# Patient Record
Sex: FEMALE | Race: WHITE | NOT HISPANIC OR LATINO | Employment: FULL TIME | URBAN - METROPOLITAN AREA
[De-identification: names, ages, dates, MRNs, and addresses within clinical notes are randomized per-mention and may not be internally consistent; named-entity substitution may affect disease eponyms.]

---

## 2017-11-17 ENCOUNTER — TRANSFERRED RECORDS (OUTPATIENT)
Dept: HEALTH INFORMATION MANAGEMENT | Facility: CLINIC | Age: 37
End: 2017-11-17

## 2020-04-18 ENCOUNTER — TRANSFERRED RECORDS (OUTPATIENT)
Dept: HEALTH INFORMATION MANAGEMENT | Facility: CLINIC | Age: 40
End: 2020-04-18

## 2020-10-07 ENCOUNTER — TRANSFERRED RECORDS (OUTPATIENT)
Dept: HEALTH INFORMATION MANAGEMENT | Facility: CLINIC | Age: 40
End: 2020-10-07

## 2023-02-10 ENCOUNTER — TRANSFERRED RECORDS (OUTPATIENT)
Dept: HEALTH INFORMATION MANAGEMENT | Facility: CLINIC | Age: 43
End: 2023-02-10

## 2023-02-12 ENCOUNTER — HEALTH MAINTENANCE LETTER (OUTPATIENT)
Age: 43
End: 2023-02-12

## 2023-03-15 ENCOUNTER — OFFICE VISIT (OUTPATIENT)
Dept: URGENT CARE | Facility: URGENT CARE | Age: 43
End: 2023-03-15
Payer: COMMERCIAL

## 2023-03-15 ENCOUNTER — ANCILLARY PROCEDURE (OUTPATIENT)
Dept: GENERAL RADIOLOGY | Facility: CLINIC | Age: 43
End: 2023-03-15
Attending: NURSE PRACTITIONER
Payer: COMMERCIAL

## 2023-03-15 VITALS
TEMPERATURE: 98.8 F | DIASTOLIC BLOOD PRESSURE: 82 MMHG | SYSTOLIC BLOOD PRESSURE: 128 MMHG | HEART RATE: 89 BPM | OXYGEN SATURATION: 98 %

## 2023-03-15 DIAGNOSIS — R05.1 ACUTE COUGH: ICD-10-CM

## 2023-03-15 DIAGNOSIS — R06.2 WHEEZE: ICD-10-CM

## 2023-03-15 DIAGNOSIS — J40 SINOBRONCHITIS: ICD-10-CM

## 2023-03-15 DIAGNOSIS — J32.9 SINOBRONCHITIS: ICD-10-CM

## 2023-03-15 DIAGNOSIS — R05.1 ACUTE COUGH: Primary | ICD-10-CM

## 2023-03-15 PROBLEM — S02.401A: Status: ACTIVE | Noted: 2020-04-22

## 2023-03-15 PROBLEM — S02.2XXA CLOSED FRACTURE OF NASAL BONES: Status: ACTIVE | Noted: 2020-04-22

## 2023-03-15 PROBLEM — M20.001 FINGER DEFORMITY, ACQUIRED, RIGHT: Status: ACTIVE | Noted: 2020-04-22

## 2023-03-15 PROBLEM — F14.10 DRUG ABUSE, COCAINE TYPE (H): Status: ACTIVE | Noted: 2020-04-22

## 2023-03-15 PROBLEM — L65.9 HAIR LOSS: Status: ACTIVE | Noted: 2017-08-14

## 2023-03-15 PROBLEM — R00.9 ABNORMALITY OF HEART BEAT: Status: ACTIVE | Noted: 2019-03-20

## 2023-03-15 PROBLEM — K20.90 ESOPHAGITIS DETERMINED BY ENDOSCOPY: Status: ACTIVE | Noted: 2020-08-28

## 2023-03-15 PROBLEM — K44.9 HIATAL HERNIA: Status: ACTIVE | Noted: 2020-04-22

## 2023-03-15 PROCEDURE — 71046 X-RAY EXAM CHEST 2 VIEWS: CPT | Mod: TC | Performed by: RADIOLOGY

## 2023-03-15 PROCEDURE — 99204 OFFICE O/P NEW MOD 45 MIN: CPT | Performed by: NURSE PRACTITIONER

## 2023-03-15 RX ORDER — ALBUTEROL SULFATE 90 UG/1
2 AEROSOL, METERED RESPIRATORY (INHALATION) EVERY 6 HOURS PRN
Qty: 18 G | Refills: 0 | Status: SHIPPED | OUTPATIENT
Start: 2023-03-15 | End: 2023-03-15

## 2023-03-15 RX ORDER — DOXYCYCLINE 100 MG/1
100 CAPSULE ORAL 2 TIMES DAILY
Qty: 14 CAPSULE | Refills: 0 | Status: SHIPPED | OUTPATIENT
Start: 2023-03-15 | End: 2023-03-22

## 2023-03-15 RX ORDER — DOXYCYCLINE 100 MG/1
100 CAPSULE ORAL 2 TIMES DAILY
Qty: 14 CAPSULE | Refills: 0 | Status: SHIPPED | OUTPATIENT
Start: 2023-03-15 | End: 2023-03-15

## 2023-03-15 RX ORDER — ALBUTEROL SULFATE 90 UG/1
2 AEROSOL, METERED RESPIRATORY (INHALATION) EVERY 6 HOURS PRN
Qty: 18 G | Refills: 0 | Status: SHIPPED | OUTPATIENT
Start: 2023-03-15 | End: 2023-03-27

## 2023-03-15 RX ORDER — OMEPRAZOLE 40 MG/1
40 CAPSULE, DELAYED RELEASE ORAL
COMMUNITY
Start: 2021-06-09 | End: 2024-07-11

## 2023-03-15 RX ORDER — LORAZEPAM 0.5 MG/1
0.5 TABLET ORAL
COMMUNITY
Start: 2021-06-09 | End: 2023-06-14

## 2023-03-15 RX ORDER — SERTRALINE HYDROCHLORIDE 100 MG/1
1 TABLET, FILM COATED ORAL DAILY
COMMUNITY
Start: 2021-06-09 | End: 2023-03-27

## 2023-03-15 NOTE — PATIENT INSTRUCTIONS
Doxy for sinobronchitis  Albuterol for wheeze  Chest x-ray is unremarkable  Rest! Your body needs more rest to heal.  Drink plenty of fluids (warm fluids like tea or soup are soothing and reduce cough)  Sit in the bathroom with a hot shower running and breathe in the steam.  Honey may soothe your sore throat and help manage your cough- may take straight or in warm water with lemon juice.  Avoid smoke (cigarettes, bonfires, fireplace, wood burning stoves).  Take Tylenol or an NSAID such as ibuprofen or naproxen as needed for pain.  Delsym (dextromethorphan polistirex) is an over the counter cough medication that lasts 12 hours.   Mucinex or Robitussin (guiafenesin) thin mucus and may help it to loosen more quickly  Good handwashing is the best way to prevent spread of germs  Present to emergency room if you develop trouble breathing, swallowing or cough-up blood, chest pain.  Follow up with your primary care provider if symptoms worsen or fail to improve as expected.

## 2023-03-15 NOTE — PROGRESS NOTES
Chief Complaint   Patient presents with     Urgent Care     Cough     Having trouble breathing, coughing as well.      SUBJECTIVE:  Huy Coles is a 42 year old female presenting with cough for over a month dry wheezy shortness of breath with sinus pain pressure headaches congestion.  Declines chest pain hemoptysis DVT history calf pain redness swelling recent flights or surgeries.  Took 2 negative COVID test.  No asthma or smoking.  Tried OTCs without response.    No past medical history on file.  LORazepam (ATIVAN) 0.5 MG tablet, Take 0.5 mg by mouth  omeprazole (PRILOSEC) 40 MG DR capsule, Take 40 mg by mouth  sertraline (ZOLOFT) 100 MG tablet, Take 1 tablet by mouth daily    No current facility-administered medications on file prior to visit.    Social History     Tobacco Use     Smoking status: Not on file     Smokeless tobacco: Not on file   Substance Use Topics     Alcohol use: Not on file     Allergies   Allergen Reactions     Azithromycin Hives     Codeine Other (See Comments)     Drowsiness, severe     Tramadol Other (See Comments)     Confusion     Cefaclor Rash     Sulfa Drugs Hives and Rash       Review of Systems   All systems negative except for those listed above in HPI.    OBJECTIVE:   /82   Pulse 89   Temp 98.8  F (37.1  C) (Temporal)   SpO2 98%      Physical Exam  Vitals reviewed.   Constitutional:       Appearance: Normal appearance. She is ill-appearing.   HENT:      Head: Normocephalic and atraumatic.      Right Ear: Tympanic membrane and ear canal normal.      Left Ear: Tympanic membrane and ear canal normal.      Nose: Congestion and rhinorrhea present.   Cardiovascular:      Rate and Rhythm: Normal rate.      Pulses: Normal pulses.   Pulmonary:      Effort: Respiratory distress present.      Breath sounds: No stridor. Wheezing and rhonchi present. No rales.   Chest:      Chest wall: No tenderness.   Musculoskeletal:         General: Normal range of motion.   Skin:     General:  Skin is warm and dry.      Findings: No rash.   Neurological:      General: No focal deficit present.      Mental Status: She is alert and oriented to person, place, and time.   Psychiatric:         Mood and Affect: Mood normal.         Behavior: Behavior normal.       X-ray done in clinic read by me as negative for cardiopulmonary abnormality.    ASSESSMENT:    ICD-10-CM    1. Acute cough  R05.1 XR Chest 2 Views      2. Wheeze  R06.2 albuterol (PROAIR HFA/PROVENTIL HFA/VENTOLIN HFA) 108 (90 Base) MCG/ACT inhaler     DISCONTINUED: albuterol (PROAIR HFA/PROVENTIL HFA/VENTOLIN HFA) 108 (90 Base) MCG/ACT inhaler      3. Sinobronchitis  J32.9 doxycycline hyclate (VIBRAMYCIN) 100 MG capsule    J40 DISCONTINUED: doxycycline hyclate (VIBRAMYCIN) 100 MG capsule        PLAN:    Doxy for sinobronchitis  Albuterol for wheeze  Chest x-ray is unremarkable  Rest! Your body needs more rest to heal.  Drink plenty of fluids (warm fluids like tea or soup are soothing and reduce cough)  Sit in the bathroom with a hot shower running and breathe in the steam.  Honey may soothe your sore throat and help manage your cough- may take straight or in warm water with lemon juice.  Avoid smoke (cigarettes, bonfires, fireplace, wood burning stoves).  Take Tylenol or an NSAID such as ibuprofen or naproxen as needed for pain.  Delsym (dextromethorphan polistirex) is an over the counter cough medication that lasts 12 hours.   Mucinex or Robitussin (guiafenesin) thin mucus and may help it to loosen more quickly  Good handwashing is the best way to prevent spread of germs  Present to emergency room if you develop trouble breathing, swallowing or cough-up blood, chest pain.  Follow up with your primary care provider if symptoms worsen or fail to improve as expected.    Follow up with primary care provider with any problems, questions or concerns or if symptoms worsen or fail to improve. Patient agreed to plan and verbalized understanding.    Lay  CHRYSTAL Price-BC  [facility]

## 2023-03-27 ENCOUNTER — OFFICE VISIT (OUTPATIENT)
Dept: FAMILY MEDICINE | Facility: CLINIC | Age: 43
End: 2023-03-27
Payer: COMMERCIAL

## 2023-03-27 VITALS
WEIGHT: 217.3 LBS | TEMPERATURE: 97.7 F | SYSTOLIC BLOOD PRESSURE: 120 MMHG | HEART RATE: 82 BPM | BODY MASS INDEX: 34.92 KG/M2 | DIASTOLIC BLOOD PRESSURE: 80 MMHG | HEIGHT: 66 IN | RESPIRATION RATE: 20 BRPM | OXYGEN SATURATION: 100 %

## 2023-03-27 DIAGNOSIS — E55.9 VITAMIN D DEFICIENCY: ICD-10-CM

## 2023-03-27 DIAGNOSIS — M20.001 FINGER DEFORMITY, ACQUIRED, RIGHT: ICD-10-CM

## 2023-03-27 DIAGNOSIS — Z13.220 SCREENING FOR HYPERLIPIDEMIA: ICD-10-CM

## 2023-03-27 DIAGNOSIS — K44.9 HIATAL HERNIA: ICD-10-CM

## 2023-03-27 DIAGNOSIS — Z13.1 ENCOUNTER FOR SCREENING FOR DIABETES MELLITUS: ICD-10-CM

## 2023-03-27 DIAGNOSIS — R53.83 FATIGUE, UNSPECIFIED TYPE: ICD-10-CM

## 2023-03-27 DIAGNOSIS — R05.2 SUBACUTE COUGH: ICD-10-CM

## 2023-03-27 DIAGNOSIS — R06.2 WHEEZE: ICD-10-CM

## 2023-03-27 DIAGNOSIS — F41.1 GENERALIZED ANXIETY DISORDER: ICD-10-CM

## 2023-03-27 DIAGNOSIS — F33.0 MILD EPISODE OF RECURRENT MAJOR DEPRESSIVE DISORDER (H): ICD-10-CM

## 2023-03-27 DIAGNOSIS — D50.9 IRON DEFICIENCY ANEMIA, UNSPECIFIED IRON DEFICIENCY ANEMIA TYPE: ICD-10-CM

## 2023-03-27 DIAGNOSIS — Z12.31 ENCOUNTER FOR SCREENING MAMMOGRAM FOR BREAST CANCER: ICD-10-CM

## 2023-03-27 DIAGNOSIS — Z76.89 ENCOUNTER TO ESTABLISH CARE WITH NEW DOCTOR: Primary | ICD-10-CM

## 2023-03-27 DIAGNOSIS — K20.90 ESOPHAGITIS DETERMINED BY ENDOSCOPY: ICD-10-CM

## 2023-03-27 LAB
ALBUMIN SERPL BCG-MCNC: 4.3 G/DL (ref 3.5–5.2)
ALP SERPL-CCNC: 115 U/L (ref 35–104)
ALT SERPL W P-5'-P-CCNC: 10 U/L (ref 10–35)
ANION GAP SERPL CALCULATED.3IONS-SCNC: 13 MMOL/L (ref 7–15)
AST SERPL W P-5'-P-CCNC: 22 U/L (ref 10–35)
BASOPHILS # BLD AUTO: 0 10E3/UL (ref 0–0.2)
BASOPHILS NFR BLD AUTO: 0 %
BILIRUB SERPL-MCNC: 0.4 MG/DL
BUN SERPL-MCNC: 8.8 MG/DL (ref 6–20)
CALCIUM SERPL-MCNC: 9.2 MG/DL (ref 8.6–10)
CHLORIDE SERPL-SCNC: 104 MMOL/L (ref 98–107)
CHOLEST SERPL-MCNC: 204 MG/DL
CREAT SERPL-MCNC: 0.83 MG/DL (ref 0.51–0.95)
DEPRECATED HCO3 PLAS-SCNC: 23 MMOL/L (ref 22–29)
EOSINOPHIL # BLD AUTO: 0.1 10E3/UL (ref 0–0.7)
EOSINOPHIL NFR BLD AUTO: 1 %
ERYTHROCYTE [DISTWIDTH] IN BLOOD BY AUTOMATED COUNT: 17 % (ref 10–15)
FERRITIN SERPL-MCNC: 6 NG/ML (ref 6–175)
GFR SERPL CREATININE-BSD FRML MDRD: 90 ML/MIN/1.73M2
GLUCOSE SERPL-MCNC: 86 MG/DL (ref 70–99)
HBA1C MFR BLD: 5.8 % (ref 0–5.6)
HCT VFR BLD AUTO: 29 % (ref 35–47)
HDLC SERPL-MCNC: 44 MG/DL
HGB BLD-MCNC: 8.1 G/DL (ref 11.7–15.7)
IMM GRANULOCYTES # BLD: 0 10E3/UL
IMM GRANULOCYTES NFR BLD: 0 %
IRON BINDING CAPACITY (ROCHE): 432 UG/DL (ref 240–430)
IRON SATN MFR SERPL: 3 % (ref 15–46)
IRON SERPL-MCNC: 15 UG/DL (ref 37–145)
LDLC SERPL CALC-MCNC: 118 MG/DL
LYMPHOCYTES # BLD AUTO: 3.2 10E3/UL (ref 0.8–5.3)
LYMPHOCYTES NFR BLD AUTO: 31 %
MCH RBC QN AUTO: 19.3 PG (ref 26.5–33)
MCHC RBC AUTO-ENTMCNC: 27.9 G/DL (ref 31.5–36.5)
MCV RBC AUTO: 69 FL (ref 78–100)
MONOCYTES # BLD AUTO: 0.6 10E3/UL (ref 0–1.3)
MONOCYTES NFR BLD AUTO: 5 %
NEUTROPHILS # BLD AUTO: 6.5 10E3/UL (ref 1.6–8.3)
NEUTROPHILS NFR BLD AUTO: 63 %
NONHDLC SERPL-MCNC: 160 MG/DL
PLATELET # BLD AUTO: 554 10E3/UL (ref 150–450)
POTASSIUM SERPL-SCNC: 3.8 MMOL/L (ref 3.4–5.3)
PROT SERPL-MCNC: 7.9 G/DL (ref 6.4–8.3)
RBC # BLD AUTO: 4.2 10E6/UL (ref 3.8–5.2)
SODIUM SERPL-SCNC: 140 MMOL/L (ref 136–145)
TRIGL SERPL-MCNC: 209 MG/DL
TSH SERPL DL<=0.005 MIU/L-ACNC: 1.53 UIU/ML (ref 0.3–4.2)
WBC # BLD AUTO: 10.4 10E3/UL (ref 4–11)

## 2023-03-27 PROCEDURE — 83540 ASSAY OF IRON: CPT | Performed by: NURSE PRACTITIONER

## 2023-03-27 PROCEDURE — 36415 COLL VENOUS BLD VENIPUNCTURE: CPT | Performed by: NURSE PRACTITIONER

## 2023-03-27 PROCEDURE — 80050 GENERAL HEALTH PANEL: CPT | Performed by: NURSE PRACTITIONER

## 2023-03-27 PROCEDURE — 82728 ASSAY OF FERRITIN: CPT | Performed by: NURSE PRACTITIONER

## 2023-03-27 PROCEDURE — 83036 HEMOGLOBIN GLYCOSYLATED A1C: CPT | Performed by: NURSE PRACTITIONER

## 2023-03-27 PROCEDURE — 80061 LIPID PANEL: CPT | Performed by: NURSE PRACTITIONER

## 2023-03-27 PROCEDURE — 82306 VITAMIN D 25 HYDROXY: CPT | Performed by: NURSE PRACTITIONER

## 2023-03-27 PROCEDURE — 83550 IRON BINDING TEST: CPT | Performed by: NURSE PRACTITIONER

## 2023-03-27 PROCEDURE — 96127 BRIEF EMOTIONAL/BEHAV ASSMT: CPT | Performed by: NURSE PRACTITIONER

## 2023-03-27 PROCEDURE — 99215 OFFICE O/P EST HI 40 MIN: CPT | Performed by: NURSE PRACTITIONER

## 2023-03-27 RX ORDER — SERTRALINE HYDROCHLORIDE 100 MG/1
100 TABLET, FILM COATED ORAL DAILY
Qty: 90 TABLET | Refills: 1 | Status: SHIPPED | OUTPATIENT
Start: 2023-03-27 | End: 2024-07-11

## 2023-03-27 RX ORDER — BUSPIRONE HYDROCHLORIDE 5 MG/1
5-10 TABLET ORAL 3 TIMES DAILY
Qty: 180 TABLET | Refills: 1 | Status: SHIPPED | OUTPATIENT
Start: 2023-03-27 | End: 2024-07-11

## 2023-03-27 RX ORDER — FLUTICASONE PROPIONATE 50 MCG
1 SPRAY, SUSPENSION (ML) NASAL DAILY
Qty: 18 ML | Refills: 0 | Status: SHIPPED | OUTPATIENT
Start: 2023-03-27 | End: 2024-07-11

## 2023-03-27 RX ORDER — BUSPIRONE HYDROCHLORIDE 5 MG/1
5-10 TABLET ORAL 3 TIMES DAILY
Qty: 180 TABLET | Refills: 1 | Status: SHIPPED | OUTPATIENT
Start: 2023-03-27 | End: 2023-03-27

## 2023-03-27 RX ORDER — ALBUTEROL SULFATE 90 UG/1
2 AEROSOL, METERED RESPIRATORY (INHALATION) EVERY 6 HOURS PRN
Qty: 18 G | Refills: 0 | Status: ON HOLD | OUTPATIENT
Start: 2023-03-27 | End: 2023-06-20

## 2023-03-27 ASSESSMENT — ANXIETY QUESTIONNAIRES
IF YOU CHECKED OFF ANY PROBLEMS ON THIS QUESTIONNAIRE, HOW DIFFICULT HAVE THESE PROBLEMS MADE IT FOR YOU TO DO YOUR WORK, TAKE CARE OF THINGS AT HOME, OR GET ALONG WITH OTHER PEOPLE: SOMEWHAT DIFFICULT
3. WORRYING TOO MUCH ABOUT DIFFERENT THINGS: SEVERAL DAYS
1. FEELING NERVOUS, ANXIOUS, OR ON EDGE: SEVERAL DAYS
6. BECOMING EASILY ANNOYED OR IRRITABLE: MORE THAN HALF THE DAYS
5. BEING SO RESTLESS THAT IT IS HARD TO SIT STILL: NOT AT ALL
2. NOT BEING ABLE TO STOP OR CONTROL WORRYING: SEVERAL DAYS
7. FEELING AFRAID AS IF SOMETHING AWFUL MIGHT HAPPEN: NOT AT ALL
GAD7 TOTAL SCORE: 6
GAD7 TOTAL SCORE: 6

## 2023-03-27 ASSESSMENT — PAIN SCALES - GENERAL: PAINLEVEL: NO PAIN (0)

## 2023-03-27 ASSESSMENT — PATIENT HEALTH QUESTIONNAIRE - PHQ9
5. POOR APPETITE OR OVEREATING: SEVERAL DAYS
SUM OF ALL RESPONSES TO PHQ QUESTIONS 1-9: 4

## 2023-03-27 NOTE — PROGRESS NOTES
Assessment & Plan     Encounter to establish care with new doctor  Reviewed chronic health conditions; medications, labs and pertinent health concerns today    Wheeze  Subacute cough  - fluticasone (FLONASE) 50 MCG/ACT nasal spray  Dispense: 18 mL; Refill: 0  Renewed inhaler  - albuterol (PROAIR HFA/PROVENTIL HFA/VENTOLIN HFA) 108 (90 Base) MCG/ACT inhaler  Dispense: 18 g; Refill: 0    Encounter for screening for diabetes mellitus  - Hemoglobin A1c  - Hemoglobin A1c    Screening for hyperlipidemia  - Lipid Profile  - Lipid Profile    Generalized anxiety disorder  Mild episode of recurrent major depressive disorder (H)  Stable; continue current regimen; renewed medication  - sertraline (ZOLOFT) 100 MG tablet  Dispense: 90 tablet; Refill: 1  - busPIRone (BUSPAR) 5 MG tablet  Dispense: 180 tablet; Refill: 1      Fatigue, unspecified type  Check iron studies d/t uncontrolled gerd, tsh,, vitamin d  - CBC with Platelets & Differential  - Comprehensive metabolic panel  - Ferritin  - Iron & Iron Binding Capacity  - TSH with free T4 reflex  - CBC with Platelets & Differential  - Comprehensive metabolic panel  - Ferritin  - Iron & Iron Binding Capacity  - TSH with free T4 reflex    Encounter for screening mammogram for breast cancer  - MA Screening Digital Bilateral      Vitamin D deficiency  15; over the counter Vitamin D3  2000 IU - 3 tabs (6000 IU) daily for 6 weeks and then 2000 IU daily to maintain levels.    - Vitamin D Deficiency  - Vitamin D Deficiency  - **Vitamin D Deficiency FUTURE 2mo    Esophagitis determined by endoscopy  Hiatal hernia  Managed by Gi; failed multiple PPI therapy; last EGD 10/2020; will refer to GI for continued management   - Adult GI  Referral - Consult Only      Finger deformity, acquired, right  S/p Fracture of middle phalanx of right ring finger    PA FIX FINGER,VOLAR PLATE,I-P JT Right 1/9/2020   Procedure: RIGHT RING FINGER PROXIMAL INTERPHALANGEAL VOLAR PLATE ARTHROPLASTY WITH  PINNING;  - Occupational Therapy Referral    Iron deficiency anemia, unspecified iron deficiency anemia type  - CBC with platelets FUTURE 2mo  - Ferritin FUTURE 2mo  - Iron and iron binding capacity FUTURE 2mo  - ferrous sulfate (FEROSUL) 325 (65 Fe) MG tablet  Dispense: 36 tablet; Refill: 0      Fracture of middle phalanx of right ring finger   [place] FIX FINGER,VOLAR PLATE,I-P JT Right 1/9/2020   Procedure: RIGHT RING FINGER PROXIMAL INTERPHALANGEAL VOLAR PLATE ARTHROPLASTY WITH PINNING;    44 minutes of the appointment were spent evaluating and developing a treatment plan for her additional concern(s).      MATILDA Davis CNP  [facility]    Lola Son is a 42 year old, presenting for the following health issues:  Establish Care and Medication Request    Additional Questions 3/27/2023   Roomed by Marleen Moore   Accompanied by alone     Patient Reported Additional Medications 3/27/2023   Patient reports taking the following new medications none     42 year old year old female  with PMH   Patient Active Problem List   Diagnosis Code     Abnormality of heart beat R00.9     Anxiety disorder F41.9     Closed fracture of nasal bones S02.2XXA     Drug abuse, cocaine type (H) F14.10     Fatigue R53.83     Finger deformity, acquired, right M20.001     Esophagitis determined by endoscopy K20.90     Hair loss L65.9     Hiatal hernia K44.9     Major depressive disorder F32.9     Maxillary fracture, unspecified side, initial encounter for closed fracture (H) S02.401A     Vitamin D deficiency E55.9       - GERD/H. [place]; EGD 2020; currently on omeprazole 40 mg BID; recommended famotidine 20 mg trial;  and ereferral placed by [facility] to GI not completed;     - Depression/Anxiety: zoloft 100mg daily; taking ativan prn       3/27/2023     1:50 PM   RENATA-7 SCORE   Total Score 6         3/27/2023     1:48 PM   PHQ   PHQ-9 Total Score 4   Q9: Thoughts of better  "off dead/self-harm past 2 weeks Not at all      - cough: using albuterol prn    - Pap: completed 2019; cytology and hpv wnl  - mammo: due  - right hand pain: s/p   NV FIX FINGER,VOLAR PLATE,I-P JT    NV RELEASE I-P JT CONTRACTURE    RIGHT RING FINGER PROXIMAL INTERPHALANGEAL VOLAR PLATE ARTHROPLASTY VERSUS ARTHRODESIS          - vitamin d def: follow     History of Present Illness       Reason for visit:  Establish care / get meds    She eats 0-1 servings of fruits and vegetables daily.She consumes 5 sweetened beverage(s) daily.She exercises with enough effort to increase her heart rate 9 or less minutes per day.  She exercises with enough effort to increase her heart rate 3 or less days per week. She is missing 7 dose(s) of medications per week.       Medication Followup of zoloft    Taking Medication as prescribed: yes    Side Effects:  None    Medication Helping Symptoms:  yes          Review of Systems         Objective    /80 (BP Location: Right arm, Patient Position: Sitting, Cuff Size: Adult Regular)   Pulse 82   Temp 97.7  F (36.5  C) (Temporal)   Resp 20   Ht 1.676 m (5' 6\")   Wt 98.6 kg (217 lb 4.8 oz)   SpO2 100%   BMI 35.07 kg/m    Body mass index is 35.07 kg/m .  Physical Exam   GENERAL: healthy, alert and no distress  NECK: no adenopathy, no asymmetry, masses, or scars and thyroid normal to palpation  RESP: lungs clear to auscultation - no rales, rhonchi or wheezes  CV: regular rate and rhythm, normal S1 S2, no S3 or S4, no murmur, click or rub, no peripheral edema and peripheral pulses strong  ABDOMEN: soft, nontender, no hepatosplenomegaly, no masses and bowel sounds normal  MS: no gross musculoskeletal defects noted, no edema                    "

## 2023-03-28 ENCOUNTER — MYC MEDICAL ADVICE (OUTPATIENT)
Dept: FAMILY MEDICINE | Facility: CLINIC | Age: 43
End: 2023-03-28
Payer: COMMERCIAL

## 2023-03-28 LAB — DEPRECATED CALCIDIOL+CALCIFEROL SERPL-MC: 15 UG/L (ref 20–75)

## 2023-03-29 ENCOUNTER — TELEPHONE (OUTPATIENT)
Dept: GASTROENTEROLOGY | Facility: CLINIC | Age: 43
End: 2023-03-29
Payer: COMMERCIAL

## 2023-03-29 NOTE — TELEPHONE ENCOUNTER
Health Call Center    Phone Message    May a detailed message be left on voicemail: yes     Reason for Call: Appointment Intake    Referring Provider Name: Eduardo Keith  Diagnosis and/or Symptoms: Esophagitis determined by endoscopy     Per triage notes, patient is to be seen as a Urgent, but is currently scheduled on 5/4/23 with Jordan Hodges. Current appointment is outside requested time frame. Please review for further follow up per scheduling guidelines. Thanks!     Declined PH     Action Taken: Message routed to:  Other: GI triage     Travel Screening: Not Applicable

## 2023-03-30 ENCOUNTER — HOSPITAL ENCOUNTER (OUTPATIENT)
Dept: MAMMOGRAPHY | Facility: CLINIC | Age: 43
Discharge: HOME | End: 2023-03-30
Attending: NURSE PRACTITIONER | Admitting: NURSE PRACTITIONER
Payer: COMMERCIAL

## 2023-03-30 DIAGNOSIS — Z12.31 ENCOUNTER FOR SCREENING MAMMOGRAM FOR BREAST CANCER: ICD-10-CM

## 2023-03-30 PROCEDURE — 77067 SCR MAMMO BI INCL CAD: CPT

## 2023-03-30 NOTE — TELEPHONE ENCOUNTER
REFERRAL INFORMATION:    Referring Provider:  MATILDA Davis CNP    Referring Clinic:  [facility]    Reason for Visit/Diagnosis: Esophagitis     FUTURE VISIT INFORMATION:    Appointment Date: 04-24-23    Appointment Time: @ 10am      NOTES STATUS DETAILS   OFFICE NOTE from Referring Provider Internal  03-27-23 MATILDA Davis CNP   OFFICE NOTE from Other Specialist Care everywhere [facility] [place]:  04-22-20, 05-06-20 Dr. Boni Guillen  01-07-20 PRISCILLA Jules  08-28-20: Sarah Anaya DO([facility] POS)   HOSPITAL DISCHARGE SUMMARY/  ED VISITS N/A    OPERATIVE REPORT N/A    MEDICATION LIST Internal         ENDOSCOPY  Care everywhere/In process 11-17-17([facility])    COLONOSCOPY N/A    ERCP N/A    EUS N/A    STOOL TESTING N/A    PERTINENT LABS Internal/Care everywhere C-diff: 03-27-23, 04-18-20   PATHOLOGY REPORTS (RELATED) N/A    IMAGING (CT, MRI, EGD, MRCP, Small Bowel Follow Through/SBT, MR/CT Enterography) Internal/Care everywhere - in process XR chest: 03-15-23(PACS)  XR chest: 04-18-20, 09-20-16 ([facility])      03-30-23 sent req for recs/images to [facility] and [facility] @ 2:12pm  03-30-23 [facility] tracking # [id number]

## 2023-03-30 NOTE — TELEPHONE ENCOUNTER
Pt is now scheduled to see Katalina Shahid on 4/3/2023 at 7:30am for a video visit.     Per Pt, Pt has recs with [facility] in [place]. Writer will send message to clinic  to have recs requested.

## 2023-03-30 NOTE — TELEPHONE ENCOUNTER
Called and left voice message for Pt. Called Pt to help rescheduled their 4/3/2023 appointment with Katalina Shahid to see Dr. Almodovar on  4/24/2023. Writer left call back number.    Writer offered Pt an appointment time on 4/24/2023 at 10am for a video visit with Dr. Almodovar. Writer also informed Pt that the appointment on 4/3/2023 with Katalina will be canceled.

## 2023-03-30 NOTE — TELEPHONE ENCOUNTER
"REFERRAL INFORMATION:    Referring Provider:  Eduardo Keith APRN CNP    Referring Clinic:  [facility]     Reason for Visit/Diagnosis: Esophagitis determined by endoscopy                  long history uncontrolled GERD, H. hernia; on multiple therapies of PPI ; last EGD 2020     FUTURE VISIT INFORMATION:    Appointment Date: 04-     NOTES STATUS DETAILS   OFFICE NOTE from Referring Provider Internal 3/27/2023 OV with CHANEL Keith   OFFICE NOTE from Other Specialist Care Everywhere [facility]:\"   8/28/2020 OV with MANN Anaya   4/22/2020 OV eith [facility] HANS Guillen   HOSPITAL DISCHARGE SUMMARY/  ED VISITS N/A    OPERATIVE REPORT N/A    MEDICATION LIST Internal         ENDOSCOPY  N/A    COLONOSCOPY N/A    ERCP N/A    EUS N/A    STOOL TESTING N/A    PERTINENT LABS N/A    PATHOLOGY REPORTS (RELATED) N/A    IMAGING (CT, MRI, EGD, MRCP, Small Bowel Follow Through/SBT, MR/CT Enterography) Internal XR chest: 03-15-23   Request sent to [facility] for records fac [phone]      "

## 2023-03-31 NOTE — TELEPHONE ENCOUNTER
Pt called back to confirm that 4/24/2023 at 10am for a video visit with Dr. Almodovar will work as well. Writer will have Pt rescheduled accordingly.

## 2023-04-03 ENCOUNTER — PRE VISIT (OUTPATIENT)
Dept: GASTROENTEROLOGY | Facility: CLINIC | Age: 43
End: 2023-04-03

## 2023-04-03 RX ORDER — FERROUS SULFATE 325(65) MG
325 TABLET ORAL
Qty: 36 TABLET | Refills: 0 | Status: SHIPPED | OUTPATIENT
Start: 2023-04-03 | End: 2023-07-02

## 2023-04-20 ENCOUNTER — THERAPY VISIT (OUTPATIENT)
Dept: OCCUPATIONAL THERAPY | Facility: CLINIC | Age: 43
End: 2023-04-20
Attending: NURSE PRACTITIONER
Payer: COMMERCIAL

## 2023-04-20 DIAGNOSIS — M79.644 PAIN OF FINGER OF RIGHT HAND: ICD-10-CM

## 2023-04-20 DIAGNOSIS — M20.001 FINGER DEFORMITY, ACQUIRED, RIGHT: ICD-10-CM

## 2023-04-20 DIAGNOSIS — Z47.89 ORTHOPEDIC AFTERCARE: ICD-10-CM

## 2023-04-20 PROCEDURE — 97760 ORTHOTIC MGMT&TRAING 1ST ENC: CPT | Mod: GO | Performed by: OCCUPATIONAL THERAPIST

## 2023-04-20 PROCEDURE — 97165 OT EVAL LOW COMPLEX 30 MIN: CPT | Mod: GO | Performed by: OCCUPATIONAL THERAPIST

## 2023-04-20 NOTE — PROGRESS NOTES
[facility] Initial Evaluation     Current Date: 4/20/2023    Diagnosis: R RF P2 Fx s/p ORIF  DOS/Procedures:  - Initial ORIF on 12/19/19 with Dr. Roverto Mcneal  - Proximal phalangeal volar plate arthroplasty with pinning on  1/9/2020  Post: Approximately 3 years post-operative    Subjective:  Patient Health History  Huy Coles being seen for Evaluation for likely PT for finger.     Problem began: 1/10/2020.   Problem occurred: Surgery on finger that was broke since 11/19   Pain is reported as 0/10 on pain scale.  General health as reported by patient is good.  Pertinent medical history includes: anemia, depression, history of fractures and overweight.     Medical allergies: none.   Surgeries include:  Orthopedic surgery and other. Other surgery history details: Gallbladder removed.    Current medications:  Anti-depressants and other. Other medications details: Gerd medication and vitamin supplements.       Primary job tasks include:  Computer work, driving and prolonged sitting.                  Occupational Profile Information:  Right hand dominant  Prior functional level:  no limitations  Patient reports symptoms of pain, stiffness/loss of motion and weakness/loss of strength  Special tests:  x-ray.    Previous treatment: HEP, coban wrapping, ice  Barriers include:none  Mobility: No difficulty  Transportation: drives  Currently working in normal job without restrictions  Leisure activities/hobbies: Online activity, computer  Other: Has Dragon software at work, works as a . Continues to utilize Coban at night.     Functional Outcome Measure:   Upper Extremity Functional Index Score:  SCORE:   Column Totals: /80: (P) 72   (A lower score indicates greater disability.)    Objective:  Pain Level (Scale 0-10)   4/20/2023   At Rest 0/10   With Use 4/10     Pain Description  Date 4/20/2023   Location Radial, ulnar R RF PIP   Pain Quality Aching and Tiring   Frequency intermittent     Pain is worst   daytime   Exacerbated by  Typing   Relieved by rest   Progression Relatively unchanged     Sensation   WNL throughout all nerve distributions; per patient report    ROM  Ring Finger 4/20/2023 4/20/2023   AROM (PROM) L R   MCP 0/82 -7/87 (0/87)   PIP 0/95 -7/67 (0/90)   DIP 0/68 -5/46 (0/48)   ALONSO       Strength   (Measured in pounds)  Pain Report: - none  + mild    ++ moderate    +++ severe    4/20/2023 4/20/2023   Trials L R   1  2  3 35 30   Average 35 30     Assessment:  Patient presents with symptoms consistent with diagnosis of right ring finger pain, with surgical  intervention.     Patient's limitations or Problem List includes:  Pain, Decreased ROM/motion, Increased edema, Weakness, Decreased stability, Hypermobility, Hypomobility, Decreased , Decreased coordination, Decreased dexterity and Tightness in musculature of the right hand and ring finger which interferes with the patient's ability to perform Self Care Tasks (dressing), Work Tasks, Sleep Patterns, Recreational Activities, Household Chores and Driving  as compared to previous level of function.    Rehab Potential:  Good - Return to full activity, some limitations    Patient will benefit from skilled Occupational Therapy to increase ROM, flexibility, motion,  strength, pinch strength, coordination and dexterity and decrease pain and edema to return to previous activity level and resume normal daily tasks and to reach their rehab potential.    Barriers to Learning:  No barrier    Communication Issues:  Patient appears to be able to clearly communicate and understand verbal and written communication and follow directions correctly.    Chart Review: Chart Review, Brief history including review of medical and/or therapy records relating to the presenting problem and Detailed history review with patient    Identified Performance Deficits: dressing, communication management, driving and community mobility, health management and maintenance,  home establishment and management, meal preparation and cleanup, shopping, sleep, work and leisure activities    Assessment of Occupational Performance:  1-3 Performance Deficits    Clinical Decision Making (Complexity): Low complexity    Treatment Explanation:  The following has been discussed with the patient:  RX ordered/plan of care  Anticipated outcomes  Possible risks and side effects    Plan:  Frequency:  1 X week, once daily  Duration:  for 8 weeks    Treatment Plan:    Therapeutic Exercise:    AROM, AAROM, PROM, Tendon Gliding, Blocking, Reverse Blocking, Isotonics, Isometrics and Stabilization  Therapeutic Activities:   Functional activities  Joint protection, activity modification, conservative methods of pain control   Neuromuscular re-ed:   Nerve Gliding, Coordination/Dexterity, Proprioceptive Training, Posture, Kinesiotaping, Isometrics and Stabilization  Manual Techniques:   Coordination/Dexterity, Joint mobilization, Scar mobilization, Friction massage, Myofascial release and Manual edema mobilization  Orthotic Fabrication:    Static, Finger based and Hand based  Self Care:    Self Care Tasks, Ergonomic Considerations and Work Tasks    Discharge Plan:  Achieve all LTG.  Independent in home treatment program.  Reach maximal therapeutic benefit.    Home Exercise Program  RMO placing R RF in relative extension to promote FDS recruitment  Updated HEP at next appointment pending tolerance    Next Visit  RMO adjustments PRN  Update HEP as indicated  Anticipate fabrication of custom oval-9 for R RF PIP lateral support  Retrograde massage, joint mobilization as indicated

## 2023-04-24 ENCOUNTER — VIRTUAL VISIT (OUTPATIENT)
Dept: GASTROENTEROLOGY | Facility: CLINIC | Age: 43
End: 2023-04-24
Attending: NURSE PRACTITIONER
Payer: COMMERCIAL

## 2023-04-24 ENCOUNTER — TELEPHONE (OUTPATIENT)
Dept: GASTROENTEROLOGY | Facility: CLINIC | Age: 43
End: 2023-04-24

## 2023-04-24 VITALS — BODY MASS INDEX: 35.51 KG/M2 | WEIGHT: 220 LBS

## 2023-04-24 DIAGNOSIS — D50.0 IRON DEFICIENCY ANEMIA DUE TO CHRONIC BLOOD LOSS: Primary | ICD-10-CM

## 2023-04-24 DIAGNOSIS — K44.9 HIATAL HERNIA: ICD-10-CM

## 2023-04-24 DIAGNOSIS — K20.90 ESOPHAGITIS DETERMINED BY ENDOSCOPY: ICD-10-CM

## 2023-04-24 DIAGNOSIS — K21.00 GASTROESOPHAGEAL REFLUX DISEASE WITH ESOPHAGITIS WITHOUT HEMORRHAGE: ICD-10-CM

## 2023-04-24 PROCEDURE — 99204 OFFICE O/P NEW MOD 45 MIN: CPT | Mod: VID | Performed by: INTERNAL MEDICINE

## 2023-04-24 ASSESSMENT — PAIN SCALES - GENERAL: PAINLEVEL: NO PAIN (0)

## 2023-04-24 NOTE — PROGRESS NOTES
"Huy Coles is a 42 year old female who is being evaluated via a billable video visit.      Virtual Visit Details    Type of service:  Video Visit     Originating Location (pt. Location): Home  Distant Location (provider location):  Off-site  Platform used for Video Visit: StoryPress   Start time 10:00 AM  End time 10:13 AM    Gastroenterology Visit for: Huy Coles 1980   MRN: [id number]  April 24, 2023      Huy Coles is a 42 year old female who is being evaluated via a billable video (or phone) visit.      The patient has been notified of following:     \"This video (or phone) visit will be conducted via a call between you and your physician/provider. We have found that certain health care needs can be provided without the need for an in-person physical exam.  This service lets us provide the care you need with a video conversation.  If a prescription is necessary we can send it directly to your pharmacy.  If lab work is needed we can place an order for that and you can then stop by our lab to have the test done at a later time.    If during the course of the call the physician/provider feels a video/phone visit is not appropriate, you will not be charged for this service.\"     Patient confirmed that they are in [place] for today's visit      Reason for Visit:  chief complaint GERD    Referred by: Sagar  / Karen RHODES [facility] [address] / [place] [address]  Patient Care Team:  Eduardo Keith APRN CNP as PCP - General (Family Medicine)  Eduardo Keith APRN CNP as Nurse Practitioner (Family Medicine)  Jordan Hodges PA-C as Physician Assistant (Gastroenterology)    History of Present Illness:   Huy Coles is a 42 year old female.    This is a very pleasant 42-year-old female with past medical history of uncontrolled acid reflux symptoms who is referred to this clinic for further evaluation and management.  Patient reports having had upper endoscopy several years ago where she was told she has hiatal " hernia and she was noted to have mild esophagitis.  Currently she is taking omeprazole twice a day with frequent Tums and as needed Pepcid with symptoms occurring 2 to 3 days of a week.  She reports that during 5 days of the week when symptoms are controlled, they are at the best 80% controlled.  Overall she is doing poorly with symptoms including heartburn, regurgitation, vomiting, reflux, cough and chest comfort.  In the past she was told that she could get surgery however she did not get recommendation from her gastroenterologist.  She has tried multiple different proton pump inhibitor therapies including Prevacid and dexlansoprazole but she had insurance issues and cost constraint.  Therefore she had to switch back to omeprazole that she is taking currently.  She denies any family history of esophageal cancer.  She denies smoking.    She denies any melena, hematochezia, fresh blood in stool or abdominal pain.  She denies any significant dysphagia or odynophagia or weight loss.  She denies taking any NSAIDs.      Wt Readings from Last 5 Encounters:   04/24/23 99.8 kg (220 lb)   03/27/23 98.6 kg (217 lb 4.8 oz)        Esophageal Questionnaire(s)    BEDQ Questionnaire      4/17/2023    10:49 PM   BEDQ Questionnaire: How Often Have You Had the Following?   Trouble eating solid food (meat, bread, vegetables) 0   Trouble eating soft foods (yogurt, jello, pudding) 0   Trouble swallowing liquids 0   Pain while swallowing 0   Coughing or choking while swallowing foods or liquids 0   Total Score: 0         4/17/2023    10:49 PM   BEDQ Questionnaire: Discomfort/Pain Ratings   Eating solid food (meat, bread, vegetables) 0   Eating soft foods (yogurt, jello, pudding) 0   Drinking liquid 0   Total Score: 0       Eckardt Questionnaire      4/17/2023    10:51 PM   Eckardt Questionnaire   Dysphagia 0   Regurgitation 0   Retrosternal Pain 0   Weight Loss (kg) 0   Total Score:  0       Promis 10 Questionnaire      4/17/2023     10:53 PM   PROMIS 10 FLOWSHEET DATA   In general, would you say your health is: 4   In general, would you say your quality of life is: 4   In general, how would you rate your physical health? 3   In general, how would you rate your mental health, including your mood and your ability to think? 5   In general, how would you rate your satisfaction with your social activities and relationships? 5   In general, please rate how well you carry out your usual social activities and roles. (This includes activities at home, at work and in your community, and responsibilities as a parent, child, spouse, employee, friend, etc.) 5   To what extent are you able to carry out your everyday physical activities such as walking, climbing stairs, carrying groceries, or moving a chair? 4   In the past 7 days, how often have you been bothered by emotional problems such as feeling anxious, depressed, or irritable? 3   In the past 7 days, how would you rate your fatigue on average? 4   In the past 7 days, how would you rate your pain on average, where 0 means no pain, and 10 means worst imaginable pain? 0   Mental health question re-calculation - no clinical value 3   Physical health question re-calculation - no clinical value 2   Pain question re-calculation - no clinical value 5   Global Mental Health Score 17   Global Physical Health Score 14   PROMIS TOTAL - SUBSCORES 31           STUDIES & PROCEDURES:    No results found for: EXAMENDO, COLONOSCOPY         Prior medical records were reviewed including, but not limited to, notes from referring providers, lab work, radiographic tests, and other diagnostic tests. Pertinent results were summarized above.     History   No past medical history on file.    No past surgical history on file.    Social History     Socioeconomic History     Marital status:      Spouse name: Not on file     Number of children: Not on file     Years of education: Not on file     Highest education level: Not  on file   Occupational History     Not on file   Tobacco Use     Smoking status: Never     Smokeless tobacco: Never   Vaping Use     Vaping status: Not on file   Substance and Sexual Activity     Alcohol use: Not on file     Drug use: Not on file     Sexual activity: Not on file   Other Topics Concern     Not on file   Social History Narrative     Not on file     Social Determinants of Health     Financial Resource Strain: Not on file   Food Insecurity: Not on file   Transportation Needs: Not on file   Physical Activity: Not on file   Stress: Not on file   Social Connections: Not on file   Intimate Partner Violence: Not on file   Housing Stability: Not on file       No family history on file.  Family history reviewed and edited as appropriate    Medications and Allergies:     Outpatient Encounter Medications as of 4/24/2023   Medication Sig Dispense Refill     albuterol (PROAIR HFA/PROVENTIL HFA/VENTOLIN HFA) 108 (90 Base) MCG/ACT inhaler Inhale 2 puffs into the lungs every 6 hours as needed for shortness of breath, wheezing or cough 18 g 0     busPIRone (BUSPAR) 5 MG tablet Take 1-2 tablets (5-10 mg) by mouth 3 times daily 180 tablet 1     ferrous sulfate (FEROSUL) 325 (65 Fe) MG tablet Take 1 tablet (325 mg) by mouth Every Mon, Wed, Fri Morning for 90 days 36 tablet 0     fluticasone (FLONASE) 50 MCG/ACT nasal spray Spray 1 spray into both nostrils daily 18 mL 0     LORazepam (ATIVAN) 0.5 MG tablet Take 0.5 mg by mouth       omeprazole (PRILOSEC) 40 MG DR capsule Take 40 mg by mouth       sertraline (ZOLOFT) 100 MG tablet Take 1 tablet (100 mg) by mouth daily 90 tablet 1     No facility-administered encounter medications on file as of 4/24/2023.        Allergies   Allergen Reactions     Azithromycin Hives     Codeine Other (See Comments)     Drowsiness, severe     Tramadol Other (See Comments)     Confusion     Cefaclor Rash     Sulfa Drugs Hives and Rash        Review of systems:  A full 10 point review of  systems was obtained and was negative except for the pertinent positives and negatives stated within the HPI.    Objective Findings:   Physical Exam:  Video visit  Constitutional: Wt 99.8 kg (220 lb)   BMI 35.51 kg/m    General: Alert, cooperative, no distress, well-appearing     Labs, Radiology, Pathology     Lab Results   Component Value Date    WBC 10.4 03/27/2023    HGB 8.1 (L) 03/27/2023     (H) 03/27/2023    CHOL 204 (H) 03/27/2023    TRIG 209 (H) 03/27/2023    HDL 44 (L) 03/27/2023    ALT 10 03/27/2023    AST 22 03/27/2023     03/27/2023    BUN 8.8 03/27/2023    CO2 23 03/27/2023    TSH 1.53 03/27/2023        Liver Function Studies -   Recent Labs   Lab Test 03/27/23  1521   PROTTOTAL 7.9   ALBUMIN 4.3   BILITOTAL 0.4   ALKPHOS 115*   AST 22   ALT 10          Patient Active Problem List    Diagnosis Date Noted     Pain of finger of right hand 04/20/2023     Priority: Medium     Orthopedic aftercare 04/20/2023     Priority: Medium     Esophagitis determined by endoscopy 08/28/2020     Priority: Medium     Closed fracture of nasal bones 04/22/2020     Priority: Medium     Last Assessment & Plan:   Formatting of this note might be different from the original.  Overall the patient appears to be healing quite nicely.  She no longer has her splint on.  I told her she needs to be careful as up to 6 weeks need to past before her nasal bones are likely to stay in place completely with any significant trauma.  I did explain that any fracture is more likely to refracture compared with native bone.    From my perspective she can return to relatively normal activity.  She should continue sinus precautions for up to 6 weeks.  This includes not blowing her nose and not bending pushing straining or sneezing if at all possible.    With respect to her nasal fractures, I do not need to see her unless she has other issues or concerns.  He is pleased with her overall appearance and her eyesight.  She should call  return with other issues or concerns.       Drug abuse, cocaine type (H) 04/22/2020     Priority: Medium     Last Assessment & Plan:   Formatting of this note might be different from the original.  The patient has a history of documented cocaine abuse.  She had a positive tox screen when she came to the ED on Saturday.  It is unclear how she obtained the injuries that she had but it is apparent that she had been under the influence at the time of which she sustained the injuries.    I did  her briefly on difficulties with healing related to cocaine use and recommended against future use.       Finger deformity, acquired, right 04/22/2020     Priority: Medium     Last Assessment & Plan:   Formatting of this note might be different from the original.  The patient has a residual deformity of the digit as previously described.  She had this treated by another hand surgeon and is wondering if something could be done.    I explained that due to the finding intercurrent therapy needs, I would like to wait several months before doing anything for this.  She may be a candidate for a derotational osteotomy or a closing wedge osteotomy in order to better occlude a fist as she is dropping change through the gap that is created by her canted ring finger.  I explained that ideally the original surgeon should treat this, however she stated that she is not interested in returning to the original surgeon.    For this reason I stated that we could address this at a later date.  Due to her history of substance abuse I would want a negative screen prior to any surgical intervention.  We can readdress this in 4 to 6 months when she has completed her therapy to assess whether or not she would benefit from additional intervention.  In the meantime I would like her to get the IP joints as supple as possible to see if this can help occlude this area.       Hiatal hernia 04/22/2020     Priority: Medium     Last Assessment & Plan:    Formatting of this note might be different from the original.  The patient has significant and continued problems with reflux.  She has a history of a hiatal hernia which has previously been diagnosed.  She did ask if she could have a referral to someone to help treat this.  She previously saw 1 surgeon but has been since fired from that practice according to her.    I explained that 1 of my partners does treat hiatal hernia is usually with minimally invasive methods.  I did explain that some of her illicit substance use may create issues with that but I am happy to refer her on for a consultation at the very least.  I also explained that this may be viewed as a nonessential surgery and as a result she may have to wait to have this done.  She voiced understanding but would like the referral today.  I will provide this to Dr. Kyler Rockwell with general surgery in my office       Maxillary fracture, unspecified side, initial encounter for closed fracture (H) 04/22/2020     Priority: Medium     Last Assessment & Plan:   Formatting of this note might be different from the original.  The patient has bilateral nondisplaced maxillary fractures which communicate with the sinus.  She should continue her prophylactic antibiotics for her maxillary sinus fractures.  She should also perform sinus precautions including not blowing her nose, no bending pushing straining or pulling, and if she has to sneeze to keep her mouth open.    I explained that these are likely not going to displace and her periorbital ecchymosis will go away in about 2 weeks.  She has no ocular complaints that she has no change in projection or the appearance of her midface.    I do not anticipate she will need any surgical intervention for any of these issues.  She should call or return with other issues or concerns but otherwise I will plan to see her in 2 weeks and she should maintain her current restrictions.       Abnormality of heart beat 03/20/2019      Priority: Medium     Hair loss 08/14/2017     Priority: Medium     Last Assessment & Plan:   Formatting of this note might be different from the original.  TSH ordered       Fatigue 01/28/2016     Priority: Medium     Last Assessment & Plan:   Formatting of this note might be different from the original.  TSH ordered       Vitamin D deficiency 01/28/2016     Priority: Medium     Anxiety disorder 08/17/2012     Priority: Medium     Last Assessment & Plan:   Formatting of this note might be different from the original.  Stable, continue current medications.       Major depressive disorder 08/17/2012     Priority: Medium     Last Assessment & Plan:   Formatting of this note might be different from the original.  Stable, continue current medications.        Assessment and Plan   Assessment:    Huy Coles is 42 year old female.    1. Iron deficiency anemia due to chronic blood loss    2. Esophagitis determined by endoscopy    3. Hiatal hernia    4. Gastroesophageal reflux disease with esophagitis without hemorrhage       Patient with longstanding history of uncontrolled acid reflux disease requiring multiple medications.  She is currently taking omeprazole twice a day, several Tums and Pepcid as needed with less than 80% control of symptoms.  She has history of large hiatal hernia.  We discussed further evaluation with upper endoscopy due to uncontrolled symptoms and iron deficiency anemia as well as colonoscopy to rule out any underlying neoplasm, inflammation, ulcers versus other causes.  We also discussed obtaining surgical evaluation due to large hiatal hernia and persistent symptoms that are partially responsive to PPIs.  She is also afraid of long-term side effects from medications and due to insurance issues she is not able to take other agents including  Dexlansoprazole or rabeprazole.    Plan:  Plan for upper endoscopy and colonoscopy for evaluation of uncontrolled GERD and iron deficiency anemia with  monitored anesthesia care.        Follow up plan:   Return to clinic in 6 months or PRN    The risks and benefits of my recommendations, as well as other treatment options were discussed with the patient and any available family today. All questions were answered.     o Follow up: As planned above. Today, I personally spent 14 minutes in direct virtual care (video visit) face to face time with the patient, of which greater than 50% of the time was spent in patient education and counseling as described above. Approximately 40 minutes were spent on indirect care associated with the patient's consultation including but not limited to review of: patient medical records to date, clinic visits, hospital records, lab results, imaging studies, procedural documentation, and coordinating care with other providers. The findings from this review are summarized in the above note. All of the above accounted for a cumulative time of 54 minutes and was performed on the date of service.     The patient verbalized understanding of the plan and was appreciative for the time spent and information provided during the virtual visit.       Danny Almodovar MD, MPH   of Medicine  Division of Gastroenterology, Hepatology, and Nutrition  [facility]     ----------------------------------------------------------------------------------------------------------------  Please note that the documentation was assisted by voice recognition software and documentation system. Manual proofreading was performed before finalization, however, voice recognition system related typos could have occurred and manually corrected when detected.

## 2023-04-24 NOTE — TELEPHONE ENCOUNTER
Screening Questions  BLUE  KIND OF PREP RED  LOCATION [review exclusion criteria] GREEN  SEDATION TYPE        YES Are you active on mychart?       Danny Almodovar V,  Ordering/Referring Provider?        MEDICA What type of coverage do you have?      N Have you had a positive covid test in the last 14 days?     35.51 1. BMI  [BMI 40+ - review exclusion criteria& smart-phrase document]    Y  2. Are you able to give consent for your medical care? [IF NO,RN REVIEW]          N  3. Are you taking any prescription pain medications on a routine schedule   (ex narcotics: oxycodone, roxicodone, oxycontin,  and percocet)? [RN Review]        NA  3a. EXTENDED PREP What kind of prescription?     N 4. Do you have any chemical dependencies such as alcohol, street drugs, or methadone?        **If yes 3- 5 , please schedule with MAC sedation.**          IF YES TO ANY 6 - 10 - HOSPITAL SETTING ONLY.     N 6.   Do you need assistance transferring?     N 7.   Have you had a heart or lung transplant?    N 8.   Are you currently on dialysis?   N 9.   Do you use daily home oxygen?   N 10. Do you take nitroglycerin?   10a. NA If yes, how often?     11. [FEMALES]  N Are you currently pregnant?    11a. NA If yes, how many weeks? [ Greater than 12 weeks, OR NEEDED]    N 12. Do you have Pulmonary Hypertension? *NEED PAC APPT AT [facility] w/ MAC*     N 13. [review exclusion criteria]  Do you have any implantable devices in your body (pacemaker, defib, LVAD)?    N 14. In the past 6 months, have you had any heart related issues including cardiomyopathy or heart attack?     14a. NA If yes, did it require cardiac stenting if so when?     N 15. Have you had a stroke or Transient ischemic attack (TIA - aka  mini stroke ) within 6 months?      N 16. Do you have mod to severe Obstructive Sleep Apnea?  [Hospital only]    N 17. Do you have SEVERE AND UNCONTROLLED asthma? *NEED PAC APPT AT [facility] w/MAC*     18. Are you currently taking any blood thinners?     " 18a. No. Continue to 19.   18b. Yes/no Blood Thinner: No [CONTINUE TO #19]    N 19. Do you take the medication Phentermine?    19a. If yes, \"Hold for 7 days before procedure.  Please consult your prescribing provider if you have questions about holding this medication.\"     N  20. Do you have chronic kidney disease?      N  21. Do you have a diagnosis of diabetes?     Y  22. On a regular basis do you go 3-5 days between bowel movements?      23. Preferred LOCAL Pharmacy for Pre Prescription    [ LIST ONLY ONE PHARMACY]       [facility] - [address]        - CLOSING REMINDERS -    Informed patient they will need an adult    Cannot take any type of public or medical transportation alone    Conscious Sedation- Needs  for 6 hours after the procedure       MAC/General-Needs  for 24 hours after procedure    Pre-Procedure Covid test to be completed [[facility] PCR Testing Required]    Confirmed Nurse will call to complete assessment       - SCHEDULING DETAILS -   Hospital Setting Required? If yes, what is the exclusion?: NO   REYES  Surgeon    6/20/23  Date of Procedure  Upper and Lower Endoscopy [EGD and Colonoscopy]  [facility]   GOLY[facility]ELY EXTENDED - If you answer yes to questions #1, #3, #22 (Alexey Vanegasen and CF pts)Which Colonoscopy Prep was Sent?     MAC Sedation Type     N PAC / Pre-op Required                 "

## 2023-04-24 NOTE — PATIENT INSTRUCTIONS
We will schedule upper endoscopy and colonoscopy for further evaluation of iron deficiency anemia and uncontrolled acid reflux disease.  Please continue taking your medications for now and we will discuss neck steps after completing endoscopies.

## 2023-04-24 NOTE — LETTER
"    4/24/2023         RE: Huy Coles  [address]  [place] [address]        Dear Colleague,    Thank you for referring your patient, Huy Coles, to the [facility] [place]. Please see a copy of my visit note below.    Huy Coles is a 42 year old female who is being evaluated via a billable video visit.      Gastroenterology Visit for: Huy Coles 1980   MRN: [id number]  April 24, 2023      Huy Coles is a 42 year old female who is being evaluated via a billable video (or phone) visit.      The patient has been notified of following:     \"This video (or phone) visit will be conducted via a call between you and your physician/provider. We have found that certain health care needs can be provided without the need for an in-person physical exam.  This service lets us provide the care you need with a video conversation.  If a prescription is necessary we can send it directly to your pharmacy.  If lab work is needed we can place an order for that and you can then stop by our lab to have the test done at a later time.    If during the course of the call the physician/provider feels a video/phone visit is not appropriate, you will not be charged for this service.\"     Patient confirmed that they are in [place] for today's visit      Reason for Visit:  chief complaint GERD    Referred by: Sagar  / Karen [facility] [address] [place] [address]  Patient Care Team:  Eduardo Keith APRN CNP as PCP - General (Family Medicine)  Eduardo Keith APRN CNP as Nurse Practitioner (Family Medicine)  Jordan Hodges PA-C as Physician Assistant (Gastroenterology)    History of Present Illness:   Huy Coles is a 42 year old female.    This is a very pleasant 42-year-old female with past medical history of uncontrolled acid reflux symptoms who is referred to this clinic for further evaluation and management.  Patient reports having had upper endoscopy several " years ago where she was told she has hiatal hernia and she was noted to have mild esophagitis.  Currently she is taking omeprazole twice a day with frequent Tums and as needed Pepcid with symptoms occurring 2 to 3 days of a week.  She reports that during 5 days of the week when symptoms are controlled, they are at the best 80% controlled.  Overall she is doing poorly with symptoms including heartburn, regurgitation, vomiting, reflux, cough and chest comfort.  In the past she was told that she could get surgery however she did not get recommendation from her gastroenterologist.  She has tried multiple different proton pump inhibitor therapies including Prevacid and dexlansoprazole but she had insurance issues and cost constraint.  Therefore she had to switch back to omeprazole that she is taking currently.  She denies any family history of esophageal cancer.  She denies smoking.    She denies any melena, hematochezia, fresh blood in stool or abdominal pain.  She denies any significant dysphagia or odynophagia or weight loss.  She denies taking any NSAIDs.      Wt Readings from Last 5 Encounters:   04/24/23 99.8 kg (220 lb)   03/27/23 98.6 kg (217 lb 4.8 oz)        Esophageal Questionnaire(s)    BEDQ Questionnaire      4/17/2023    10:49 PM   BEDQ Questionnaire: How Often Have You Had the Following?   Trouble eating solid food (meat, bread, vegetables) 0   Trouble eating soft foods (yogurt, jello, pudding) 0   Trouble swallowing liquids 0   Pain while swallowing 0   Coughing or choking while swallowing foods or liquids 0   Total Score: 0         4/17/2023    10:49 PM   BEDQ Questionnaire: Discomfort/Pain Ratings   Eating solid food (meat, bread, vegetables) 0   Eating soft foods (yogurt, jello, pudding) 0   Drinking liquid 0   Total Score: 0       Eckardt Questionnaire      4/17/2023    10:51 PM   Eckardt Questionnaire   Dysphagia 0   Regurgitation 0   Retrosternal Pain 0   Weight Loss (kg) 0   Total Score:  0        Promis 10 Questionnaire      4/17/2023    10:53 PM   PROMIS 10 FLOWSHEET DATA   In general, would you say your health is: 4   In general, would you say your quality of life is: 4   In general, how would you rate your physical health? 3   In general, how would you rate your mental health, including your mood and your ability to think? 5   In general, how would you rate your satisfaction with your social activities and relationships? 5   In general, please rate how well you carry out your usual social activities and roles. (This includes activities at home, at work and in your community, and responsibilities as a parent, child, spouse, employee, friend, etc.) 5   To what extent are you able to carry out your everyday physical activities such as walking, climbing stairs, carrying groceries, or moving a chair? 4   In the past 7 days, how often have you been bothered by emotional problems such as feeling anxious, depressed, or irritable? 3   In the past 7 days, how would you rate your fatigue on average? 4   In the past 7 days, how would you rate your pain on average, where 0 means no pain, and 10 means worst imaginable pain? 0   Mental health question re-calculation - no clinical value 3   Physical health question re-calculation - no clinical value 2   Pain question re-calculation - no clinical value 5   Global Mental Health Score 17   Global Physical Health Score 14   PROMIS TOTAL - SUBSCORES 31           STUDIES & PROCEDURES:    No results found for: EXAMENDO, COLONOSCOPY         Prior medical records were reviewed including, but not limited to, notes from referring providers, lab work, radiographic tests, and other diagnostic tests. Pertinent results were summarized above.     History   No past medical history on file.    No past surgical history on file.    Social History     Socioeconomic History    Marital status:      Spouse name: Not on file    Number of children: Not on file    Years of education:  Not on file    Highest education level: Not on file   Occupational History    Not on file   Tobacco Use    Smoking status: Never    Smokeless tobacco: Never   Vaping Use    Vaping status: Not on file   Substance and Sexual Activity    Alcohol use: Not on file    Drug use: Not on file    Sexual activity: Not on file   Other Topics Concern    Not on file   Social History Narrative    Not on file     Social Determinants of Health     Financial Resource Strain: Not on file   Food Insecurity: Not on file   Transportation Needs: Not on file   Physical Activity: Not on file   Stress: Not on file   Social Connections: Not on file   Intimate Partner Violence: Not on file   Housing Stability: Not on file       No family history on file.  Family history reviewed and edited as appropriate    Medications and Allergies:     Outpatient Encounter Medications as of 4/24/2023   Medication Sig Dispense Refill    albuterol (PROAIR HFA/PROVENTIL HFA/VENTOLIN HFA) 108 (90 Base) MCG/ACT inhaler Inhale 2 puffs into the lungs every 6 hours as needed for shortness of breath, wheezing or cough 18 g 0    busPIRone (BUSPAR) 5 MG tablet Take 1-2 tablets (5-10 mg) by mouth 3 times daily 180 tablet 1    ferrous sulfate (FEROSUL) 325 (65 Fe) MG tablet Take 1 tablet (325 mg) by mouth Every Mon, Wed, Fri Morning for 90 days 36 tablet 0    fluticasone (FLONASE) 50 MCG/ACT nasal spray Spray 1 spray into both nostrils daily 18 mL 0    LORazepam (ATIVAN) 0.5 MG tablet Take 0.5 mg by mouth      omeprazole (PRILOSEC) 40 MG DR capsule Take 40 mg by mouth      sertraline (ZOLOFT) 100 MG tablet Take 1 tablet (100 mg) by mouth daily 90 tablet 1     No facility-administered encounter medications on file as of 4/24/2023.        Allergies   Allergen Reactions    Azithromycin Hives    Codeine Other (See Comments)     Drowsiness, severe    Tramadol Other (See Comments)     Confusion    Cefaclor Rash    Sulfa Drugs Hives and Rash        Review of systems:  A full 10  point review of systems was obtained and was negative except for the pertinent positives and negatives stated within the HPI.    Objective Findings:   Physical Exam:  Video visit  Constitutional: Wt 99.8 kg (220 lb)   BMI 35.51 kg/m    General: Alert, cooperative, no distress, well-appearing     Labs, Radiology, Pathology     Lab Results   Component Value Date    WBC 10.4 03/27/2023    HGB 8.1 (L) 03/27/2023     (H) 03/27/2023    CHOL 204 (H) 03/27/2023    TRIG 209 (H) 03/27/2023    HDL 44 (L) 03/27/2023    ALT 10 03/27/2023    AST 22 03/27/2023     03/27/2023    BUN 8.8 03/27/2023    CO2 23 03/27/2023    TSH 1.53 03/27/2023        Liver Function Studies -   Recent Labs   Lab Test 03/27/23  1521   PROTTOTAL 7.9   ALBUMIN 4.3   BILITOTAL 0.4   ALKPHOS 115*   AST 22   ALT 10          Patient Active Problem List    Diagnosis Date Noted    Pain of finger of right hand 04/20/2023     Priority: Medium    Orthopedic aftercare 04/20/2023     Priority: Medium    Esophagitis determined by endoscopy 08/28/2020     Priority: Medium    Closed fracture of nasal bones 04/22/2020     Priority: Medium     Last Assessment & Plan:   Formatting of this note might be different from the original.  Overall the patient appears to be healing quite nicely.  She no longer has her splint on.  I told her she needs to be careful as up to 6 weeks need to past before her nasal bones are likely to stay in place completely with any significant trauma.  I did explain that any fracture is more likely to refracture compared with native bone.    From my perspective she can return to relatively normal activity.  She should continue sinus precautions for up to 6 weeks.  This includes not blowing her nose and not bending pushing straining or sneezing if at all possible.    With respect to her nasal fractures, I do not need to see her unless she has other issues or concerns.  He is pleased with her overall appearance and her eyesight.  She  should call return with other issues or concerns.      Drug abuse, cocaine type (H) 04/22/2020     Priority: Medium     Last Assessment & Plan:   Formatting of this note might be different from the original.  The patient has a history of documented cocaine abuse.  She had a positive tox screen when she came to the ED on Saturday.  It is unclear how she obtained the injuries that she had but it is apparent that she had been under the influence at the time of which she sustained the injuries.    I did  her briefly on difficulties with healing related to cocaine use and recommended against future use.      Finger deformity, acquired, right 04/22/2020     Priority: Medium     Last Assessment & Plan:   Formatting of this note might be different from the original.  The patient has a residual deformity of the digit as previously described.  She had this treated by another hand surgeon and is wondering if something could be done.    I explained that due to the finding intercurrent therapy needs, I would like to wait several months before doing anything for this.  She may be a candidate for a derotational osteotomy or a closing wedge osteotomy in order to better occlude a fist as she is dropping change through the gap that is created by her canted ring finger.  I explained that ideally the original surgeon should treat this, however she stated that she is not interested in returning to the original surgeon.    For this reason I stated that we could address this at a later date.  Due to her history of substance abuse I would want a negative screen prior to any surgical intervention.  We can readdress this in 4 to 6 months when she has completed her therapy to assess whether or not she would benefit from additional intervention.  In the meantime I would like her to get the IP joints as supple as possible to see if this can help occlude this area.      Hiatal hernia 04/22/2020     Priority: Medium     Last Assessment &  Plan:   Formatting of this note might be different from the original.  The patient has significant and continued problems with reflux.  She has a history of a hiatal hernia which has previously been diagnosed.  She did ask if she could have a referral to someone to help treat this.  She previously saw 1 surgeon but has been since fired from that practice according to her.    I explained that 1 of my partners does treat hiatal hernia is usually with minimally invasive methods.  I did explain that some of her illicit substance use may create issues with that but I am happy to refer her on for a consultation at the very least.  I also explained that this may be viewed as a nonessential surgery and as a result she may have to wait to have this done.  She voiced understanding but would like the referral today.  I will provide this to Dr. Kyler Rockwell with general surgery in my office      Maxillary fracture, unspecified side, initial encounter for closed fracture (H) 04/22/2020     Priority: Medium     Last Assessment & Plan:   Formatting of this note might be different from the original.  The patient has bilateral nondisplaced maxillary fractures which communicate with the sinus.  She should continue her prophylactic antibiotics for her maxillary sinus fractures.  She should also perform sinus precautions including not blowing her nose, no bending pushing straining or pulling, and if she has to sneeze to keep her mouth open.    I explained that these are likely not going to displace and her periorbital ecchymosis will go away in about 2 weeks.  She has no ocular complaints that she has no change in projection or the appearance of her midface.    I do not anticipate she will need any surgical intervention for any of these issues.  She should call or return with other issues or concerns but otherwise I will plan to see her in 2 weeks and she should maintain her current restrictions.      Abnormality of heart beat  03/20/2019     Priority: Medium    Hair loss 08/14/2017     Priority: Medium     Last Assessment & Plan:   Formatting of this note might be different from the original.  TSH ordered      Fatigue 01/28/2016     Priority: Medium     Last Assessment & Plan:   Formatting of this note might be different from the original.  TSH ordered      Vitamin D deficiency 01/28/2016     Priority: Medium    Anxiety disorder 08/17/2012     Priority: Medium     Last Assessment & Plan:   Formatting of this note might be different from the original.  Stable, continue current medications.      Major depressive disorder 08/17/2012     Priority: Medium     Last Assessment & Plan:   Formatting of this note might be different from the original.  Stable, continue current medications.        Assessment and Plan   Assessment:    Huy Coles is 42 year old female.    1. Iron deficiency anemia due to chronic blood loss    2. Esophagitis determined by endoscopy    3. Hiatal hernia    4. Gastroesophageal reflux disease with esophagitis without hemorrhage       Patient with longstanding history of uncontrolled acid reflux disease requiring multiple medications.  She is currently taking omeprazole twice a day, several Tums and Pepcid as needed with less than 80% control of symptoms.  She has history of large hiatal hernia.  We discussed further evaluation with upper endoscopy due to uncontrolled symptoms and iron deficiency anemia as well as colonoscopy to rule out any underlying neoplasm, inflammation, ulcers versus other causes.  We also discussed obtaining surgical evaluation due to large hiatal hernia and persistent symptoms that are partially responsive to PPIs.  She is also afraid of long-term side effects from medications and due to insurance issues she is not able to take other agents including  Dexlansoprazole or rabeprazole.    Plan:  Plan for upper endoscopy and colonoscopy for evaluation of uncontrolled GERD and iron deficiency anemia  with monitored anesthesia care.        Follow up plan:   Return to clinic in 6 months or PRN    The risks and benefits of my recommendations, as well as other treatment options were discussed with the patient and any available family today. All questions were answered.     Follow up: As planned above. Today, I personally spent 14 minutes in direct virtual care (video visit) face to face time with the patient, of which greater than 50% of the time was spent in patient education and counseling as described above. Approximately 40 minutes were spent on indirect care associated with the patient's consultation including but not limited to review of: patient medical records to date, clinic visits, hospital records, lab results, imaging studies, procedural documentation, and coordinating care with other providers. The findings from this review are summarized in the above note. All of the above accounted for a cumulative time of 54 minutes and was performed on the date of service.     The patient verbalized understanding of the plan and was appreciative for the time spent and information provided during the virtual visit.      ----------------------------------------------------------------------------------------------------------------  Please note that the documentation was assisted by voice recognition software and documentation system. Manual proofreading was performed before finalization, however, voice recognition system related typos could have occurred and manually corrected when detected.         Again, thank you for allowing me to participate in the care of your patient.      Sincerely,    Danny Almodovar MD

## 2023-04-24 NOTE — NURSING NOTE
Is the patient currently in the state of [place]? YES    Visit mode:VIDEO    If the visit is dropped, the patient can be reconnected by: VIDEO VISIT: Text to cell phone: [phone]    Will anyone else be joining the visit? NO      How would you like to obtain your AVS? MyChart    Are changes needed to the allergy or medication list? NO    Reason for visit: Video Visit (New Visit)

## 2023-04-27 ENCOUNTER — THERAPY VISIT (OUTPATIENT)
Dept: OCCUPATIONAL THERAPY | Facility: CLINIC | Age: 43
End: 2023-04-27
Attending: NURSE PRACTITIONER
Payer: COMMERCIAL

## 2023-04-27 DIAGNOSIS — M79.644 PAIN OF FINGER OF RIGHT HAND: Primary | ICD-10-CM

## 2023-04-27 DIAGNOSIS — Z47.89 ORTHOPEDIC AFTERCARE: ICD-10-CM

## 2023-04-27 PROCEDURE — 97763 ORTHC/PROSTC MGMT SBSQ ENC: CPT | Mod: GO | Performed by: OCCUPATIONAL THERAPIST

## 2023-05-04 ENCOUNTER — PRE VISIT (OUTPATIENT)
Dept: GASTROENTEROLOGY | Facility: CLINIC | Age: 43
End: 2023-05-04

## 2023-06-05 ENCOUNTER — TELEPHONE (OUTPATIENT)
Dept: GASTROENTEROLOGY | Facility: CLINIC | Age: 43
End: 2023-06-05
Payer: COMMERCIAL

## 2023-06-05 DIAGNOSIS — D50.0 IRON DEFICIENCY ANEMIA DUE TO CHRONIC BLOOD LOSS: Primary | ICD-10-CM

## 2023-06-05 RX ORDER — BISACODYL 5 MG/1
TABLET, DELAYED RELEASE ORAL
Qty: 4 TABLET | Refills: 0 | Status: SHIPPED | OUTPATIENT
Start: 2023-06-05 | End: 2023-07-24

## 2023-06-07 ENCOUNTER — OFFICE VISIT (OUTPATIENT)
Dept: FAMILY MEDICINE | Facility: CLINIC | Age: 43
End: 2023-06-07
Payer: COMMERCIAL

## 2023-06-07 VITALS
DIASTOLIC BLOOD PRESSURE: 76 MMHG | HEIGHT: 66 IN | RESPIRATION RATE: 15 BRPM | SYSTOLIC BLOOD PRESSURE: 110 MMHG | OXYGEN SATURATION: 99 % | HEART RATE: 81 BPM | TEMPERATURE: 98 F | WEIGHT: 220 LBS | BODY MASS INDEX: 35.36 KG/M2

## 2023-06-07 DIAGNOSIS — D50.9 IRON DEFICIENCY ANEMIA, UNSPECIFIED IRON DEFICIENCY ANEMIA TYPE: ICD-10-CM

## 2023-06-07 DIAGNOSIS — K20.90 ESOPHAGITIS DETERMINED BY ENDOSCOPY: ICD-10-CM

## 2023-06-07 DIAGNOSIS — Z01.818 PREOP GENERAL PHYSICAL EXAM: Primary | ICD-10-CM

## 2023-06-07 DIAGNOSIS — K44.9 HIATAL HERNIA: ICD-10-CM

## 2023-06-07 LAB
ERYTHROCYTE [DISTWIDTH] IN BLOOD BY AUTOMATED COUNT: 18.6 % (ref 10–15)
HCT VFR BLD AUTO: 28.5 % (ref 35–47)
HGB BLD-MCNC: 8.2 G/DL (ref 11.7–15.7)
MCH RBC QN AUTO: 19.3 PG (ref 26.5–33)
MCHC RBC AUTO-ENTMCNC: 28.8 G/DL (ref 31.5–36.5)
MCV RBC AUTO: 67 FL (ref 78–100)
PLATELET # BLD AUTO: 381 10E3/UL (ref 150–450)
RBC # BLD AUTO: 4.25 10E6/UL (ref 3.8–5.2)
WBC # BLD AUTO: 11.8 10E3/UL (ref 4–11)

## 2023-06-07 PROCEDURE — 36415 COLL VENOUS BLD VENIPUNCTURE: CPT | Performed by: NURSE PRACTITIONER

## 2023-06-07 PROCEDURE — 99214 OFFICE O/P EST MOD 30 MIN: CPT | Performed by: NURSE PRACTITIONER

## 2023-06-07 PROCEDURE — 85027 COMPLETE CBC AUTOMATED: CPT | Performed by: NURSE PRACTITIONER

## 2023-06-07 ASSESSMENT — PAIN SCALES - GENERAL: PAINLEVEL: NO PAIN (0)

## 2023-06-07 NOTE — PROGRESS NOTES
[address]  [address]  [place] [address]  Phone: [phone]  Fax: [phone]  Primary Provider: Eduardo Bautista  Pre-op Performing Provider: EDUARDO BAUTISTA      PREOPERATIVE EVALUATION:  Today's date: 6/7/2023    Huy Coles is a 42 year old female who presents for a preoperative evaluation.      6/7/2023     4:13 PM   Additional Questions   Roomed by Judi Batista     Forms 6/7/2023   Any forms needing to be completed Yes     Surgical Information:  Surgery/Procedure: Colonoscopy/ENDOSCOPY  Surgery Location:  GI   Surgeon: Danny Almodovar MD  Surgery Date: 6/20/23  Time of Surgery: 2:15pm  Where patient plans to recover: At home with family  Fax number for surgical facility: Note does not need to be faxed, will be available electronically in Epic.    Assessment & Plan     The proposed surgical procedure is considered LOW risk.    Preop general physical exam  preoperative examination wnl; no concerning health issues identified prior to surgery; reviewed cardiac risk, chronic health history, medications and discussed presurgical medication management. Patient deemed medically stable for planned surgery.  Plan: will check the following: - CBC with platelets 2/2 mild anemia; Scr stable;       Hiatal hernia  Esophagitis determined by endoscopy  Transferring care to  from out of state and local health system; hx of chronic uncontrolled GERD; failed multiple PPI therapies; prior endoscopy suggest moderate H. Hernia per patient surgery was not an option w/prior out of state GI team following GI since 2006; EGD 2020; currently on omeprazole 40 mg BID      Iron deficiency anemia, unspecified iron deficiency anemia type  2/2 above; currently on iron supplement; check follow up in 2 months; will repeat H/H prior to procedure;   -CBC with platelets  - PRIMARY CARE FOLLOW-UP SCHEDULING  - CBC with platelets         - No identified additional risk factors other than  previously addressed    Antiplatelet or Anticoagulation Medication Instructions:   - Patient is on no antiplatelet or anticoagulation medications.    Additional Medication Instructions:   - fiber, laxatives: HOLD day of surgery   - SSRIs, SNRIs, TCAs, Antipsychotics: Continue without modification.    - rescue Inhaler: Continue PRN. Bring to hospital on the day of surgery.    RECOMMENDATION:  APPROVAL GIVEN to proceed with proposed procedure, without further diagnostic evaluation.            Subjective   HPI related to upcoming procedure: Upper and lower endoscopy r/t     -Hiatal hernia  - Esophagitis determined by endoscopy  Transferring care to  from out of [facility] and local health system; hx of chronic uncontrolled GERD; failed multiple PPI therapies; prior endoscopy suggest moderate H. Hernia per patient surgery was not an option w/prior out of state GI team following GI since 2006; EGD 2020; currently on omeprazole 40 mg BID  = Iron deficiency anemia, unspecified iron deficiency anemia type  2/2 above; currently on iron supplement; check follow up in 2 months; will repeat H/H prior to procedure;   -CBC with platelets  - PRIMARY CARE FOLLOW-UP SCHEDULING          6/1/2023    11:09 AM   Preop Questions   1. Have you ever had a heart attack or stroke? No   2. Have you ever had surgery on your heart or blood vessels, such as a stent placement, a coronary artery bypass, or surgery on an artery in your head, neck, heart, or legs? No   3. Do you have chest pain with activity? No   4. Do you have a history of  heart failure? No   5. Do you currently have a cold, bronchitis or symptoms of other infection? No   6. Do you have a cough, shortness of breath, or wheezing? No   7. Do you or anyone in your family have previous history of blood clots? No   8. Do you or does anyone in your family have a serious bleeding problem such as prolonged bleeding following surgeries or cuts? No   9. Have you ever had problems with anemia  or been told to take iron pills? YES -    10. Have you had any abnormal blood loss such as black, tarry or bloody stools, or abnormal vaginal bleeding? No   11. Have you ever had a blood transfusion? No   12. Are you willing to have a blood transfusion if it is medically needed before, during, or after your surgery? Yes   13. Have you or any of your relatives ever had problems with anesthesia? No   14. Do you have sleep apnea, excessive snoring or daytime drowsiness? No   15. Do you have any artifical heart valves or other implanted medical devices like a pacemaker, defibrillator, or continuous glucose monitor? No   16. Do you have artificial joints? No   17. Are you allergic to latex? No   18. Is there any chance that you may be pregnant? No       Health Care Directive:  Patient does not have a Health Care Directive or Living Will: Discussed advance care planning with patient; however, patient declined at this time.    Preoperative Review of :   reviewed - no record of controlled substances prescribed.      Status of Chronic Conditions:  ANEMIA - Patient has a recent history of moderate-severe anemia, which has beenslightly symptomatic. Work up to date has revealed JENIFFER . Treatment has been oral ferrous sulfate three times a week; ?r/t GERD/PUD; pending scope; follow up labs in 3 months.     ASTHMA - Patient has a longstanding history of moderate-severe Asthma . Patient has been doing well overall noting NO SYMPTOMS and continues on medication regimen consisting of albuterol prn without adverse reactions or side effects.     DEPRESSION - Patient has a long history of Depression of moderate severity requiring medication for control with recent symptoms being stable..Current symptoms of depression include none.       Review of Systems  CONSTITUTIONAL: NEGATIVE for fever, chills, change in weight  INTEGUMENTARY/SKIN: NEGATIVE for worrisome rashes, moles or lesions  EYES: NEGATIVE for vision changes or  irritation  ENT/MOUTH: NEGATIVE for ear, mouth and throat problems  RESP: NEGATIVE for significant cough or SOB  CV: NEGATIVE for chest pain, palpitations or peripheral edema  GI: NEGATIVE for nausea, abdominal pain, heartburn, or change in bowel habits  : NEGATIVE for frequency, dysuria, or hematuria  MUSCULOSKELETAL: NEGATIVE for significant arthralgias or myalgia  NEURO: NEGATIVE for weakness, dizziness or paresthesias  ENDOCRINE: NEGATIVE for temperature intolerance, skin/hair changes  HEME: NEGATIVE for bleeding problems  PSYCHIATRIC: NEGATIVE for changes in mood or affect    Patient Active Problem List    Diagnosis Date Noted     Pain of finger of right hand 04/20/2023     Priority: Medium     Orthopedic aftercare 04/20/2023     Priority: Medium     Esophagitis determined by endoscopy 08/28/2020     Priority: Medium     Closed fracture of nasal bones 04/22/2020     Priority: Medium     Last Assessment & Plan:   Formatting of this note might be different from the original.  Overall the patient appears to be healing quite nicely.  She no longer has her splint on.  I told her she needs to be careful as up to 6 weeks need to past before her nasal bones are likely to stay in place completely with any significant trauma.  I did explain that any fracture is more likely to refracture compared with native bone.    From my perspective she can return to relatively normal activity.  She should continue sinus precautions for up to 6 weeks.  This includes not blowing her nose and not bending pushing straining or sneezing if at all possible.    With respect to her nasal fractures, I do not need to see her unless she has other issues or concerns.  He is pleased with her overall appearance and her eyesight.  She should call return with other issues or concerns.       Drug abuse, cocaine type (H) 04/22/2020     Priority: Medium     Last Assessment & Plan:   Formatting of this note might be different from the original.  The  patient has a history of documented cocaine abuse.  She had a positive tox screen when she came to the ED on Saturday.  It is unclear how she obtained the injuries that she had but it is apparent that she had been under the influence at the time of which she sustained the injuries.    I did  her briefly on difficulties with healing related to cocaine use and recommended against future use.       Finger deformity, acquired, right 04/22/2020     Priority: Medium     Last Assessment & Plan:   Formatting of this note might be different from the original.  The patient has a residual deformity of the digit as previously described.  She had this treated by another hand surgeon and is wondering if something could be done.    I explained that due to the finding intercurrent therapy needs, I would like to wait several months before doing anything for this.  She may be a candidate for a derotational osteotomy or a closing wedge osteotomy in order to better occlude a fist as she is dropping change through the gap that is created by her canted ring finger.  I explained that ideally the original surgeon should treat this, however she stated that she is not interested in returning to the original surgeon.    For this reason I stated that we could address this at a later date.  Due to her history of substance abuse I would want a negative screen prior to any surgical intervention.  We can readdress this in 4 to 6 months when she has completed her therapy to assess whether or not she would benefit from additional intervention.  In the meantime I would like her to get the IP joints as supple as possible to see if this can help occlude this area.       Hiatal hernia 04/22/2020     Priority: Medium     Last Assessment & Plan:   Formatting of this note might be different from the original.  The patient has significant and continued problems with reflux.  She has a history of a hiatal hernia which has previously been diagnosed.   She did ask if she could have a referral to someone to help treat this.  She previously saw 1 surgeon but has been since fired from that practice according to her.    I explained that 1 of my partners does treat hiatal hernia is usually with minimally invasive methods.  I did explain that some of her illicit substance use may create issues with that but I am happy to refer her on for a consultation at the very least.  I also explained that this may be viewed as a nonessential surgery and as a result she may have to wait to have this done.  She voiced understanding but would like the referral today.  I will provide this to Dr. Kyler Rockwell with general surgery in my office       Maxillary fracture, unspecified side, initial encounter for closed fracture (H) 04/22/2020     Priority: Medium     Last Assessment & Plan:   Formatting of this note might be different from the original.  The patient has bilateral nondisplaced maxillary fractures which communicate with the sinus.  She should continue her prophylactic antibiotics for her maxillary sinus fractures.  She should also perform sinus precautions including not blowing her nose, no bending pushing straining or pulling, and if she has to sneeze to keep her mouth open.    I explained that these are likely not going to displace and her periorbital ecchymosis will go away in about 2 weeks.  She has no ocular complaints that she has no change in projection or the appearance of her midface.    I do not anticipate she will need any surgical intervention for any of these issues.  She should call or return with other issues or concerns but otherwise I will plan to see her in 2 weeks and she should maintain her current restrictions.       Abnormality of heart beat 03/20/2019     Priority: Medium     Hair loss 08/14/2017     Priority: Medium     Last Assessment & Plan:   Formatting of this note might be different from the original.  TSH ordered       Fatigue 01/28/2016      Priority: Medium     Last Assessment & Plan:   Formatting of this note might be different from the original.  TSH ordered       Vitamin D deficiency 01/28/2016     Priority: Medium     Anxiety disorder 08/17/2012     Priority: Medium     Last Assessment & Plan:   Formatting of this note might be different from the original.  Stable, continue current medications.       Major depressive disorder 08/17/2012     Priority: Medium     Last Assessment & Plan:   Formatting of this note might be different from the original.  Stable, continue current medications.        Past Medical History:   Diagnosis Date     Depressive disorder 1999     Past Surgical History:   Procedure Laterality Date     CHOLECYSTECTOMY  2006     ENT SURGERY  1998    Tonsills and adenoids removed, sinus surgery     ORTHOPEDIC SURGERY  2020     Current Outpatient Medications   Medication Sig Dispense Refill     albuterol (PROAIR HFA/PROVENTIL HFA/VENTOLIN HFA) 108 (90 Base) MCG/ACT inhaler Inhale 2 puffs into the lungs every 6 hours as needed for shortness of breath, wheezing or cough 18 g 0     bisacodyl (DULCOLAX) 5 MG EC tablet 2 days prior to procedure, take 2 tablets at 4 pm. 1 day prior to procedure, take 2 tablets at 4 pm. For additional instructions refer to your colonoscopy prep instructions. 4 tablet 0     busPIRone (BUSPAR) 5 MG tablet Take 1-2 tablets (5-10 mg) by mouth 3 times daily 180 tablet 1     ferrous sulfate (FEROSUL) 325 (65 Fe) MG tablet Take 1 tablet (325 mg) by mouth Every Mon, Wed, Fri Morning for 90 days (Patient taking differently: Take 325 mg by mouth Every Mon, Wed, Fri Morning Not taking a week prior to colonoscopy) 36 tablet 0     fluticasone (FLONASE) 50 MCG/ACT nasal spray Spray 1 spray into both nostrils daily 18 mL 0     omeprazole (PRILOSEC) 40 MG DR capsule Take 40 mg by mouth       polyethylene glycol (GOLYTELY) 236 g suspension 2 days prior at 5pm, mix and drink half of a jug of Golytely. Drink an 8 oz. glass of  "Golytely every 15 minutes until half of the jug is gone. Place remainder of Golytely in the refrigerator. 1 day prior at 5 pm, drink the 2nd half of a jug of Golytely bowel prep. 6 hours before your check-in time, drink an 8 oz. glass of Golytely every 15 minutes until half of the 2nd jug of Golytely is gone. Discard remainder of second jug. 8000 mL 0     sertraline (ZOLOFT) 100 MG tablet Take 1 tablet (100 mg) by mouth daily 90 tablet 1       Allergies   Allergen Reactions     Azithromycin Hives     Codeine Other (See Comments)     Drowsiness, severe     Tramadol Other (See Comments)     Confusion     Cefaclor Rash     Sulfa Antibiotics Hives and Rash        Social History     Tobacco Use     Smoking status: Never     Smokeless tobacco: Never   Vaping Use     Vaping status: Never Used   Substance Use Topics     Alcohol use: Never     Family History   Problem Relation Age of Onset     Depression Mother      Mental Illness Mother      Substance Abuse Mother      Osteoporosis Mother      Osteoporosis Maternal Grandmother      Colon Cancer Paternal Grandmother      Depression Son      Anxiety Disorder Son      History   Drug Use Unknown         Objective     /76 (BP Location: Right arm, Patient Position: Sitting, Cuff Size: Adult Large)   Pulse 81   Temp 98  F (36.7  C) (Temporal)   Resp 15   Ht 1.664 m (5' 5.5\")   Wt 99.8 kg (220 lb)   SpO2 99%   BMI 36.05 kg/m      Physical Exam    GENERAL APPEARANCE: healthy, alert and no distress     EYES: EOMI, PERRL     HENT: ear canals and TM's normal and nose and mouth without ulcers or lesions     NECK: no adenopathy, no asymmetry, masses, or scars and thyroid normal to palpation     RESP: lungs clear to auscultation - no rales, rhonchi or wheezes     CV: regular rates and rhythm, normal S1 S2, no S3 or S4 and no murmur, click or rub     ABDOMEN:  soft, nontender, no HSM or masses and bowel sounds normal     MS: extremities normal- no gross deformities noted, no " evidence of inflammation in joints, FROM in all extremities.     SKIN: no suspicious lesions or rashes     NEURO: Normal strength and tone, sensory exam grossly normal, mentation intact and speech normal     PSYCH: mentation appears normal. and affect normal/bright     LYMPHATICS: No cervical adenopathy    Recent Labs   Lab Test 03/27/23  1521   HGB 8.1*   *      POTASSIUM 3.8   CR 0.83   A1C 5.8*        Diagnostics:  Labs pending at this time.  Results will be reviewed when available.   No EKG required, no history of coronary heart disease, significant arrhythmia, peripheral arterial disease or other structural heart disease.    Revised Cardiac Risk Index (RCRI):  The patient has the following serious cardiovascular risks for perioperative complications:   - No serious cardiac risks = 0 points     RCRI Interpretation: 0 points: Class I (very low risk - 0.4% complication rate)           Signed Electronically by: MATILDA Davis CNP  Copy of this evaluation report is provided to requesting physician.

## 2023-06-20 ENCOUNTER — ANESTHESIA EVENT (OUTPATIENT)
Dept: GASTROENTEROLOGY | Facility: CLINIC | Age: 43
End: 2023-06-20
Payer: COMMERCIAL

## 2023-06-20 ENCOUNTER — HOSPITAL ENCOUNTER (OUTPATIENT)
Facility: CLINIC | Age: 43
Discharge: HOME | End: 2023-06-20
Attending: INTERNAL MEDICINE | Admitting: INTERNAL MEDICINE
Payer: COMMERCIAL

## 2023-06-20 ENCOUNTER — ANESTHESIA (OUTPATIENT)
Dept: GASTROENTEROLOGY | Facility: CLINIC | Age: 43
End: 2023-06-20
Payer: COMMERCIAL

## 2023-06-20 VITALS
BODY MASS INDEX: 36.65 KG/M2 | RESPIRATION RATE: 14 BRPM | OXYGEN SATURATION: 100 % | DIASTOLIC BLOOD PRESSURE: 68 MMHG | HEIGHT: 65 IN | HEART RATE: 55 BPM | SYSTOLIC BLOOD PRESSURE: 111 MMHG | WEIGHT: 220 LBS

## 2023-06-20 DIAGNOSIS — D50.9 IRON DEFICIENCY ANEMIA, UNSPECIFIED IRON DEFICIENCY ANEMIA TYPE: Primary | ICD-10-CM

## 2023-06-20 LAB
COLONOSCOPY: NORMAL
UPPER GI ENDOSCOPY: NORMAL

## 2023-06-20 PROCEDURE — 88305 TISSUE EXAM BY PATHOLOGIST: CPT | Mod: 26 | Performed by: PATHOLOGY

## 2023-06-20 PROCEDURE — 999N000010 HC STATISTIC ANES STAT CODE-CRNA PER MINUTE: Performed by: INTERNAL MEDICINE

## 2023-06-20 PROCEDURE — 250N000009 HC RX 250

## 2023-06-20 PROCEDURE — 370N000017 HC ANESTHESIA TECHNICAL FEE, PER MIN: Performed by: INTERNAL MEDICINE

## 2023-06-20 PROCEDURE — 43239 EGD BIOPSY SINGLE/MULTIPLE: CPT | Performed by: INTERNAL MEDICINE

## 2023-06-20 PROCEDURE — 88305 TISSUE EXAM BY PATHOLOGIST: CPT | Mod: TC | Performed by: INTERNAL MEDICINE

## 2023-06-20 PROCEDURE — 45378 DIAGNOSTIC COLONOSCOPY: CPT | Performed by: INTERNAL MEDICINE

## 2023-06-20 PROCEDURE — 258N000003 HC RX IP 258 OP 636

## 2023-06-20 PROCEDURE — 250N000011 HC RX IP 250 OP 636

## 2023-06-20 RX ORDER — PROCHLORPERAZINE MALEATE 10 MG
10 TABLET ORAL EVERY 6 HOURS PRN
Status: DISCONTINUED | OUTPATIENT
Start: 2023-06-20 | End: 2023-06-20 | Stop reason: HOSPADM

## 2023-06-20 RX ORDER — PROPOFOL 10 MG/ML
INJECTION, EMULSION INTRAVENOUS PRN
Status: DISCONTINUED | OUTPATIENT
Start: 2023-06-20 | End: 2023-06-20

## 2023-06-20 RX ORDER — FLUMAZENIL 0.1 MG/ML
0.2 INJECTION, SOLUTION INTRAVENOUS
Status: DISCONTINUED | OUTPATIENT
Start: 2023-06-20 | End: 2023-06-20 | Stop reason: HOSPADM

## 2023-06-20 RX ORDER — NALOXONE HYDROCHLORIDE 0.4 MG/ML
0.4 INJECTION, SOLUTION INTRAMUSCULAR; INTRAVENOUS; SUBCUTANEOUS
Status: DISCONTINUED | OUTPATIENT
Start: 2023-06-20 | End: 2023-06-20 | Stop reason: HOSPADM

## 2023-06-20 RX ORDER — SODIUM CHLORIDE, SODIUM LACTATE, POTASSIUM CHLORIDE, CALCIUM CHLORIDE 600; 310; 30; 20 MG/100ML; MG/100ML; MG/100ML; MG/100ML
INJECTION, SOLUTION INTRAVENOUS CONTINUOUS PRN
Status: DISCONTINUED | OUTPATIENT
Start: 2023-06-20 | End: 2023-06-20

## 2023-06-20 RX ORDER — ONDANSETRON 2 MG/ML
INJECTION INTRAMUSCULAR; INTRAVENOUS PRN
Status: DISCONTINUED | OUTPATIENT
Start: 2023-06-20 | End: 2023-06-20

## 2023-06-20 RX ORDER — PROPOFOL 10 MG/ML
INJECTION, EMULSION INTRAVENOUS CONTINUOUS PRN
Status: DISCONTINUED | OUTPATIENT
Start: 2023-06-20 | End: 2023-06-20

## 2023-06-20 RX ORDER — ONDANSETRON 2 MG/ML
4 INJECTION INTRAMUSCULAR; INTRAVENOUS EVERY 6 HOURS PRN
Status: DISCONTINUED | OUTPATIENT
Start: 2023-06-20 | End: 2023-06-20 | Stop reason: HOSPADM

## 2023-06-20 RX ORDER — ONDANSETRON 4 MG/1
4 TABLET, ORALLY DISINTEGRATING ORAL EVERY 6 HOURS PRN
Status: DISCONTINUED | OUTPATIENT
Start: 2023-06-20 | End: 2023-06-20 | Stop reason: HOSPADM

## 2023-06-20 RX ORDER — ONDANSETRON 2 MG/ML
4 INJECTION INTRAMUSCULAR; INTRAVENOUS
Status: DISCONTINUED | OUTPATIENT
Start: 2023-06-20 | End: 2023-06-20 | Stop reason: HOSPADM

## 2023-06-20 RX ORDER — NALOXONE HYDROCHLORIDE 0.4 MG/ML
0.2 INJECTION, SOLUTION INTRAMUSCULAR; INTRAVENOUS; SUBCUTANEOUS
Status: DISCONTINUED | OUTPATIENT
Start: 2023-06-20 | End: 2023-06-20 | Stop reason: HOSPADM

## 2023-06-20 RX ORDER — DEXMEDETOMIDINE HYDROCHLORIDE 4 UG/ML
INJECTION, SOLUTION INTRAVENOUS PRN
Status: DISCONTINUED | OUTPATIENT
Start: 2023-06-20 | End: 2023-06-20

## 2023-06-20 RX ORDER — LIDOCAINE 40 MG/G
CREAM TOPICAL
Status: DISCONTINUED | OUTPATIENT
Start: 2023-06-20 | End: 2023-06-20 | Stop reason: HOSPADM

## 2023-06-20 RX ORDER — LIDOCAINE HYDROCHLORIDE 20 MG/ML
INJECTION, SOLUTION INFILTRATION; PERINEURAL PRN
Status: DISCONTINUED | OUTPATIENT
Start: 2023-06-20 | End: 2023-06-20

## 2023-06-20 RX ADMIN — PROPOFOL 200 MCG/KG/MIN: 10 INJECTION, EMULSION INTRAVENOUS at 14:09

## 2023-06-20 RX ADMIN — PROPOFOL 30 MG: 10 INJECTION, EMULSION INTRAVENOUS at 14:24

## 2023-06-20 RX ADMIN — DEXMEDETOMIDINE HYDROCHLORIDE 4 MCG: 200 INJECTION INTRAVENOUS at 14:09

## 2023-06-20 RX ADMIN — SODIUM CHLORIDE, POTASSIUM CHLORIDE, SODIUM LACTATE AND CALCIUM CHLORIDE: 600; 310; 30; 20 INJECTION, SOLUTION INTRAVENOUS at 14:06

## 2023-06-20 RX ADMIN — LIDOCAINE HYDROCHLORIDE 80 MG: 20 INJECTION, SOLUTION INFILTRATION; PERINEURAL at 14:11

## 2023-06-20 RX ADMIN — ONDANSETRON 4 MG: 2 INJECTION INTRAMUSCULAR; INTRAVENOUS at 14:09

## 2023-06-20 RX ADMIN — LIDOCAINE HYDROCHLORIDE 20 MG: 20 INJECTION, SOLUTION INFILTRATION; PERINEURAL at 14:07

## 2023-06-20 RX ADMIN — PHENYLEPHRINE HYDROCHLORIDE 100 MCG: 10 INJECTION INTRAVENOUS at 14:20

## 2023-06-20 ASSESSMENT — ENCOUNTER SYMPTOMS: SEIZURES: 0

## 2023-06-20 ASSESSMENT — LIFESTYLE VARIABLES: TOBACCO_USE: 0

## 2023-06-20 ASSESSMENT — ACTIVITIES OF DAILY LIVING (ADL): ADLS_ACUITY_SCORE: 35

## 2023-06-20 NOTE — ANESTHESIA CARE TRANSFER NOTE
Patient: Huy Coles    Procedure: Procedure(s):  Colonoscopy  Esophagoscopy, gastroscopy, duodenoscopy (EGD), combined       Diagnosis: Esophagitis determined by endoscopy [K20.90]  Hiatal hernia [K44.9]  Gastroesophageal reflux disease with esophagitis without hemorrhage [K21.00]  Iron deficiency anemia due to chronic blood loss [D50.0]  Diagnosis Additional Information: No value filed.    Anesthesia Type:   MAC     Note:    Oropharynx: oropharynx clear of all foreign objects and spontaneously breathing  Level of Consciousness: awake  Oxygen Supplementation: face mask  Level of Supplemental Oxygen (L/min / FiO2): 6  Independent Airway: airway patency satisfactory and stable  Dentition: dentition unchanged  Vital Signs Stable: post-procedure vital signs reviewed and stable  Report to RN Given: handoff report given  Patient transferred to: PACU    Handoff Report: Identifed the Patient, Identified the Reponsible Provider, Reviewed the pertinent medical history, Discussed the surgical course, Reviewed Intra-OP anesthesia mangement and issues during anesthesia, Set expectations for post-procedure period and Allowed opportunity for questions and acknowledgement of understanding      Vitals:  Vitals Value Taken Time   BP 96/57 06/20/23 1439   Temp     Pulse 60 06/20/23 1439   Resp 17 06/20/23 1439   SpO2 96 % 06/20/23 1439   Vitals shown include unvalidated device data.    Electronically Signed By: MATILDA Ashby CRNA  June 20, 2023  2:40 PM

## 2023-06-20 NOTE — H&P
Pre-Endoscopy History and Physical     Huy Coles MRN# [id number]   YOB: 1980 Age: 42 year old     Date of Procedure: 6/20/2023  Primary care provider: Eduardo Keith  Type of Endoscopy: Colonoscopy with possible biopsy, possible polypectomy and Esophagogastroduodenoscopy with possible biopsy, possible dilation, possible foreign body removal  Reason for Procedure: GERD, esophagitis, Iron deficiency anemia  Type of Anesthesia Anticipated: Per anesthesia     HPI:    Huy is a 42 year old female who will be undergoing the above procedure.      A history and physical has been performed. The patient's medications and allergies have been reviewed. The risks and benefits of the procedure and the sedation options and risks were discussed with the patient.  All questions were answered and informed consent was obtained.      She denies a personal or family history of anesthesia complications or bleeding disorders.     Patient Active Problem List   Diagnosis     Abnormality of heart beat     Anxiety disorder     Closed fracture of nasal bones     Drug abuse, cocaine type (H)     Fatigue     Finger deformity, acquired, right     Esophagitis determined by endoscopy     Hair loss     Hiatal hernia     Major depressive disorder     Maxillary fracture, unspecified side, initial encounter for closed fracture (H)     Vitamin D deficiency     Pain of finger of right hand     Orthopedic aftercare        Past Medical History:   Diagnosis Date     Depressive disorder 1999        Past Surgical History:   Procedure Laterality Date     CHOLECYSTECTOMY  2006     ENT SURGERY  1998    Tonsills and adenoids removed, sinus surgery     ORTHOPEDIC SURGERY  2020       Social History     Tobacco Use     Smoking status: Never     Smokeless tobacco: Never   Vaping Use     Vaping status: Never Used   Substance Use Topics     Alcohol use: Never       Family History   Problem Relation Age of Onset     Depression Mother      Mental  Illness Mother      Substance Abuse Mother      Osteoporosis Mother      Osteoporosis Maternal Grandmother      Colon Cancer Paternal Grandmother      Depression Son      Anxiety Disorder Son        Prior to Admission medications    Medication Sig Start Date End Date Taking? Authorizing Provider   albuterol (PROAIR HFA/PROVENTIL HFA/VENTOLIN HFA) 108 (90 Base) MCG/ACT inhaler Inhale 2 puffs into the lungs every 6 hours as needed for shortness of breath, wheezing or cough 3/27/23   Eduardo Keith APRN CNP   bisacodyl (DULCOLAX) 5 MG EC tablet 2 days prior to procedure, take 2 tablets at 4 pm. 1 day prior to procedure, take 2 tablets at 4 pm. For additional instructions refer to your colonoscopy prep instructions. 6/5/23   Danny Almodovar MD   busPIRone (BUSPAR) 5 MG tablet Take 1-2 tablets (5-10 mg) by mouth 3 times daily 3/27/23   Eduardo Keith APRN CNP   ferrous sulfate (FEROSUL) 325 (65 Fe) MG tablet Take 1 tablet (325 mg) by mouth Every Mon, Wed, Fri Morning for 90 days  Patient taking differently: Take 325 mg by mouth Every Mon, Wed, Fri Morning Not taking a week prior to colonoscopy 4/3/23 7/2/23  Eduardo Keith APRN CNP   fluticasone (FLONASE) 50 MCG/ACT nasal spray Spray 1 spray into both nostrils daily 3/27/23   Eduardo Keith APRN CNP   omeprazole (PRILOSEC) 40 MG DR capsule Take 40 mg by mouth 6/9/21   Reported, Patient   polyethylene glycol (GOLYTELY) 236 g suspension 2 days prior at 5pm, mix and drink half of a jug of Golytely. Drink an 8 oz. glass of Golytely every 15 minutes until half of the jug is gone. Place remainder of Golytely in the refrigerator. 1 day prior at 5 pm, drink the 2nd half of a jug of Golytely bowel prep. 6 hours before your check-in time, drink an 8 oz. glass of Golytely every 15 minutes until half of the 2nd jug of Golytely is gone. Discard remainder of second jug. 6/5/23   Danny Almodovar MD   sertraline (ZOLOFT) 100 MG tablet Take 1 tablet (100 mg) by mouth daily  "3/27/23   Eduardo Keith, MATILDA CNP       Allergies   Allergen Reactions     Azithromycin Hives     Codeine Other (See Comments)     Drowsiness, severe     Tramadol Other (See Comments)     Confusion     Cefaclor Rash     Sulfa Antibiotics Hives and Rash        REVIEW OF SYSTEMS:   5 point ROS negative except as noted above in HPI, including Gen., Resp., CV, GI &  system review.    PHYSICAL EXAM:   There were no vitals taken for this visit. Estimated body mass index is 36.05 kg/m  as calculated from the following:    Height as of 6/7/23: 1.664 m (5' 5.5\").    Weight as of 6/7/23: 99.8 kg (220 lb).   GENERAL APPEARANCE: alert, and oriented  MENTAL STATUS: alert  AIRWAY EXAM: Mallampatti Class III (visualization of the soft palate and base of uvula)  RESP: lungs clear to auscultation - no rales, rhonchi or wheezes  CV: regular rates and rhythm  DIAGNOSTICS:    Not indicated    IMPRESSION   ASA Class 3 - Severe systemic disease, but not incapacitating    PLAN:   Plan for Colonoscopy with possible biopsy, possible polypectomy and Esophagogastroduodenoscopy with possible biopsy, possible dilation, possible foreign body removal. We discussed the risks, benefits and alternatives and the patient wished to proceed.    The above has been forwarded to the consulting provider.      Signed Electronically by: Danny Almodovar MD  June 20, 2023                "

## 2023-06-20 NOTE — ANESTHESIA POSTPROCEDURE EVALUATION
Patient: Huy Coles    Procedure: Procedure(s):  Colonoscopy  Esophagoscopy, gastroscopy, duodenoscopy (EGD), combined       Anesthesia Type:  MAC    Note:     Postop Pain Control: Uneventful            Sign Out: Well controlled pain   PONV: No   Neuro/Psych: Uneventful            Sign Out: Acceptable/Baseline neuro status   Airway/Respiratory: Uneventful            Sign Out: Acceptable/Baseline resp. status   CV/Hemodynamics: Uneventful            Sign Out: Acceptable CV status; No obvious hypovolemia; No obvious fluid overload   Other NRE: NONE   DID A NON-ROUTINE EVENT OCCUR? No           Last vitals:  Vitals Value Taken Time   /68 06/20/23 1500   Temp     Pulse 54 06/20/23 1503   Resp 20 06/20/23 1505   SpO2 99 % 06/20/23 1504   Vitals shown include unvalidated device data.    Electronically Signed By: Nahid Dhillon MD  June 20, 2023  3:52 PM

## 2023-06-20 NOTE — ANESTHESIA PREPROCEDURE EVALUATION
Anesthesia Pre-Procedure Evaluation    Patient: Huy Coles   MRN: [id number] : 1980        Procedure : Procedure(s):  Colonoscopy  Esophagoscopy, gastroscopy, duodenoscopy (EGD), combined          Past Medical History:   Diagnosis Date     Depressive disorder      Gastroesophageal reflux disease with esophagitis       Past Surgical History:   Procedure Laterality Date     CHOLECYSTECTOMY  2006     ENT SURGERY  1998    Tonsills and adenoids removed, sinus surgery     ORTHOPEDIC SURGERY        Allergies   Allergen Reactions     Azithromycin Hives     Codeine Other (See Comments)     Drowsiness, severe     Tramadol Other (See Comments)     Confusion     Cefaclor Rash     Sulfa Antibiotics Hives and Rash      Social History     Tobacco Use     Smoking status: Never     Smokeless tobacco: Never   Vaping Use     Vaping status: Never Used   Substance Use Topics     Alcohol use: Yes     Comment: occassional      Wt Readings from Last 1 Encounters:   23 99.8 kg (220 lb)        Anesthesia Evaluation   Pt has had prior anesthetic.     No history of anesthetic complications       ROS/MED HX  ENT/Pulmonary:    (-) tobacco use and sleep apnea   Neurologic:    (-) no seizures and no CVA   Cardiovascular:    (-) hypertension   METS/Exercise Tolerance:     Hematologic:       Musculoskeletal:       GI/Hepatic:     (+) hiatal hernia,  (-) GERD and liver disease   Renal/Genitourinary:    (-) renal disease   Endo:     (+) Obesity,  (-) Type II DM and thyroid disease   Psychiatric/Substance Use:     (+) psychiatric history anxiety and depression Recreational drug usage: Cocaine.    Infectious Disease:       Malignancy:       Other:            Physical Exam    Airway        Mallampati: II   TM distance: > 3 FB   Neck ROM: full   Mouth opening: > 3 cm    Respiratory Devices and Support         Dental       (+) Modest Abnormalities - crowns, retainers, 1 or 2 missing teeth      Cardiovascular   cardiovascular exam  normal          Pulmonary   pulmonary exam normal                OUTSIDE LABS:  CBC:   Lab Results   Component Value Date    WBC 11.8 (H) 06/07/2023    WBC 10.4 03/27/2023    HGB 8.2 (L) 06/07/2023    HGB 8.1 (L) 03/27/2023    HCT 28.5 (L) 06/07/2023    HCT 29.0 (L) 03/27/2023     06/07/2023     (H) 03/27/2023     BMP:   Lab Results   Component Value Date     03/27/2023    POTASSIUM 3.8 03/27/2023    CHLORIDE 104 03/27/2023    CO2 23 03/27/2023    BUN 8.8 03/27/2023    CR 0.83 03/27/2023    GLC 86 03/27/2023     COAGS: No results found for: PTT, INR, FIBR  POC: No results found for: BGM, HCG, HCGS  HEPATIC:   Lab Results   Component Value Date    ALBUMIN 4.3 03/27/2023    PROTTOTAL 7.9 03/27/2023    ALT 10 03/27/2023    AST 22 03/27/2023    ALKPHOS 115 (H) 03/27/2023    BILITOTAL 0.4 03/27/2023     OTHER:   Lab Results   Component Value Date    A1C 5.8 (H) 03/27/2023    GARRICK 9.2 03/27/2023    TSH 1.53 03/27/2023       Anesthesia Plan    ASA Status:  2   NPO Status:  NPO Appropriate    Anesthesia Type: MAC.     - Reason for MAC: straight local not clinically adequate              Consents    Anesthesia Plan(s) and associated risks, benefits, and realistic alternatives discussed. Questions answered and patient/representative(s) expressed understanding.    - Discussed:     - Discussed with:  Patient         Postoperative Care    Pain management: Multi-modal analgesia.   PONV prophylaxis: Ondansetron (or other 5HT-3)     Comments:                Nahid Dhillon MD

## 2023-06-20 NOTE — INTERVAL H&P NOTE
"I have reviewed the surgical (or preoperative) H&P that is linked to this encounter, and examined the patient. There are no significant changes    Clinical Conditions Present on Arrival:  Clinically Significant Risk Factors Present on Admission                  # Obesity: Estimated body mass index is 36.61 kg/m  as calculated from the following:    Height as of this encounter: 1.651 m (5' 5\").    Weight as of this encounter: 99.8 kg (220 lb).       "

## 2023-06-21 DIAGNOSIS — K44.9 HIATAL HERNIA: Primary | ICD-10-CM

## 2023-06-21 DIAGNOSIS — K21.00 GASTROESOPHAGEAL REFLUX DISEASE WITH ESOPHAGITIS WITHOUT HEMORRHAGE: ICD-10-CM

## 2023-06-21 LAB
PATH REPORT.COMMENTS IMP SPEC: NORMAL
PATH REPORT.COMMENTS IMP SPEC: NORMAL
PATH REPORT.FINAL DX SPEC: NORMAL
PATH REPORT.GROSS SPEC: NORMAL
PATH REPORT.MICROSCOPIC SPEC OTHER STN: NORMAL
PATH REPORT.RELEVANT HX SPEC: NORMAL
PHOTO IMAGE: NORMAL

## 2023-06-25 ENCOUNTER — PATIENT OUTREACH (OUTPATIENT)
Dept: ONCOLOGY | Facility: CLINIC | Age: 43
End: 2023-06-25
Payer: COMMERCIAL

## 2023-06-25 DIAGNOSIS — K44.9 HIATAL HERNIA: Primary | ICD-10-CM

## 2023-06-25 NOTE — PROGRESS NOTES
Review of Thoracic Surgery referral fr N GI team for pt with hiatal hernia.      Hx hiatal hernia, GERD, recent EGD & cscope at [facility] 6/20/23 below. Will need pre consult CT chest w/o & next available Thoracic Surgery consult. Will schedule per pt preference.       Smith Camargo RN  Oncology Nurse Navigator  [facility]  [phone]

## 2023-07-05 NOTE — TELEPHONE ENCOUNTER
RECORDS STATUS - ALL OTHER DIAGNOSIS      RECORDS RECEIVED FROM: [facility]   DATE RECEIVED: 7/5   NOTES STATUS DETAILS   OFFICE NOTE from referring provider Danny Levine MD in [facility]   DISCHARGE SUMMARY from hospital [facility] 6/20/23   OPERATIVE REPORT Epic/CE - EGD Epic  6/10/23: Colonoscopy    [facility]  11/17/17: EGD   MEDICATION LIST [facility]    LABS     ANYTHING RELATED TO DIAGNOSIS Epic 6/7/23   IMAGING (NEED IMAGES & REPORT)     XR PACS 3/15/23: Epic

## 2023-07-07 ENCOUNTER — ANCILLARY PROCEDURE (OUTPATIENT)
Dept: CT IMAGING | Facility: CLINIC | Age: 43
End: 2023-07-07
Attending: CLINICAL NURSE SPECIALIST
Payer: COMMERCIAL

## 2023-07-07 DIAGNOSIS — K44.9 HIATAL HERNIA: ICD-10-CM

## 2023-07-07 PROCEDURE — 71250 CT THORAX DX C-: CPT

## 2023-07-10 ENCOUNTER — PRE VISIT (OUTPATIENT)
Dept: ONCOLOGY | Facility: CLINIC | Age: 43
End: 2023-07-10
Payer: COMMERCIAL

## 2023-07-17 ENCOUNTER — PREP FOR PROCEDURE (OUTPATIENT)
Dept: SURGERY | Facility: CLINIC | Age: 43
End: 2023-07-17

## 2023-07-17 ENCOUNTER — ONCOLOGY VISIT (OUTPATIENT)
Dept: ONCOLOGY | Facility: CLINIC | Age: 43
End: 2023-07-17
Attending: INTERNAL MEDICINE
Payer: COMMERCIAL

## 2023-07-17 VITALS
HEIGHT: 66 IN | OXYGEN SATURATION: 99 % | DIASTOLIC BLOOD PRESSURE: 79 MMHG | TEMPERATURE: 98.2 F | WEIGHT: 230.2 LBS | HEART RATE: 70 BPM | SYSTOLIC BLOOD PRESSURE: 120 MMHG | BODY MASS INDEX: 37 KG/M2 | RESPIRATION RATE: 18 BRPM

## 2023-07-17 DIAGNOSIS — K21.00 HIATAL HERNIA WITH GASTROESOPHAGEAL REFLUX DISEASE AND ESOPHAGITIS: Primary | ICD-10-CM

## 2023-07-17 DIAGNOSIS — K44.9 HIATAL HERNIA WITH GASTROESOPHAGEAL REFLUX DISEASE AND ESOPHAGITIS: Primary | ICD-10-CM

## 2023-07-17 PROCEDURE — 99205 OFFICE O/P NEW HI 60 MIN: CPT | Performed by: THORACIC SURGERY (CARDIOTHORACIC VASCULAR SURGERY)

## 2023-07-17 PROCEDURE — G0463 HOSPITAL OUTPT CLINIC VISIT: HCPCS | Performed by: THORACIC SURGERY (CARDIOTHORACIC VASCULAR SURGERY)

## 2023-07-17 ASSESSMENT — PAIN SCALES - GENERAL: PAINLEVEL: MILD PAIN (3)

## 2023-07-17 NOTE — PROGRESS NOTES
THORACIC SURGERY - NEW PATIENT OFFICE VISIT      Dear VERONIKA Keith,    I saw Huy Coles at Dr. Almodovar s request in consultation for the evaluation and treatment of a hiatal hernia.     HPI  Huy Coles is a 42 year old female with long-standing acid reflux in the setting of a hiatal hernia. She first had an upper endoscopy 2006 or so and was told about the hernia and had mild esophagitis. She had been taking Dexilant, which controlled her symptoms, but had to stop taking it about 10 years ago. She takes omeprazole twice daily with TUMS (minimal) and pepcid (3 or 4 times weekly). She has heartburn, regurgitation, vomiting, reflux, cough and chest discomfort. She wakes up choking/vomiting at night. She avoids coffee and chocolate. She drinks lots of soda. She has not lost weight over the 20 years.  She walk two blocks without stopping or climb a flight of stairs. She has bowel movements 3-4 times weekly.     Previsit Tests   CT chest 7/7/2023:    Large hiatal hernia    EGD 6/20/2023:  5 cm hiatal hernia; Hill Grade IV gastroesophageal flap valve    EGD 10/7/2020: 4 cm hiatal hernia ,mild esophagitis    PMH  Reviewed, as below    Past Medical History:   Diagnosis Date     Depressive disorder 1999     Gastroesophageal reflux disease with esophagitis         PSH  Reviewed, as below    Past Surgical History:   Procedure Laterality Date     CHOLECYSTECTOMY, laparoscopic  2006     COLONOSCOPY N/A 6/20/2023    Procedure: Colonoscopy;  Surgeon: Danny Almodovar MD;  Location:  GI     ENT SURGERY  1998    Tonsills and adenoids removed, sinus surgery     ESOPHAGOSCOPY, GASTROSCOPY, DUODENOSCOPY (EGD), COMBINED N/A 6/20/2023    Procedure: Esophagoscopy, gastroscopy, duodenoscopy (EGD), combined;  Surgeon: Danny Almodovar MD;  Location:  GI     ORTHOPEDIC SURGERY  2020        ETOH:  Rare  TOB:  Never smoker  Other drugs: None currently    Physical examination  Pulse 70   Temp 98.2  F (36.8  C) (Oral)   Resp 18   Ht 1.67 m  "(5' 5.75\")   Wt 104.4 kg (230 lb 3.2 oz)   SpO2 99%   BMI 37.44 kg/m       From a personal perspective, she lives with her . She has a son who lives with his father. She is a .     IMPRESSION  (K44.9,  K21.00) Hiatal hernia with gastroesophageal reflux disease and esophagitis  (primary encounter diagnosis)       This person is a 42 year old female with a symptomatic giant hiatal hernia. She is not currently a good surgical candidate due to her BMI, but can become one with weight loss.    PLAN  I spent 60 min on the date of the encounter in chart review, patient visit, review of tests, documentation and/or discussion with other providers about the issues documented above. I reviewed the plan as follows:    I discussed that I recommend weight loss with eventual hiatal hernia repair or for her to be seen by Dr. Brown for weight loss management and consideration of hiatal hernia repair with or without bariatric operation. She would prefer to see weight loss management and have the hiatal hernia repair with fundoplication.    Procedure:  Robot-assisted hiatal hernia repair, fundoplication, possible gastroplasty, possible gastrostomy    The risks include, but are not limited to the following.  There is a risk of injury to the stomach, esophagus or abdominal contents.  There is a risk of injury to the vagus nerves, which could result in functional emptying issues for the stomach.  There is a risk of problems with the wrap, which can include difficulty swallowing, persistent reflux and pain.  Coughing or retching/vomiting in the weeks after surgery can result in breakdown of the repair.  If the esophagus does not reach the abdominal cavity, a \"new\" esophagus will be made out of the top of the stomach.  This gastroplasty can dilate over time, causing new swallowing difficulties, and continues to produce acid.  The wrap may loosen over time and need to be revised with a new operation.  The lung cavities " may be entered and tubes placed to evacuate air and fluid.   Finally, there is a risk of death.       I have asked her to lose at least 20 pounds prior to considering her for surgery.      Necessary Preop Tests & Appointments: Preoperative assessment clinic; weight loss management clinic    Regional Anesthesia Plan: None    Anticoagulation Plan: Prophylactic Lovenox      I appreciate the opportunity to participate in the care of your patient and will keep you updated.      Sincerely,        Lew Anderson MD

## 2023-07-17 NOTE — PROGRESS NOTES
"Oncology Rooming Note    July 17, 2023 2:26 PM   Huy Coles is a 42 year old female who presents for:    Chief Complaint   Patient presents with     Oncology Clinic Visit     Initial Vitals: /79 (BP Location: Left arm, Patient Position: Sitting, Cuff Size: Adult Large)   Pulse 70   Temp 98.2  F (36.8  C) (Oral)   Resp 18   Ht 1.67 m (5' 5.75\")   Wt 104.4 kg (230 lb 3.2 oz)   SpO2 99%   BMI 37.44 kg/m   Estimated body mass index is 37.44 kg/m  as calculated from the following:    Height as of this encounter: 1.67 m (5' 5.75\").    Weight as of this encounter: 104.4 kg (230 lb 3.2 oz). Body surface area is 2.2 meters squared.  Mild Pain (3) Comment: Data Unavailable   No LMP recorded. Patient is perimenopausal.  Allergies reviewed: Yes  Medications reviewed: Yes    Medications: Medication refills not needed today.  Pharmacy name entered into [facility]:    [facility] - [place], [place] - [address] [facility] - [place], [place] - [address] [place] [address].    Clinical concerns: no    Aleksandra Selby CMA              "

## 2023-07-17 NOTE — LETTER
7/17/2023         RE: Huy Coles  [address]  [place] [address]        Dear Colleague,    Thank you for referring your patient, Huy Coles, to the [facility]. Please see a copy of my visit note below.    THORACIC SURGERY - NEW PATIENT OFFICE VISIT      Dear VERONIKA Keith,    I saw Huy Coles at Dr. Almodovar s request in consultation for the evaluation and treatment of a hiatal hernia.     HPI  Huy Coles is a 42 year old female with long-standing acid reflux in the setting of a hiatal hernia. She first had an upper endoscopy 2006 or so and was told about the hernia and had mild esophagitis. She had been taking Dexilant, which controlled her symptoms, but had to stop taking it about 10 years ago. She takes omeprazole twice daily with TUMS (minimal) and pepcid (3 or 4 times weekly). She has heartburn, regurgitation, vomiting, reflux, cough and chest discomfort. She wakes up choking/vomiting at night. She avoids coffee and chocolate. She drinks lots of soda. She has not lost weight over the 20 years.  She walk two blocks without stopping or climb a flight of stairs. She has bowel movements 3-4 times weekly.     Previsit Tests   CT chest 7/7/2023:    Large hiatal hernia    EGD 6/20/2023:  5 cm hiatal hernia; Hill Grade IV gastroesophageal flap valve    EGD 10/7/2020: 4 cm hiatal hernia ,mild esophagitis    PMH  Reviewed, as below    Past Medical History:   Diagnosis Date     Depressive disorder 1999     Gastroesophageal reflux disease with esophagitis         PSH  Reviewed, as below    Past Surgical History:   Procedure Laterality Date     CHOLECYSTECTOMY, laparoscopic  2006     COLONOSCOPY N/A 6/20/2023    Procedure: Colonoscopy;  Surgeon: Danny Almodovar MD;  Location:  GI     ENT SURGERY  1998    Tonsills and adenoids removed, sinus surgery     ESOPHAGOSCOPY, GASTROSCOPY, DUODENOSCOPY (EGD), COMBINED N/A 6/20/2023    Procedure: Esophagoscopy, gastroscopy,  "duodenoscopy (EGD), combined;  Surgeon: Danny Almodovar MD;  Location:  GI     ORTHOPEDIC SURGERY  2020        ETOH:  Rare  TOB:  Never smoker  Other drugs: None currently    Physical examination  Pulse 70   Temp 98.2  F (36.8  C) (Oral)   Resp 18   Ht 1.67 m (5' 5.75\")   Wt 104.4 kg (230 lb 3.2 oz)   SpO2 99%   BMI 37.44 kg/m       From a personal perspective, she lives with her . She has a son who lives with his father. She is a .     IMPRESSION  (K44.9,  K21.00) Hiatal hernia with gastroesophageal reflux disease and esophagitis  (primary encounter diagnosis)       This person is a 42 year old female with a symptomatic giant hiatal hernia. She is not currently a good surgical candidate due to her BMI, but can become one with weight loss.    PLAN  I spent 60 min on the date of the encounter in chart review, patient visit, review of tests, documentation and/or discussion with other providers about the issues documented above. I reviewed the plan as follows:    I discussed that I recommend weight loss with eventual hiatal hernia repair or for her to be seen by Dr. Brown for weight loss management and consideration of hiatal hernia repair with or without bariatric operation. She would prefer to see weight loss management and have the hiatal hernia repair with fundoplication.    Procedure:  Robot-assisted hiatal hernia repair, fundoplication, possible gastroplasty, possible gastrostomy    The risks include, but are not limited to the following.  There is a risk of injury to the stomach, esophagus or abdominal contents.  There is a risk of injury to the vagus nerves, which could result in functional emptying issues for the stomach.  There is a risk of problems with the wrap, which can include difficulty swallowing, persistent reflux and pain.  Coughing or retching/vomiting in the weeks after surgery can result in breakdown of the repair.  If the esophagus does not reach the abdominal cavity, " "a \"new\" esophagus will be made out of the top of the stomach.  This gastroplasty can dilate over time, causing new swallowing difficulties, and continues to produce acid.  The wrap may loosen over time and need to be revised with a new operation.  The lung cavities may be entered and tubes placed to evacuate air and fluid.   Finally, there is a risk of death.       I have asked her to lose at least 20 pounds prior to considering her for surgery.      Necessary Preop Tests & Appointments: Preoperative assessment clinic; weight loss management clinic    Regional Anesthesia Plan: None    Anticoagulation Plan: Prophylactic Lovenox      I appreciate the opportunity to participate in the care of your patient and will keep you updated.      Sincerely,        Lew Anderson MD      Oncology Rooming Note    July 17, 2023 2:26 PM   Huy Coles is a 42 year old female who presents for:    Chief Complaint   Patient presents with     Oncology Clinic Visit     Initial Vitals: /79 (BP Location: Left arm, Patient Position: Sitting, Cuff Size: Adult Large)   Pulse 70   Temp 98.2  F (36.8  C) (Oral)   Resp 18   Ht 1.67 m (5' 5.75\")   Wt 104.4 kg (230 lb 3.2 oz)   SpO2 99%   BMI 37.44 kg/m   Estimated body mass index is 37.44 kg/m  as calculated from the following:    Height as of this encounter: 1.67 m (5' 5.75\").    Weight as of this encounter: 104.4 kg (230 lb 3.2 oz). Body surface area is 2.2 meters squared.  Mild Pain (3) Comment: Data Unavailable   No LMP recorded. Patient is perimenopausal.  Allergies reviewed: Yes  Medications reviewed: Yes    Medications: Medication refills not needed today.  Pharmacy name entered into [employer]:    [facility] - [place], [place] - [address] [facility] - [place], [place] - [address] [place] [address].    Clinical concerns: no    Aleksandra Selby [facility]                  Again, thank you for allowing me to participate in the care of your patient.  "       Sincerely,        Lew Anderson MD

## 2023-07-18 ENCOUNTER — LAB (OUTPATIENT)
Dept: LAB | Facility: CLINIC | Age: 43
End: 2023-07-18
Payer: COMMERCIAL

## 2023-07-18 DIAGNOSIS — E55.9 VITAMIN D DEFICIENCY: ICD-10-CM

## 2023-07-18 DIAGNOSIS — D50.9 IRON DEFICIENCY ANEMIA, UNSPECIFIED IRON DEFICIENCY ANEMIA TYPE: ICD-10-CM

## 2023-07-18 LAB
ANION GAP SERPL CALCULATED.3IONS-SCNC: 11 MMOL/L (ref 7–15)
BASOPHILS # BLD AUTO: 0.1 10E3/UL (ref 0–0.2)
BASOPHILS NFR BLD AUTO: 1 %
BUN SERPL-MCNC: 13.2 MG/DL (ref 6–20)
CALCIUM SERPL-MCNC: 9.1 MG/DL (ref 8.6–10)
CHLORIDE SERPL-SCNC: 104 MMOL/L (ref 98–107)
CREAT SERPL-MCNC: 0.92 MG/DL (ref 0.51–0.95)
DEPRECATED HCO3 PLAS-SCNC: 23 MMOL/L (ref 22–29)
EOSINOPHIL # BLD AUTO: 0.2 10E3/UL (ref 0–0.7)
EOSINOPHIL NFR BLD AUTO: 2 %
ERYTHROCYTE [DISTWIDTH] IN BLOOD BY AUTOMATED COUNT: 18.1 % (ref 10–15)
FERRITIN SERPL-MCNC: 11 NG/ML (ref 6–175)
FOLATE SERPL-MCNC: 11.6 NG/ML (ref 4.6–34.8)
GFR SERPL CREATININE-BSD FRML MDRD: 80 ML/MIN/1.73M2
GLUCOSE SERPL-MCNC: 93 MG/DL (ref 70–99)
HCT VFR BLD AUTO: 30.7 % (ref 35–47)
HGB BLD-MCNC: 8.6 G/DL (ref 11.7–15.7)
IMM GRANULOCYTES # BLD: 0 10E3/UL
IMM GRANULOCYTES NFR BLD: 0 %
IRON BINDING CAPACITY (ROCHE): 385 UG/DL (ref 240–430)
IRON SATN MFR SERPL: 4 % (ref 15–46)
IRON SERPL-MCNC: 15 UG/DL (ref 37–145)
LDH SERPL L TO P-CCNC: 217 U/L (ref 0–250)
LYMPHOCYTES # BLD AUTO: 3.4 10E3/UL (ref 0.8–5.3)
LYMPHOCYTES NFR BLD AUTO: 34 %
MCH RBC QN AUTO: 19.7 PG (ref 26.5–33)
MCHC RBC AUTO-ENTMCNC: 28 G/DL (ref 31.5–36.5)
MCV RBC AUTO: 70 FL (ref 78–100)
MONOCYTES # BLD AUTO: 0.6 10E3/UL (ref 0–1.3)
MONOCYTES NFR BLD AUTO: 5 %
NEUTROPHILS # BLD AUTO: 5.9 10E3/UL (ref 1.6–8.3)
NEUTROPHILS NFR BLD AUTO: 58 %
NRBC # BLD AUTO: 0 10E3/UL
NRBC BLD AUTO-RTO: 0 /100
PLATELET # BLD AUTO: 543 10E3/UL (ref 150–450)
POTASSIUM SERPL-SCNC: 4 MMOL/L (ref 3.4–5.3)
RBC # BLD AUTO: 4.37 10E6/UL (ref 3.8–5.2)
RETICS # AUTO: 0.04 10E6/UL (ref 0.03–0.1)
RETICS/RBC NFR AUTO: 0.9 % (ref 0.5–2)
SODIUM SERPL-SCNC: 138 MMOL/L (ref 136–145)
VIT B12 SERPL-MCNC: 410 PG/ML (ref 232–1245)
WBC # BLD AUTO: 10.2 10E3/UL (ref 4–11)

## 2023-07-18 PROCEDURE — 84155 ASSAY OF PROTEIN SERUM: CPT

## 2023-07-18 PROCEDURE — 99207 BLOOD MORPHOLOGY PATHOLOGIST REVIEW: CPT | Performed by: PATHOLOGY

## 2023-07-18 PROCEDURE — 82306 VITAMIN D 25 HYDROXY: CPT

## 2023-07-18 PROCEDURE — 82728 ASSAY OF FERRITIN: CPT

## 2023-07-18 PROCEDURE — 80048 BASIC METABOLIC PNL TOTAL CA: CPT

## 2023-07-18 PROCEDURE — 84165 PROTEIN E-PHORESIS SERUM: CPT

## 2023-07-18 PROCEDURE — 85045 AUTOMATED RETICULOCYTE COUNT: CPT

## 2023-07-18 PROCEDURE — 85025 COMPLETE CBC W/AUTO DIFF WBC: CPT

## 2023-07-18 PROCEDURE — 82607 VITAMIN B-12: CPT

## 2023-07-18 PROCEDURE — 83540 ASSAY OF IRON: CPT

## 2023-07-18 PROCEDURE — 83090 ASSAY OF HOMOCYSTEINE: CPT

## 2023-07-18 PROCEDURE — 83615 LACTATE (LD) (LDH) ENZYME: CPT

## 2023-07-18 PROCEDURE — 83550 IRON BINDING TEST: CPT

## 2023-07-18 PROCEDURE — 36415 COLL VENOUS BLD VENIPUNCTURE: CPT

## 2023-07-18 PROCEDURE — 82746 ASSAY OF FOLIC ACID SERUM: CPT

## 2023-07-18 PROCEDURE — 83921 ORGANIC ACID SINGLE QUANT: CPT

## 2023-07-19 LAB
ALBUMIN SERPL ELPH-MCNC: 3.9 G/DL (ref 3.7–5.1)
ALPHA1 GLOB SERPL ELPH-MCNC: 0.3 G/DL (ref 0.2–0.4)
ALPHA2 GLOB SERPL ELPH-MCNC: 0.8 G/DL (ref 0.5–0.9)
B-GLOBULIN SERPL ELPH-MCNC: 0.9 G/DL (ref 0.6–1)
DEPRECATED CALCIDIOL+CALCIFEROL SERPL-MC: 30 UG/L (ref 20–75)
GAMMA GLOB SERPL ELPH-MCNC: 1.1 G/DL (ref 0.7–1.6)
HCYS SERPL-SCNC: 7.7 UMOL/L (ref 4–12)
M PROTEIN SERPL ELPH-MCNC: 0 G/DL
PATH REPORT.COMMENTS IMP SPEC: NORMAL
PATH REPORT.FINAL DX SPEC: NORMAL
PATH REPORT.MICROSCOPIC SPEC OTHER STN: NORMAL
PATH REPORT.MICROSCOPIC SPEC OTHER STN: NORMAL
PROT PATTERN SERPL ELPH-IMP: NORMAL
TOTAL PROTEIN SERUM FOR ELP: 7 G/DL (ref 6.4–8.3)

## 2023-07-24 ENCOUNTER — OFFICE VISIT (OUTPATIENT)
Dept: FAMILY MEDICINE | Facility: CLINIC | Age: 43
End: 2023-07-24
Attending: NURSE PRACTITIONER
Payer: COMMERCIAL

## 2023-07-24 VITALS
DIASTOLIC BLOOD PRESSURE: 84 MMHG | WEIGHT: 228.6 LBS | HEART RATE: 94 BPM | RESPIRATION RATE: 16 BRPM | TEMPERATURE: 97.1 F | HEIGHT: 66 IN | OXYGEN SATURATION: 98 % | BODY MASS INDEX: 36.74 KG/M2 | SYSTOLIC BLOOD PRESSURE: 128 MMHG

## 2023-07-24 DIAGNOSIS — E04.1 THYROID NODULE: ICD-10-CM

## 2023-07-24 DIAGNOSIS — E66.01 CLASS 2 SEVERE OBESITY DUE TO EXCESS CALORIES WITH SERIOUS COMORBIDITY AND BODY MASS INDEX (BMI) OF 36.0 TO 36.9 IN ADULT (H): Primary | ICD-10-CM

## 2023-07-24 DIAGNOSIS — E66.812 CLASS 2 SEVERE OBESITY DUE TO EXCESS CALORIES WITH SERIOUS COMORBIDITY AND BODY MASS INDEX (BMI) OF 36.0 TO 36.9 IN ADULT (H): Primary | ICD-10-CM

## 2023-07-24 DIAGNOSIS — D50.9 IRON DEFICIENCY ANEMIA, UNSPECIFIED IRON DEFICIENCY ANEMIA TYPE: ICD-10-CM

## 2023-07-24 DIAGNOSIS — K20.90 ESOPHAGITIS DETERMINED BY ENDOSCOPY: ICD-10-CM

## 2023-07-24 PROCEDURE — 99214 OFFICE O/P EST MOD 30 MIN: CPT | Performed by: NURSE PRACTITIONER

## 2023-07-24 ASSESSMENT — PAIN SCALES - GENERAL: PAINLEVEL: NO PAIN (0)

## 2023-07-24 NOTE — PROGRESS NOTES
"  {PROVIDER CHARTING PREFERENCE:135081}    Lola Son is a 42 year old, presenting for the following health issues:  Follow Up      7/24/2023     3:05 PM   Additional Questions   Roomed by Cathy Elmore   Accompanied by Self     History of Present Illness       Reason for visit:  Lab recheck for iron levels    She eats 0-1 servings of fruits and vegetables daily.She consumes 5 sweetened beverage(s) daily.She exercises with enough effort to increase her heart rate 9 or less minutes per day.  She exercises with enough effort to increase her heart rate 3 or less days per week.   She is taking medications regularly.       {SUPERLIST (Optional):802864}  {additonal problems for provider to add (Optional):309934}      Review of Systems   {ROS COMP (Optional):187822}      Objective    /84 (BP Location: Right arm, Patient Position: Sitting, Cuff Size: Adult Regular)   Pulse 94   Temp 97.1  F (36.2  C) (Tympanic)   Resp 16   Ht 1.67 m (5' 5.75\")   Wt 103.7 kg (228 lb 9.6 oz)   LMP  (LMP Unknown)   SpO2 98%   Breastfeeding No   BMI 37.18 kg/m    Body mass index is 37.18 kg/m .  Physical Exam   {Exam List (Optional):084038}    {Diagnostic Test Results (Optional):643513}    {AMBULATORY ATTESTATION (Optional):172118}            Answers submitted by the patient for this visit:  General Questionnaire (Submitted on 7/23/2023)  Chief Complaint: Chronic problems general questions HPI Form  What is the reason for your visit today? : Lab recheck for iron levels  How many servings of fruits and vegetables do you eat daily?: 0-1  On average, how many sweetened beverages do you drink each day (Examples: soda, juice, sweet tea, etc.  Do NOT count diet or artificially sweetened beverages)?: 5  How many minutes a day do you exercise enough to make your heart beat faster?: 9 or less  How many days a week do you exercise enough to make your heart beat faster?: 3 or less  How many days per week do you miss taking your " medication?: 0

## 2023-07-24 NOTE — PROGRESS NOTES
"  Assessment & Plan     Esophagitis determined by endoscopy  Iron deficiency anemia, unspecified iron deficiency anemia type  Will plan Iron infusion d/t difficulty tolerating oral     Class 2 severe obesity due to excess calories with serious comorbidity and body mass index (BMI) of 36.0 to 36.9 in adult (H)    Estimated body mass index is 37.18 kg/m  as calculated from the following:    Height as of this encounter: 1.67 m (5' 5.75\").    Weight as of this encounter: 103.7 kg (228 lb 9.6 oz).     discussed treatment options w/phentermine, topiramate, natrexone, wellbutrin and glp-1 therapy; reviewed side effects of all medications; discussed and lifestyle changes including starting an exercise/activity program 30 minutes daily, daily caloric/cho/protein; meal tracking;    - reduce caloric intake 1800-2K per day; cho 100-150 gms per meal; protein 25-30 gm per meal  - check nutritional status; A1c, Lipid, Tsh, Vit D, B12    - Phentermine-Topiramate 7.5-46 MG CP24  Dispense: 30 capsule; Refill: 1  - Phentermine-Topiramate 11.25-69 MG CP24  Dispense: 30 capsule; Refill: 1    Thyroid nodule  - US Thyroid               BMI:   Estimated body mass index is 37.18 kg/m  as calculated from the following:    Height as of this encounter: 1.67 m (5' 5.75\").    Weight as of this encounter: 103.7 kg (228 lb 9.6 oz).           MATILDA Davis CNP  [facility]    Lola Son is a 42 year old, presenting for the following health issues:  Follow Up      7/24/2023     3:05 PM   Additional Questions   Roomed by Cathy Elmore   Accompanied by Self     History of Present Illness       Reason for visit:  Lab recheck for iron levels    She eats 0-1 servings of fruits and vegetables daily.She consumes 5 sweetened beverage(s) daily.She exercises with enough effort to increase her heart rate 9 or less minutes per day.  She exercises with enough effort to increase her heart rate 3 or less days per week.   She " "is taking medications regularly.       - folllow H. Hernia and egd; anemia    H. Hernia:  pending surgery needs weight loss BMI 37 needs 20 lb weight loss prior to surgery    Anemia : unable to tolerate oral iron supplements due gerd/h hernia     IMPRESSION:   1.  Moderate hiatal hernia.  2.  Multifocal areas of tree-in-bud, clustered nodularity seen in the lungs, predominantly the left upper and lower lobes. Findings suggestive of multifocal bronchiolitis. A few scattered pulmonary nodules measuring up to 6 mm seen. Recommend 3-6 month   CT chest follow-up exam.      Thyroid nodule  2020 2. Stable 1.2 cm partially calcified thyroid nodule on the right, similar to 2016.   Subcentimeter calcifications are seen in the thyroid gland. 6 mm nodule is noted within the right lobe of the thyroid gland.           Review of Systems         Objective    /84 (BP Location: Right arm, Patient Position: Sitting, Cuff Size: Adult Regular)   Pulse 94   Temp 97.1  F (36.2  C) (Tympanic)   Resp 16   Ht 1.67 m (5' 5.75\")   Wt 103.7 kg (228 lb 9.6 oz)   LMP  (LMP Unknown)   SpO2 98%   Breastfeeding No   BMI 37.18 kg/m    Body mass index is 37.18 kg/m .  Physical Exam                         "

## 2023-07-25 ENCOUNTER — TELEPHONE (OUTPATIENT)
Dept: FAMILY MEDICINE | Facility: CLINIC | Age: 43
End: 2023-07-25
Payer: COMMERCIAL

## 2023-07-25 DIAGNOSIS — E66.01 CLASS 2 SEVERE OBESITY DUE TO EXCESS CALORIES WITH SERIOUS COMORBIDITY AND BODY MASS INDEX (BMI) OF 36.0 TO 36.9 IN ADULT (H): Primary | ICD-10-CM

## 2023-07-25 DIAGNOSIS — E66.812 CLASS 2 SEVERE OBESITY DUE TO EXCESS CALORIES WITH SERIOUS COMORBIDITY AND BODY MASS INDEX (BMI) OF 36.0 TO 36.9 IN ADULT (H): Primary | ICD-10-CM

## 2023-07-25 NOTE — TELEPHONE ENCOUNTER
Plan does not cover Phentermine-Topiramate 7.5-46 MG CP24 .  Please start a new PA.    Reason for denial:      Insurance plan: [employer]   Patient ID: [id number]

## 2023-07-26 ENCOUNTER — TELEPHONE (OUTPATIENT)
Dept: FAMILY MEDICINE | Facility: CLINIC | Age: 43
End: 2023-07-26

## 2023-07-26 ENCOUNTER — HOSPITAL ENCOUNTER (OUTPATIENT)
Dept: ULTRASOUND IMAGING | Facility: CLINIC | Age: 43
Discharge: HOME | End: 2023-07-26
Attending: NURSE PRACTITIONER | Admitting: NURSE PRACTITIONER
Payer: COMMERCIAL

## 2023-07-26 DIAGNOSIS — E04.1 THYROID NODULE: ICD-10-CM

## 2023-07-26 DIAGNOSIS — D50.9 IRON DEFICIENCY ANEMIA, UNSPECIFIED IRON DEFICIENCY ANEMIA TYPE: Primary | ICD-10-CM

## 2023-07-26 PROCEDURE — 76536 US EXAM OF HEAD AND NECK: CPT

## 2023-07-26 NOTE — TELEPHONE ENCOUNTER
General Call    Contacts         Type Contact Phone/Fax    07/26/2023 01:36 PM CDT Phone (Incoming) Huy Coles (Self) [phone] (H)          Reason for Call:  Iron Transfusion    What are your questions or concerns:  Huy still have not heard back about the iron transfusion     Date of last appointment with provider: 03/27/2023    Could we send this information to you in [place] or would you prefer to receive a phone call?:   No preference   Okay to leave a detailed message?: Yes at Cell number on file:    Telephone Information:   Mobile [phone]

## 2023-07-27 LAB — METHYLMALONATE SERPL-SCNC: 0.27 UMOL/L (ref 0–0.4)

## 2023-07-27 NOTE — TELEPHONE ENCOUNTER
Eduardo-Please advise on whether iron infusion is the plan for patient?    Thank you!  MARI AstudilloN, RN-[facility]

## 2023-08-01 PROBLEM — K20.90 ESOPHAGITIS DETERMINED BY ENDOSCOPY: Status: ACTIVE | Noted: 2023-08-01

## 2023-08-01 PROBLEM — D50.9 IRON DEFICIENCY ANEMIA, UNSPECIFIED IRON DEFICIENCY ANEMIA TYPE: Status: ACTIVE | Noted: 2023-08-01

## 2023-08-01 NOTE — TELEPHONE ENCOUNTER
Medication: Phentermine-Topiramate 7.5-46 MG- PLAN EXCLUSION     Per call to [employer], this medication is excluded from the pt's pharmacy benefit. No option for a pa.    Please close encounter when finished.    Thank you,  Central Prior Authorization Team  [phone]

## 2023-08-02 NOTE — TELEPHONE ENCOUNTER
Eduardo Keith --    SIN. Phentermine and Topiramate.     Writer just spoke with Huy regarding the prior auth not being an option.   Patient stated that you were going to place an order for phentermine and topiramate separately.   Close after you are done. Thank you.     Mandie Nava, MARIN RN  [facility]

## 2023-08-02 NOTE — RESULT ENCOUNTER NOTE
Results discussed directly with patient while patient was present. Any further details documented in the note.   MATILDA Davis CNP resp failure COPD exacerbation SOB COPD exacerbation

## 2023-08-02 NOTE — TELEPHONE ENCOUNTER
Pended new iron sucrose infusion plan under Judi Draper; routed for signature.    Julianne LENZ RN  [facility]

## 2023-08-03 ENCOUNTER — MYC MEDICAL ADVICE (OUTPATIENT)
Dept: FAMILY MEDICINE | Facility: CLINIC | Age: 43
End: 2023-08-03
Payer: COMMERCIAL

## 2023-08-03 RX ORDER — ALBUTEROL SULFATE 90 UG/1
1-2 AEROSOL, METERED RESPIRATORY (INHALATION)
Status: CANCELLED
Start: 2023-08-03

## 2023-08-03 RX ORDER — HEPARIN SODIUM,PORCINE 10 UNIT/ML
5-20 VIAL (ML) INTRAVENOUS DAILY PRN
Status: CANCELLED | OUTPATIENT
Start: 2023-08-03

## 2023-08-03 RX ORDER — DIPHENHYDRAMINE HYDROCHLORIDE 50 MG/ML
50 INJECTION INTRAMUSCULAR; INTRAVENOUS
Status: CANCELLED
Start: 2023-08-03

## 2023-08-03 RX ORDER — METHYLPREDNISOLONE SODIUM SUCCINATE 125 MG/2ML
125 INJECTION, POWDER, LYOPHILIZED, FOR SOLUTION INTRAMUSCULAR; INTRAVENOUS
Status: CANCELLED
Start: 2023-08-03

## 2023-08-03 RX ORDER — EPINEPHRINE 1 MG/ML
0.3 INJECTION, SOLUTION, CONCENTRATE INTRAVENOUS EVERY 5 MIN PRN
Status: CANCELLED | OUTPATIENT
Start: 2023-08-03

## 2023-08-03 RX ORDER — ALBUTEROL SULFATE 0.83 MG/ML
2.5 SOLUTION RESPIRATORY (INHALATION)
Status: CANCELLED | OUTPATIENT
Start: 2023-08-03

## 2023-08-03 RX ORDER — HEPARIN SODIUM (PORCINE) LOCK FLUSH IV SOLN 100 UNIT/ML 100 UNIT/ML
5 SOLUTION INTRAVENOUS
Status: CANCELLED | OUTPATIENT
Start: 2023-08-03

## 2023-08-03 RX ORDER — MEPERIDINE HYDROCHLORIDE 25 MG/ML
25 INJECTION INTRAMUSCULAR; INTRAVENOUS; SUBCUTANEOUS EVERY 30 MIN PRN
Status: CANCELLED | OUTPATIENT
Start: 2023-08-03

## 2023-08-03 NOTE — TELEPHONE ENCOUNTER
[employer] message sent to patient to expect call from infusion center to schedule.    Julianne LENZ RN  [facility]

## 2023-08-03 NOTE — TELEPHONE ENCOUNTER
I don't see anything pended (is it a smart set?) - not sure whether Eduardo got to this yesterday.   Judi Draper PA-C

## 2023-08-06 RX ORDER — TOPIRAMATE 25 MG/1
TABLET, FILM COATED ORAL
Qty: 282 TABLET | Refills: 0 | Status: SHIPPED | OUTPATIENT
Start: 2023-08-06 | End: 2024-07-11

## 2023-08-06 RX ORDER — PHENTERMINE HYDROCHLORIDE 37.5 MG/1
37.5 CAPSULE ORAL EVERY MORNING
Qty: 30 CAPSULE | Refills: 1 | Status: SHIPPED | OUTPATIENT
Start: 2023-08-06 | End: 2024-07-11

## 2023-08-21 ENCOUNTER — INFUSION THERAPY VISIT (OUTPATIENT)
Dept: INFUSION THERAPY | Facility: CLINIC | Age: 43
End: 2023-08-21
Attending: PHYSICIAN ASSISTANT
Payer: COMMERCIAL

## 2023-08-21 VITALS
TEMPERATURE: 98.4 F | RESPIRATION RATE: 16 BRPM | SYSTOLIC BLOOD PRESSURE: 118 MMHG | HEART RATE: 76 BPM | OXYGEN SATURATION: 99 % | DIASTOLIC BLOOD PRESSURE: 66 MMHG

## 2023-08-21 DIAGNOSIS — D50.9 IRON DEFICIENCY ANEMIA, UNSPECIFIED IRON DEFICIENCY ANEMIA TYPE: Primary | ICD-10-CM

## 2023-08-21 PROCEDURE — 96366 THER/PROPH/DIAG IV INF ADDON: CPT

## 2023-08-21 PROCEDURE — 250N000011 HC RX IP 250 OP 636: Mod: JZ | Performed by: PHYSICIAN ASSISTANT

## 2023-08-21 PROCEDURE — 96365 THER/PROPH/DIAG IV INF INIT: CPT

## 2023-08-21 PROCEDURE — 258N000003 HC RX IP 258 OP 636: Performed by: PHYSICIAN ASSISTANT

## 2023-08-21 RX ORDER — DIPHENHYDRAMINE HYDROCHLORIDE 50 MG/ML
50 INJECTION INTRAMUSCULAR; INTRAVENOUS
Status: DISCONTINUED | OUTPATIENT
Start: 2023-08-21 | End: 2023-08-21 | Stop reason: HOSPADM

## 2023-08-21 RX ORDER — EPINEPHRINE 1 MG/ML
0.3 INJECTION, SOLUTION INTRAMUSCULAR; SUBCUTANEOUS EVERY 5 MIN PRN
Status: CANCELLED | OUTPATIENT
Start: 2023-08-23

## 2023-08-21 RX ORDER — MEPERIDINE HYDROCHLORIDE 50 MG/ML
25 INJECTION INTRAMUSCULAR; INTRAVENOUS; SUBCUTANEOUS EVERY 30 MIN PRN
Status: DISCONTINUED | OUTPATIENT
Start: 2023-08-21 | End: 2023-08-21 | Stop reason: HOSPADM

## 2023-08-21 RX ORDER — ALBUTEROL SULFATE 0.83 MG/ML
2.5 SOLUTION RESPIRATORY (INHALATION)
Status: DISCONTINUED | OUTPATIENT
Start: 2023-08-21 | End: 2023-08-21 | Stop reason: HOSPADM

## 2023-08-21 RX ORDER — METHYLPREDNISOLONE SODIUM SUCCINATE 125 MG/2ML
125 INJECTION, POWDER, LYOPHILIZED, FOR SOLUTION INTRAMUSCULAR; INTRAVENOUS
Status: CANCELLED
Start: 2023-08-23

## 2023-08-21 RX ORDER — DIPHENHYDRAMINE HYDROCHLORIDE 50 MG/ML
50 INJECTION INTRAMUSCULAR; INTRAVENOUS
Status: CANCELLED
Start: 2023-08-23

## 2023-08-21 RX ORDER — MEPERIDINE HYDROCHLORIDE 50 MG/ML
25 INJECTION INTRAMUSCULAR; INTRAVENOUS; SUBCUTANEOUS EVERY 30 MIN PRN
Status: CANCELLED | OUTPATIENT
Start: 2023-08-23

## 2023-08-21 RX ORDER — METHYLPREDNISOLONE SODIUM SUCCINATE 125 MG/2ML
125 INJECTION, POWDER, LYOPHILIZED, FOR SOLUTION INTRAMUSCULAR; INTRAVENOUS
Status: DISCONTINUED | OUTPATIENT
Start: 2023-08-21 | End: 2023-08-21 | Stop reason: HOSPADM

## 2023-08-21 RX ORDER — ALBUTEROL SULFATE 90 UG/1
1-2 AEROSOL, METERED RESPIRATORY (INHALATION)
Status: CANCELLED
Start: 2023-08-23

## 2023-08-21 RX ORDER — HEPARIN SODIUM,PORCINE 10 UNIT/ML
5-20 VIAL (ML) INTRAVENOUS DAILY PRN
Status: CANCELLED | OUTPATIENT
Start: 2023-08-23

## 2023-08-21 RX ORDER — ALBUTEROL SULFATE 90 UG/1
1-2 AEROSOL, METERED RESPIRATORY (INHALATION)
Status: DISCONTINUED | OUTPATIENT
Start: 2023-08-21 | End: 2023-08-21 | Stop reason: HOSPADM

## 2023-08-21 RX ORDER — HEPARIN SODIUM (PORCINE) LOCK FLUSH IV SOLN 100 UNIT/ML 100 UNIT/ML
5 SOLUTION INTRAVENOUS
Status: CANCELLED | OUTPATIENT
Start: 2023-08-23

## 2023-08-21 RX ORDER — EPINEPHRINE 1 MG/ML
0.3 INJECTION, SOLUTION INTRAMUSCULAR; SUBCUTANEOUS EVERY 5 MIN PRN
Status: DISCONTINUED | OUTPATIENT
Start: 2023-08-21 | End: 2023-08-21 | Stop reason: HOSPADM

## 2023-08-21 RX ORDER — ALBUTEROL SULFATE 0.83 MG/ML
2.5 SOLUTION RESPIRATORY (INHALATION)
Status: CANCELLED | OUTPATIENT
Start: 2023-08-23

## 2023-08-21 RX ADMIN — SODIUM CHLORIDE 250 ML: 9 INJECTION, SOLUTION INTRAVENOUS at 14:08

## 2023-08-21 RX ADMIN — IRON SUCROSE 300 MG: 20 INJECTION, SOLUTION INTRAVENOUS at 14:38

## 2023-08-21 NOTE — PROGRESS NOTES
Infusion Nursing Note:  Huy Coles presents today for iron infusion.      Note: Pt states that her low iron has to do with her hiatal hernia. Verbal and written information given to pt. Pt expresses understanding      Intravenous Access:  Peripheral IV placed.    Treatment Conditions:  Results reviewed, labs MET treatment parameters, ok to proceed with treatment.      Post Infusion Assessment:  Patient tolerated infusion without incident.       Discharge Plan:   Patient discharged in stable condition accompanied by: self.      Miri VALLES RN

## 2023-08-23 ENCOUNTER — INFUSION THERAPY VISIT (OUTPATIENT)
Dept: INFUSION THERAPY | Facility: CLINIC | Age: 43
End: 2023-08-23
Attending: PHYSICIAN ASSISTANT
Payer: COMMERCIAL

## 2023-08-23 VITALS
HEART RATE: 95 BPM | SYSTOLIC BLOOD PRESSURE: 135 MMHG | TEMPERATURE: 97.7 F | OXYGEN SATURATION: 99 % | RESPIRATION RATE: 16 BRPM | DIASTOLIC BLOOD PRESSURE: 81 MMHG

## 2023-08-23 DIAGNOSIS — D50.9 IRON DEFICIENCY ANEMIA, UNSPECIFIED IRON DEFICIENCY ANEMIA TYPE: Primary | ICD-10-CM

## 2023-08-23 PROCEDURE — 250N000011 HC RX IP 250 OP 636: Mod: JZ | Performed by: PHYSICIAN ASSISTANT

## 2023-08-23 PROCEDURE — 258N000003 HC RX IP 258 OP 636: Performed by: PHYSICIAN ASSISTANT

## 2023-08-23 PROCEDURE — 96365 THER/PROPH/DIAG IV INF INIT: CPT

## 2023-08-23 PROCEDURE — 96366 THER/PROPH/DIAG IV INF ADDON: CPT

## 2023-08-23 RX ORDER — ALBUTEROL SULFATE 90 UG/1
1-2 AEROSOL, METERED RESPIRATORY (INHALATION)
Status: CANCELLED
Start: 2023-08-25

## 2023-08-23 RX ORDER — METHYLPREDNISOLONE SODIUM SUCCINATE 125 MG/2ML
125 INJECTION, POWDER, LYOPHILIZED, FOR SOLUTION INTRAMUSCULAR; INTRAVENOUS
Status: CANCELLED
Start: 2023-08-25

## 2023-08-23 RX ORDER — HEPARIN SODIUM (PORCINE) LOCK FLUSH IV SOLN 100 UNIT/ML 100 UNIT/ML
5 SOLUTION INTRAVENOUS
Status: CANCELLED | OUTPATIENT
Start: 2023-08-25

## 2023-08-23 RX ORDER — MEPERIDINE HYDROCHLORIDE 50 MG/ML
25 INJECTION INTRAMUSCULAR; INTRAVENOUS; SUBCUTANEOUS EVERY 30 MIN PRN
Status: DISCONTINUED | OUTPATIENT
Start: 2023-08-23 | End: 2023-08-23 | Stop reason: HOSPADM

## 2023-08-23 RX ORDER — EPINEPHRINE 1 MG/ML
0.3 INJECTION, SOLUTION INTRAMUSCULAR; SUBCUTANEOUS EVERY 5 MIN PRN
Status: CANCELLED | OUTPATIENT
Start: 2023-08-25

## 2023-08-23 RX ORDER — HEPARIN SODIUM,PORCINE 10 UNIT/ML
5-20 VIAL (ML) INTRAVENOUS DAILY PRN
Status: CANCELLED | OUTPATIENT
Start: 2023-08-25

## 2023-08-23 RX ORDER — ALBUTEROL SULFATE 0.83 MG/ML
2.5 SOLUTION RESPIRATORY (INHALATION)
Status: CANCELLED | OUTPATIENT
Start: 2023-08-25

## 2023-08-23 RX ORDER — ALBUTEROL SULFATE 0.83 MG/ML
2.5 SOLUTION RESPIRATORY (INHALATION)
Status: DISCONTINUED | OUTPATIENT
Start: 2023-08-23 | End: 2023-08-23 | Stop reason: HOSPADM

## 2023-08-23 RX ORDER — METHYLPREDNISOLONE SODIUM SUCCINATE 125 MG/2ML
125 INJECTION, POWDER, LYOPHILIZED, FOR SOLUTION INTRAMUSCULAR; INTRAVENOUS
Status: DISCONTINUED | OUTPATIENT
Start: 2023-08-23 | End: 2023-08-23 | Stop reason: HOSPADM

## 2023-08-23 RX ORDER — EPINEPHRINE 1 MG/ML
0.3 INJECTION, SOLUTION INTRAMUSCULAR; SUBCUTANEOUS EVERY 5 MIN PRN
Status: DISCONTINUED | OUTPATIENT
Start: 2023-08-23 | End: 2023-08-23 | Stop reason: HOSPADM

## 2023-08-23 RX ORDER — DIPHENHYDRAMINE HYDROCHLORIDE 50 MG/ML
50 INJECTION INTRAMUSCULAR; INTRAVENOUS
Status: DISCONTINUED | OUTPATIENT
Start: 2023-08-23 | End: 2023-08-23 | Stop reason: HOSPADM

## 2023-08-23 RX ORDER — MEPERIDINE HYDROCHLORIDE 50 MG/ML
25 INJECTION INTRAMUSCULAR; INTRAVENOUS; SUBCUTANEOUS EVERY 30 MIN PRN
Status: CANCELLED | OUTPATIENT
Start: 2023-08-25

## 2023-08-23 RX ORDER — ALBUTEROL SULFATE 90 UG/1
1-2 AEROSOL, METERED RESPIRATORY (INHALATION)
Status: DISCONTINUED | OUTPATIENT
Start: 2023-08-23 | End: 2023-08-23 | Stop reason: HOSPADM

## 2023-08-23 RX ORDER — DIPHENHYDRAMINE HYDROCHLORIDE 50 MG/ML
50 INJECTION INTRAMUSCULAR; INTRAVENOUS
Status: CANCELLED
Start: 2023-08-25

## 2023-08-23 RX ADMIN — IRON SUCROSE 300 MG: 20 INJECTION, SOLUTION INTRAVENOUS at 13:54

## 2023-08-23 RX ADMIN — SODIUM CHLORIDE 250 ML: 9 INJECTION, SOLUTION INTRAVENOUS at 13:54

## 2023-08-23 NOTE — PROGRESS NOTES
Infusion Nursing Note:  Huy Coles presents today for IV iron infusion.    Patient seen by provider today: No   present during visit today: Not Applicable.    Note: Tolerated iron infusion well.  Offers no complaints.      Intravenous Access:  Peripheral IV placed.    Treatment Conditions:  Not Applicable.      Post Infusion Assessment:  No evidence of extravasations.  Access discontinued per protocol.       Discharge Plan:   Patient and/or family verbalized understanding of discharge instructions and all questions answered.      Neisha Aguirre RN

## 2023-08-25 ENCOUNTER — INFUSION THERAPY VISIT (OUTPATIENT)
Dept: INFUSION THERAPY | Facility: CLINIC | Age: 43
End: 2023-08-25
Attending: PHYSICIAN ASSISTANT
Payer: COMMERCIAL

## 2023-08-25 VITALS
SYSTOLIC BLOOD PRESSURE: 117 MMHG | TEMPERATURE: 98.3 F | OXYGEN SATURATION: 99 % | RESPIRATION RATE: 18 BRPM | DIASTOLIC BLOOD PRESSURE: 75 MMHG | HEART RATE: 85 BPM

## 2023-08-25 DIAGNOSIS — D50.9 IRON DEFICIENCY ANEMIA, UNSPECIFIED IRON DEFICIENCY ANEMIA TYPE: Primary | ICD-10-CM

## 2023-08-25 PROCEDURE — 250N000011 HC RX IP 250 OP 636: Mod: JZ | Performed by: PHYSICIAN ASSISTANT

## 2023-08-25 PROCEDURE — 96366 THER/PROPH/DIAG IV INF ADDON: CPT

## 2023-08-25 PROCEDURE — 258N000003 HC RX IP 258 OP 636: Performed by: PHYSICIAN ASSISTANT

## 2023-08-25 PROCEDURE — 96365 THER/PROPH/DIAG IV INF INIT: CPT

## 2023-08-25 RX ORDER — EPINEPHRINE 1 MG/ML
0.3 INJECTION, SOLUTION INTRAMUSCULAR; SUBCUTANEOUS EVERY 5 MIN PRN
Status: CANCELLED | OUTPATIENT
Start: 2023-08-25

## 2023-08-25 RX ORDER — MEPERIDINE HYDROCHLORIDE 50 MG/ML
25 INJECTION INTRAMUSCULAR; INTRAVENOUS; SUBCUTANEOUS EVERY 30 MIN PRN
Status: CANCELLED | OUTPATIENT
Start: 2023-08-25

## 2023-08-25 RX ORDER — HEPARIN SODIUM (PORCINE) LOCK FLUSH IV SOLN 100 UNIT/ML 100 UNIT/ML
5 SOLUTION INTRAVENOUS
Status: CANCELLED | OUTPATIENT
Start: 2023-08-25

## 2023-08-25 RX ORDER — METHYLPREDNISOLONE SODIUM SUCCINATE 125 MG/2ML
125 INJECTION, POWDER, LYOPHILIZED, FOR SOLUTION INTRAMUSCULAR; INTRAVENOUS
Status: CANCELLED
Start: 2023-08-25

## 2023-08-25 RX ORDER — DIPHENHYDRAMINE HYDROCHLORIDE 50 MG/ML
50 INJECTION INTRAMUSCULAR; INTRAVENOUS
Status: CANCELLED
Start: 2023-08-25

## 2023-08-25 RX ORDER — ALBUTEROL SULFATE 0.83 MG/ML
2.5 SOLUTION RESPIRATORY (INHALATION)
Status: CANCELLED | OUTPATIENT
Start: 2023-08-25

## 2023-08-25 RX ORDER — ALBUTEROL SULFATE 90 UG/1
1-2 AEROSOL, METERED RESPIRATORY (INHALATION)
Status: CANCELLED
Start: 2023-08-25

## 2023-08-25 RX ORDER — HEPARIN SODIUM,PORCINE 10 UNIT/ML
5-20 VIAL (ML) INTRAVENOUS DAILY PRN
Status: CANCELLED | OUTPATIENT
Start: 2023-08-25

## 2023-08-25 RX ADMIN — SODIUM CHLORIDE 250 ML: 9 INJECTION, SOLUTION INTRAVENOUS at 13:52

## 2023-08-25 RX ADMIN — IRON SUCROSE 300 MG: 20 INJECTION, SOLUTION INTRAVENOUS at 13:53

## 2023-08-25 ASSESSMENT — PAIN SCALES - GENERAL: PAINLEVEL: NO PAIN (0)

## 2023-08-25 NOTE — PROGRESS NOTES
Infusion Nursing Note:  Huy Coles presents today for #3 of 3 Iron Sucrose 300mg.    Patient seen by provider today: No    Note: Pt arrives ambulatory with  to [facility]. Pt reports worsening fatigue yesterday. Pt denies any other changes in baseline.    /75   Pulse 85   Temp 98.3  F (36.8  C) (Oral)   Resp 18   LMP  (LMP Unknown)   SpO2 99%     Intravenous Access:  Peripheral IV placed in left AC.    Treatment Conditions:  Ferritin   Date Value Ref Range Status   07/18/2023 11 6 - 175 ng/mL Final     Post Infusion Assessment:  Patient tolerated infusion without incident.    Site patent and intact, free from redness, edema or discomfort.  No evidence of extravasations.  Access discontinued per protocol.     Discharge Plan:   Pt reports follow-up with Provider scheduled.  Patient discharged in stable condition accompanied by: self.  Departure Mode: Ambulatory.      Ifeoma Sanchez RN

## 2023-10-05 ENCOUNTER — MYC MEDICAL ADVICE (OUTPATIENT)
Dept: FAMILY MEDICINE | Facility: CLINIC | Age: 43
End: 2023-10-05

## 2023-10-05 ENCOUNTER — OFFICE VISIT (OUTPATIENT)
Dept: GASTROENTEROLOGY | Facility: CLINIC | Age: 43
End: 2023-10-05
Attending: INTERNAL MEDICINE
Payer: COMMERCIAL

## 2023-10-05 VITALS
HEART RATE: 85 BPM | WEIGHT: 224 LBS | DIASTOLIC BLOOD PRESSURE: 89 MMHG | BODY MASS INDEX: 37.32 KG/M2 | SYSTOLIC BLOOD PRESSURE: 131 MMHG | OXYGEN SATURATION: 97 % | HEIGHT: 65 IN

## 2023-10-05 DIAGNOSIS — D50.0 IRON DEFICIENCY ANEMIA DUE TO CHRONIC BLOOD LOSS: ICD-10-CM

## 2023-10-05 DIAGNOSIS — K21.00 GASTROESOPHAGEAL REFLUX DISEASE WITH ESOPHAGITIS WITHOUT HEMORRHAGE: ICD-10-CM

## 2023-10-05 DIAGNOSIS — K44.9 HIATAL HERNIA: Primary | ICD-10-CM

## 2023-10-05 DIAGNOSIS — D50.9 IRON DEFICIENCY ANEMIA, UNSPECIFIED IRON DEFICIENCY ANEMIA TYPE: Primary | ICD-10-CM

## 2023-10-05 PROCEDURE — 99215 OFFICE O/P EST HI 40 MIN: CPT | Performed by: PHYSICIAN ASSISTANT

## 2023-10-05 RX ORDER — MULTIVITAMIN,THERAPEUTIC
1 TABLET ORAL DAILY
COMMUNITY
End: 2024-07-11

## 2023-10-05 RX ORDER — ERGOCALCIFEROL (VITAMIN D2) 10 MCG
TABLET ORAL
COMMUNITY
End: 2024-07-11

## 2023-10-05 NOTE — PATIENT INSTRUCTIONS
It was a pleasure taking care of you today.  I've included a brief summary of our discussion and care plan from today's visit below.  Please review this information with your primary care provider.  _______________________________________________________________________    My recommendations are summarized as follows:    - Nutrition referral   - Discontinue use of carbonated beverages   - Continue Omeprazole 40 mg twice daily this is best taken on an empty stomach at least 30 - 60 minutes   - Follow up with primary care provide for medication weight management and management of anemia   - Please try to incorporate high iron foods into your daily diet   - If anemia persists could would then consider a capsule endoscopy to rule out small bowel etiology of anemia     Gastroesophageal Reflux Disease (GERD) Lifestyle Modifications:   If taking acid suppression therapy (PPI ie Pantoprazole, Lansoprazole, Omeprazole, Esomeprazole, Rabeprazole, Dexlansoprazole) it should be taken 30 - 60 minutes prior to meals on an empty stomach to have maximum effect  Avoid triggers for reflux such as coffee, chocolate, carbonated beverages, spicy foods, acidic foods (tomato based/citrus and foods with high fat content   Abstinence from alcohol and cessation of all tobacco products is recommended   Studies have shown that weight loss, exercise and maintaining a healthy BMI significantly reduce GERD symptoms   Remain upright while eating and immediately after meals  Do not eat or drink at least 3 hours prior to laying down supine/laying down for bed   Avoid late night/middle of the night snacking    Consider obtaining a wedge pillow or elevating the head end of the bed while sleeping   Avoid sleeping right side down as this can place the lower part of the esophagus/lower esophageal sphincter in a dependent position that favors reflux   Attempting to eat smaller more frequent meals may improve symptoms       To schedule endoscopic  procedures you may call: [phone]  To schedule radiology (imaging) tests you may call: [phone]  To schedule an ENT appointment you may call: [phone]    Please call my nurse Bernadine ([phone]), Tara ([phone]) with any questions or concerns.      Return to GI Clinic in 6 months to review your progress.    _______________________________________________________________________    Who do I call with any questions after my visit?  Please be in touch if there are any further questions that arise following today's visit.  There are multiple ways to contact your gastroenterology care team.      During business hours, you may reach a Gastroenterology nurse at [phone] and choose option 3.       To schedule or reschedule an appointment, please call [phone].     You can always send a secure message through [employer].  [employer] messages are answered by your nurse or doctor typically within 24 hours.  Please allow extra time on weekends and holidays.      For urgent/emergent questions after business hours, you may reach the on-call GI Fellow by contacting the [facility]  at [phone].     How will I get the results of any tests ordered?    You will receive all of your results.  If you have signed up for [employer], any tests ordered at your visit will be available to you after your physician reviews them.  Typically this takes 1-2 weeks.  If there are urgent results that require a change in your care plan, your physician or nurse will call you to discuss the next steps.      What is [employer]?  [employer] is a secure way for you to access all of your healthcare records from the [facility].  It is a web based computer program, so you can sign on to it from any location.  It also allows you to send secure messages to your care team.  I recommend signing up for [employer] access if you have not already done so and are comfortable with using a computer.      How to I schedule a  follow-up visit?  If you did not schedule a follow-up visit today, please call [phone] to schedule a follow-up office visit.      If you feel you received exceptional care and are interested in supporting the clinical and research goals of Katalina Shahid PA-C or the Division of Gastroenterology, Hepatology, and Nutrition please contact [email or web address] from the [facility] to discuss opportunities to donate.    Sincerely,    Katalina Shahid PA-C  Division of Gastroenterology, Hepatology, and Nutrition  [facility]

## 2023-10-05 NOTE — NURSING NOTE
"Chief Complaint   Patient presents with    Follow Up       Vitals:    10/05/23 0925   BP: 131/89   Pulse: 85   SpO2: 97%   Weight: 101.6 kg (224 lb)   Height: 1.651 m (5' 5\")       Body mass index is 37.28 kg/m .    [employer]    "

## 2023-10-05 NOTE — PROGRESS NOTES
Gastroenterology Visit for: Huy Coles 1980   MRN: [id number]     Reason for Visit:  chief complaint    Referred by: [facility]  / [facility] / [facility] [id number]  Patient Care Team:  Eduardo Keith APRN CNP as PCP - General (Family Medicine)  Eduardo Keith APRN CNP as Assigned PCP  Eduardo Keith APRN CNP as Nurse Practitioner (Family Medicine)  Jordan Hodges PA-C as Physician Assistant (Gastroenterology)  Danny Almodovar MD as Assigned Gastroenterology Provider  Lew Anderson MD as MD (Cardiovascular & Thoracic Surgery)  Lew Anderson MD as Assigned Heart and Vascular Provider    History of Present Illness:   Huy Coles is 43 year old female with significant past medical history pertinent for history of cocaine abuse, anxiety, depression, hiatal hernia and iron deficiency anemia who is presenting as a follow-up patient however is a new patient to myself who was previously seen by Dr. Almodovar with a chief complaint of GERD.    Interval History October 5, 2023:    Today Huy explains that she predominately experiences heartburn and that symptoms of regurgitation are typically more seldom. Her current medication regimen consists of Omeprazole 40 mg twice daily which she uses on an inconsistent basis.She had previously been taking Dexilant which had better controlled her symptoms however secondary to cost she can no longer obtain this medication. Additionally she notes that she sleeps with a wedge pillow.     As for her dietary patterns Huy reports that she is unable to eat meats, fruits or vegetables. Additionally she consumes 1 2L of soda per day. With intermittent consumption of tea. No consumption of coffee.      Denies weight loss, emesis, dysphagia, odynophagia, substernal chest pain, dysphonia/hoarseness, chronic cough, abdominal pain, diarrhea, constipation (< 3 stools per week), nocturnal stooling, incontinence of feces, melena, hematochezia and BRBR.     No use of NSIADs or  Tylenol. No use of OTC herbal supplements/weight loss products.      Denies use of ETOH and tobacco products. No recreational drug use.     Great grandmother had colon cancer and great uncles.     No additional family history or GI related malignancy (esophageal, gastric, pancreatic, liver or colon) or family history of IBD/celiac disease.     Please also see questionnaires below when reviewing subjective history.     -------------------------------------------------------------------------------------------------------------------------------------------------------------------------------------------------------------------------  4/24/2023 HPI Dr. Nishi Coles is a 42 year old female.     This is a very pleasant 42-year-old female with past medical history of uncontrolled acid reflux symptoms who is referred to this clinic for further evaluation and management.  Patient reports having had upper endoscopy several years ago where she was told she has hiatal hernia and she was noted to have mild esophagitis.  Currently she is taking omeprazole twice a day with frequent Tums and as needed Pepcid with symptoms occurring 2 to 3 days of a week.  She reports that during 5 days of the week when symptoms are controlled, they are at the best 80% controlled.  Overall she is doing poorly with symptoms including heartburn, regurgitation, vomiting, reflux, cough and chest comfort.  In the past she was told that she could get surgery however she did not get recommendation from her gastroenterologist.  She has tried multiple different proton pump inhibitor therapies including Prevacid and dexlansoprazole but she had insurance issues and cost constraint.  Therefore she had to switch back to omeprazole that she is taking currently.  She denies any family history of esophageal cancer.  She denies smoking.     She denies any melena, hematochezia, fresh blood in stool or abdominal pain.  She denies any significant dysphagia  or odynophagia or weight loss.  She denies taking any NSAIDs.    Esophageal Questionnaire(s)    BEDQ Questionnaire      4/17/2023    10:49 PM 9/28/2023     9:50 AM   BEDQ Questionnaire: How Often Have You Had the Following?   Trouble eating solid food (meat, bread, vegetables) 0 0   Trouble eating soft foods (yogurt, jello, pudding) 0 0   Trouble swallowing liquids 0 0   Pain while swallowing 0 0   Coughing or choking while swallowing foods or liquids 0 0   Total Score: 0 0         4/17/2023    10:49 PM 9/28/2023     9:50 AM   BEDQ Questionnaire: Discomfort/Pain Ratings   Eating solid food (meat, bread, vegetables) 0 0   Eating soft foods (yogurt, jello, pudding) 0 0   Drinking liquid 0 0   Total Score: 0 0       Eckardt Questionnaire      4/17/2023    10:51 PM 9/28/2023     9:51 AM   Eckardt Questionnaire   Dysphagia 0 0   Regurgitation 0 0   Retrosternal Pain 0 0   Weight Loss (kg) 0 0   Total Score:  0 0       Promis 10 Questionnaire      4/17/2023    10:53 PM 9/28/2023    10:12 AM   PROMIS 10 FLOWSHEET DATA   In general, would you say your health is: 4 4   In general, would you say your quality of life is: 4 5   In general, how would you rate your physical health? 3 3   In general, how would you rate your mental health, including your mood and your ability to think? 5 4   In general, how would you rate your satisfaction with your social activities and relationships? 5 5   In general, please rate how well you carry out your usual social activities and roles. (This includes activities at home, at work and in your community, and responsibilities as a parent, child, spouse, employee, friend, etc.) 5 5   To what extent are you able to carry out your everyday physical activities such as walking, climbing stairs, carrying groceries, or moving a chair? 4 4   In the past 7 days, how often have you been bothered by emotional problems such as feeling anxious, depressed, or irritable? 3 1   In the past 7 days, how would you  rate your fatigue on average? 4 3   In the past 7 days, how would you rate your pain on average, where 0 means no pain, and 10 means worst imaginable pain? 0 0   Mental health question re-calculation - no clinical value 3 5   Physical health question re-calculation - no clinical value 2 3   Pain question re-calculation - no clinical value 5 5   Global Mental Health Score 17 19   Global Physical Health Score 14 15   PROMIS TOTAL - SUBSCORES 31 34       STUDIES & PROCEDURES:    EGD:     6/20/2023  Findings:        Esophagogastric landmarks were identified: the Z-line was found at 35        cm, the upper extent of the gastric folds was found at 35 cm and the        site of hiatal narrowing was found at 40 cm from the incisors.        A 5 cm hiatal hernia was present.        The gastroesophageal flap valve was visualized endoscopically and        classified as Hill Grade IV (no fold, wide open lumen, hiatal hernia        present).        No gross lesions were noted in the entire examined stomach. Biopsies        were taken with a cold forceps for Helicobacter pylori testing.        No gross lesions were noted in the duodenal bulb, in the first portion        of the duodenum and in the second portion of the duodenum. Biopsies for        histology were taken with a cold forceps for evaluation of celiac        disease.                                                                                     Impression:               - Esophagogastric landmarks identified.                             - 5 cm hiatal hernia.                             - Gastroesophageal flap valve classified as Hill                             Grade IV (no fold, wide open lumen, hiatal hernia                             present).                             - No gross lesions in the entire stomach. Biopsied.                             - No gross lesions in the duodenal bulb, in the                             first portion of the duodenum and in  the second                             portion of the duodenum. Biopsied.     Final Diagnosis   A(1). Duodenum, biopsy:  -Small intestinal mucosa with no significant histopathologic abnormalities.  -Normal villous architecture identified and no prominence in intraepithelial lymphocytes seen.  -Negative for luminal organisms.  -Negative for dysplasia or malignancy.        B(2).  Stomach, antrum, body, biopsy:  - Oxyntic and antral type gastric mucosa with mild chronic inflammation.  - Negative for H. Pylori organisms on routine stains.  - Negative for intestinal metaplasia.   -Negative for dysplasia or malignancy       Colonoscopy:    6/20/2023  Findings:        The perianal and digital rectal examinations were normal. Pertinent        negatives include normal sphincter tone.        The terminal ileum appeared normal.        The colon (entire examined portion) appeared normal.        The retroflexed view of the distal rectum and anal verge was normal and        showed no anal or rectal abnormalities.                                                                                     Impression:               - The examined portion of the ileum was normal.                             - The entire examined colon is normal.                             - The distal rectum and anal verge are normal on                             retroflexion view.                             - No specimens collected.     EndoFLIP directed at the UES or LES (8cm (EF-325) balloon length or 16cm (EF-322) balloon length):   Date:  8cm balloon  Balloon inflation Balloon pressure CSA (mm^2) DI (mm^2/mmHg) Dmin (mm) Compliance   20 (ladmark ID)        30        40        50           16cm balloon  Balloon inflation Balloon pressure CSA (mm^2) DI (mm^2/mmHg) Dmin (mm) Compliance   30 (ladmark ID)        40        50        60        70           High Resolution Manometry:    PH/Impedance:     Bravo:    CT:    7/7/2023 CT Chest WO Contrast    IMPRESSION:   1.  Moderate hiatal hernia.  2.  Multifocal areas of tree-in-bud, clustered nodularity seen in the lungs, predominantly the left upper and lower lobes. Findings suggestive of multifocal bronchiolitis. A few scattered pulmonary nodules measuring up to 6 mm seen. Recommend 3-6 month   CT chest follow-up exam.    Esophagram:    FL VSS:     GES:    U/S:     XRAY:    Other:       Prior medical records were reviewed including, but not limited to, notes from referring providers, lab work, radiographic tests, and other diagnostic tests. Pertinent results were summarized above.     History     Past Medical History:   Diagnosis Date     Depressive disorder 1999       Past Surgical History:   Procedure Laterality Date     CHOLECYSTECTOMY  2006     COLONOSCOPY N/A 6/20/2023    Procedure: Colonoscopy;  Surgeon: Danny Almodovar MD;  Location:  GI     ENT SURGERY  1998    Tonsills and adenoids removed, sinus surgery     ESOPHAGOSCOPY, GASTROSCOPY, DUODENOSCOPY (EGD), COMBINED N/A 6/20/2023    Procedure: Esophagoscopy, gastroscopy, duodenoscopy (EGD), combined;  Surgeon: Danny Almodovar MD;  Location:  GI     ORTHOPEDIC SURGERY  2020       Social History     Socioeconomic History     Marital status:      Spouse name: Not on file     Number of children: Not on file     Years of education: Not on file     Highest education level: Not on file   Occupational History     Not on file   Tobacco Use     Smoking status: Never     Smokeless tobacco: Never   Vaping Use     Vaping Use: Never used   Substance and Sexual Activity     Alcohol use: Yes     Comment: occassional     Drug use: Never     Sexual activity: Yes     Partners: Male     Birth control/protection: None   Other Topics Concern     Parent/sibling w/ CABG, MI or angioplasty before 65F 55M? No   Social History Narrative     Not on file     Social Determinants of Health     Financial Resource Strain: Low Risk  (9/25/2023)    Financial Resource Strain       Within the past 12 months, have you or your family members you live with been unable to get utilities (heat, electricity) when it was really needed?: No   Food Insecurity: High Risk (9/25/2023)    Food Insecurity      Within the past 12 months, did you worry that your food would run out before you got money to buy more?: Yes      Within the past 12 months, did the food you bought just not last and you didn t have money to get more?: Yes   Transportation Needs: Low Risk  (9/25/2023)    Transportation Needs      Within the past 12 months, has lack of transportation kept you from medical appointments, getting your medicines, non-medical meetings or appointments, work, or from getting things that you need?: No   Physical Activity: Not on file   Stress: Not on file   Social Connections: Not on file   Interpersonal Safety: Not on file   Housing Stability: Low Risk  (9/25/2023)    Housing Stability      Do you have housing? : Yes      Are you worried about losing your housing?: No       Family History   Problem Relation Age of Onset     Depression Mother      Mental Illness Mother      Substance Abuse Mother      Osteoporosis Mother      Osteoporosis Maternal Grandmother      Colon Cancer Paternal Grandmother      Depression Son      Anxiety Disorder Son      Family history reviewed and edited as appropriate    Medications and Allergies:     Outpatient Encounter Medications as of 10/5/2023   Medication Sig Dispense Refill     busPIRone (BUSPAR) 5 MG tablet Take 1-2 tablets (5-10 mg) by mouth 3 times daily 180 tablet 1     fluticasone (FLONASE) 50 MCG/ACT nasal spray Spray 1 spray into both nostrils daily 18 mL 0     omeprazole (PRILOSEC) 40 MG DR capsule Take 40 mg by mouth       phentermine (ADIPEX-P) 37.5 MG capsule Take 1 capsule (37.5 mg) by mouth every morning 30 capsule 1     sertraline (ZOLOFT) 100 MG tablet Take 1 tablet (100 mg) by mouth daily 90 tablet 1     topiramate (TOPAMAX) 25 MG tablet Take 1 tablet (25  mg) by mouth At Bedtime for 7 days, THEN 2 tablets (50 mg) At Bedtime for 7 days, THEN 3 tablets (75 mg) At Bedtime for 7 days, THEN 2 tablets (50 mg) two times daily for 60 days. 282 tablet 0     No facility-administered encounter medications on file as of 10/5/2023.        Allergies   Allergen Reactions     Azithromycin Hives     Codeine Other (See Comments)     Drowsiness, severe     Tramadol Other (See Comments)     Confusion     Cefaclor Rash     Sulfa Antibiotics Hives and Rash        Review of systems:  A full 10 point review of systems was obtained and was negative except for the pertinent positives and negatives stated within the HPI.    Objective Findings:   Physical Exam:    Constitutional: There were no vitals taken for this visit.  General: Alert, cooperative, no distress, well-appearing  Head: Atraumatic, normocephalic, no obvious abnormalities   Eyes: Sclera anicteric, no obvious conjunctival hemorrhage   Nose: Nares normal, no obvious malformation, no obvious rhinorrhea   Respiratory: Resting comfortably, no apparent distress, no cough.  Gastrointestinal: Soft, non- distended  Skin: No jaundice, no obvious rash  Neurologic: AAOx3, no obvious neurologic abnormality  Psychiatric: Normal Affect, appropriate mood  Extremities: No obvious edema, no obvious malformation     Labs, Radiology, Pathology     Lab Results   Component Value Date    WBC 10.2 07/18/2023    WBC 11.8 (H) 06/07/2023    WBC 10.4 03/27/2023    HGB 8.6 (L) 07/18/2023    HGB 8.2 (L) 06/07/2023    HGB 8.1 (L) 03/27/2023     (H) 07/18/2023     06/07/2023     (H) 03/27/2023    CHOL 204 (H) 03/27/2023    TRIG 209 (H) 03/27/2023    HDL 44 (L) 03/27/2023    ALT 10 03/27/2023    AST 22 03/27/2023     07/18/2023     03/27/2023    BUN 13.2 07/18/2023    BUN 8.8 03/27/2023    CO2 23 07/18/2023    CO2 23 03/27/2023    TSH 1.53 03/27/2023        Liver Function Studies -   Recent Labs   Lab Test 03/27/23  1521    PROTTOTAL 7.9   ALBUMIN 4.3   BILITOTAL 0.4   ALKPHOS 115*   AST 22   ALT 10          Patient Active Problem List    Diagnosis Date Noted     Iron deficiency anemia, unspecified iron deficiency anemia type 08/01/2023     Priority: Medium     Esophagitis determined by endoscopy 08/01/2023     Priority: Medium     Pain of finger of right hand 04/20/2023     Priority: Medium     Orthopedic aftercare 04/20/2023     Priority: Medium     Closed fracture of nasal bones 04/22/2020     Priority: Medium     Last Assessment & Plan:   Formatting of this note might be different from the original.  Overall the patient appears to be healing quite nicely.  She no longer has her splint on.  I told her she needs to be careful as up to 6 weeks need to past before her nasal bones are likely to stay in place completely with any significant trauma.  I did explain that any fracture is more likely to refracture compared with native bone.    From my perspective she can return to relatively normal activity.  She should continue sinus precautions for up to 6 weeks.  This includes not blowing her nose and not bending pushing straining or sneezing if at all possible.    With respect to her nasal fractures, I do not need to see her unless she has other issues or concerns.  He is pleased with her overall appearance and her eyesight.  She should call return with other issues or concerns.       Drug abuse, cocaine type (H) 04/22/2020     Priority: Medium     Last Assessment & Plan:   Formatting of this note might be different from the original.  The patient has a history of documented cocaine abuse.  She had a positive tox screen when she came to the ED on Saturday.  It is unclear how she obtained the injuries that she had but it is apparent that she had been under the influence at the time of which she sustained the injuries.    I did  her briefly on difficulties with healing related to cocaine use and recommended against future use.        Finger deformity, acquired, right 04/22/2020     Priority: Medium     Last Assessment & Plan:   Formatting of this note might be different from the original.  The patient has a residual deformity of the digit as previously described.  She had this treated by another hand surgeon and is wondering if something could be done.    I explained that due to the finding intercurrent therapy needs, I would like to wait several months before doing anything for this.  She may be a candidate for a derotational osteotomy or a closing wedge osteotomy in order to better occlude a fist as she is dropping change through the gap that is created by her canted ring finger.  I explained that ideally the original surgeon should treat this, however she stated that she is not interested in returning to the original surgeon.    For this reason I stated that we could address this at a later date.  Due to her history of substance abuse I would want a negative screen prior to any surgical intervention.  We can readdress this in 4 to 6 months when she has completed her therapy to assess whether or not she would benefit from additional intervention.  In the meantime I would like her to get the IP joints as supple as possible to see if this can help occlude this area.       Hiatal hernia with gastroesophageal reflux disease and esophagitis 04/22/2020     Priority: Medium     Last Assessment & Plan:   Formatting of this note might be different from the original.  The patient has significant and continued problems with reflux.  She has a history of a hiatal hernia which has previously been diagnosed.  She did ask if she could have a referral to someone to help treat this.  She previously saw 1 surgeon but has been since fired from that practice according to her.    I explained that 1 of my partners does treat hiatal hernia is usually with minimally invasive methods.  I did explain that some of her illicit substance use may create issues with  that but I am happy to refer her on for a consultation at the very least.  I also explained that this may be viewed as a nonessential surgery and as a result she may have to wait to have this done.  She voiced understanding but would like the referral today.  I will provide this to Dr. Kyler Rockwell with general surgery in my office       Maxillary fracture, unspecified side, initial encounter for closed fracture (H) 04/22/2020     Priority: Medium     Last Assessment & Plan:   Formatting of this note might be different from the original.  The patient has bilateral nondisplaced maxillary fractures which communicate with the sinus.  She should continue her prophylactic antibiotics for her maxillary sinus fractures.  She should also perform sinus precautions including not blowing her nose, no bending pushing straining or pulling, and if she has to sneeze to keep her mouth open.    I explained that these are likely not going to displace and her periorbital ecchymosis will go away in about 2 weeks.  She has no ocular complaints that she has no change in projection or the appearance of her midface.    I do not anticipate she will need any surgical intervention for any of these issues.  She should call or return with other issues or concerns but otherwise I will plan to see her in 2 weeks and she should maintain her current restrictions.       Abnormality of heart beat 03/20/2019     Priority: Medium     Hair loss 08/14/2017     Priority: Medium     Last Assessment & Plan:   Formatting of this note might be different from the original.  TSH ordered       Fatigue 01/28/2016     Priority: Medium     Last Assessment & Plan:   Formatting of this note might be different from the original.  TSH ordered       Vitamin D deficiency 01/28/2016     Priority: Medium     Anxiety disorder 08/17/2012     Priority: Medium     Last Assessment & Plan:   Formatting of this note might be different from the original.  Stable, continue current  medications.       Major depressive disorder 08/17/2012     Priority: Medium     Last Assessment & Plan:   Formatting of this note might be different from the original.  Stable, continue current medications.        Assessment and Plan   Assessment/Plan:    Huy Coles is 43 year old female with significant past medical history pertinent for history of cocaine abuse, anxiety, depression, hiatal hernia and iron deficiency anemia who is presenting as a follow-up patient however is a new patient to myself who was previously seen by Dr. Almodovar with a chief complaint of heartburn/regurgitation.    Prior Evaluation Includes:     EGD 6/20/2023 notable for 5 cm hiatal hernia, the GE flap valve was described as Hill grade 4.  There were no gross lesions within the entirety of the stomach or small bowel.  Duodenal biopsies were negative for any  significant histopathological abnormalities.  There is no evidence of celiac disease.  Gastric biopsies with mild chronic inflammation.  Negative for H. pylori and test metaplasia.    Colonoscopy 6/20/2023 to the terminal ileum was unremarkable.  No biopsies were obtained.    7/17/2023 consultation with thoracic surgery Dr. Anderson who recommended that patient was not currently a good surgical candidate secondary to her BMI (suggested 20 pound weight loss before considering surgery).  Recommendations were for patient to focus on weight loss with eventual hiatal hernia repair and consideration of bariatric surgery with Dr. Brown.    7/24/2023 patient was seen by her primary care provider for follow-up of her anemia.  Labs including homocystine, methylmalonic acid, protein electrophoresis, lactate dehydrogenase, B12, vitamin D and folate were unremarkable.  Iron studies were notable for low total iron and iron saturation index with mild improvement from 6 months prior.  Peripheral smear was notable for microcytic and hypochromic morphology. She was sequently prescribed Venofer 300 mg  and has completed 3/3 infusions.    #Hiatal Hernia   #GERD   #Anemia - Improved    Today Huy presents with symptoms of reflux that are predominately heartburn with more seldom regurgitation. Associated symptoms include nausea and substernal chest discomfort. She attempts to sleep with a wedge pillow however she does not follow additional reflux lifestyle modifications. She consumes at least 1 2L of soda per day and is not always consistent with her BID PPI. Today an emphasis was placed on weight loss as well as lifestyle modifications to aid in controlling breakthrough reflux symptoms.    If anemia were to reoccur could consider capsule endoscopy. However endoscopic placement should be considered in setting of a hiatal hernia. Additionally Jose Pelletier lesions could be contributing from within the hernial sac which cannot always be appreciated on endoscopy.       - Nutrition referral   - Discontinue use of carbonated beverages   - Continue Omeprazole 40 mg twice daily this is best taken on an empty stomach at least 30 - 60 minutes   - Reflux lifestyle modifications as directed within the AVS  - Follow up with primary care provide for medication weight management and management of anemia   - Please try to incorporate high iron foods into your daily diet   - If anemia persists could would then consider a capsule endoscopy to rule out small bowel etiology of anemia            #Colorectal Cancer Screening   Colonoscopy 6/2023 that was unremarkable. Family history pertinent for great grandmother had colon cancer and great uncles with possible colon cancer as well. Per performing endoscopist recall colon cancer screening can be completed in 10 years or sooner if otherwise indicated.                                                                                                  Follow up plan:   Return to clinic 6 months and as needed.    The risks and benefits of my recommendations, as well as other treatment  options were discussed with the patient and any available family today. All questions were answered.     o Follow up: As planned above. Today, I personally spent 31 minutes in direct face to face time with the patient, of which greater than 50% of the time was spent in patient education and counseling as described above. Approximately 11 minutes were spent on indirect care associated with the patient's consultation including but not limited to review of: patient medical records to date, clinic visits, hospital records, lab results, imaging studies, procedural documentation, and coordinating care with other providers. The findings from this review are summarized in the above note. All of the above accounted for a cumulative time of 42 minutes and was performed on the date of service.     The patient verbalized understanding of the plan and was appreciative for the time spent and information provided during the office visit.           Katalina Shahid PA-C  Division of Gastroenterology, Hepatology, and Nutrition  [facility]       Documentation assisted by voice recognition and documentation system.

## 2023-10-05 NOTE — LETTER
10/5/2023      RE: Huy Coles  [address]  [address] [place] [address]       Gastroenterology Visit for: Huy Coles 1980   MRN: [id number]     Reason for Visit:  chief complaint    Referred by: Nishi  / [facility] [id number]  Patient Care Team:  Eduardo Keith APRN CNP as PCP - General (Family Medicine)  Eduardo Keith APRN CNP as Assigned PCP  Eduardo Keith APRN CNP as Nurse Practitioner (Family Medicine)  Jordan Hodges PA-C as Physician Assistant (Gastroenterology)  Danny Almodovar MD as Assigned Gastroenterology Provider  Lew Anderson MD as MD (Cardiovascular & Thoracic Surgery)  Lew Anderson MD as Assigned Heart and Vascular Provider    History of Present Illness:   Huy Coles is 43 year old female with significant past medical history pertinent for history of cocaine abuse, anxiety, depression, hiatal hernia and iron deficiency anemia who is presenting as a follow-up patient however is a new patient to myself who was previously seen by Dr. Almodovar with a chief complaint of GERD.    Interval History October 5, 2023:    Today Huy explains that she predominately experiences heartburn and that symptoms of regurgitation are typically more seldom. Her current medication regimen consists of Omeprazole 40 mg twice daily which she uses on an inconsistent basis.She had previously been taking Dexilant which had better controlled her symptoms however secondary to cost she can no longer obtain this medication. Additionally she notes that she sleeps with a wedge pillow.     As for her dietary patterns Huy reports that she is unable to eat meats, fruits or vegetables. Additionally she consumes 1 2L of soda per day. With intermittent consumption of tea. No consumption of coffee.      Denies weight loss, emesis, dysphagia, odynophagia, substernal chest pain, dysphonia/hoarseness, chronic cough, abdominal pain, diarrhea, constipation (< 3 stools per week), nocturnal  stooling, incontinence of feces, melena, hematochezia and BRBR.     No use of NSIADs or Tylenol. No use of OTC herbal supplements/weight loss products.      Denies use of ETOH and tobacco products. No recreational drug use.     Great grandmother had colon cancer and great uncles.     No additional family history or GI related malignancy (esophageal, gastric, pancreatic, liver or colon) or family history of IBD/celiac disease.     Please also see questionnaires below when reviewing subjective history.     -------------------------------------------------------------------------------------------------------------------------------------------------------------------------------------------------------------------------  4/24/2023 HPI Dr. Nishi Coles is a 42 year old female.     This is a very pleasant 42-year-old female with past medical history of uncontrolled acid reflux symptoms who is referred to this clinic for further evaluation and management.  Patient reports having had upper endoscopy several years ago where she was told she has hiatal hernia and she was noted to have mild esophagitis.  Currently she is taking omeprazole twice a day with frequent Tums and as needed Pepcid with symptoms occurring 2 to 3 days of a week.  She reports that during 5 days of the week when symptoms are controlled, they are at the best 80% controlled.  Overall she is doing poorly with symptoms including heartburn, regurgitation, vomiting, reflux, cough and chest comfort.  In the past she was told that she could get surgery however she did not get recommendation from her gastroenterologist.  She has tried multiple different proton pump inhibitor therapies including Prevacid and dexlansoprazole but she had insurance issues and cost constraint.  Therefore she had to switch back to omeprazole that she is taking currently.  She denies any family history of esophageal cancer.  She denies smoking.     She denies any melena,  hematochezia, fresh blood in stool or abdominal pain.  She denies any significant dysphagia or odynophagia or weight loss.  She denies taking any NSAIDs.    Esophageal Questionnaire(s)    BEDQ Questionnaire      4/17/2023    10:49 PM 9/28/2023     9:50 AM   BEDQ Questionnaire: How Often Have You Had the Following?   Trouble eating solid food (meat, bread, vegetables) 0 0   Trouble eating soft foods (yogurt, jello, pudding) 0 0   Trouble swallowing liquids 0 0   Pain while swallowing 0 0   Coughing or choking while swallowing foods or liquids 0 0   Total Score: 0 0         4/17/2023    10:49 PM 9/28/2023     9:50 AM   BEDQ Questionnaire: Discomfort/Pain Ratings   Eating solid food (meat, bread, vegetables) 0 0   Eating soft foods (yogurt, jello, pudding) 0 0   Drinking liquid 0 0   Total Score: 0 0       Eckardt Questionnaire      4/17/2023    10:51 PM 9/28/2023     9:51 AM   Eckardt Questionnaire   Dysphagia 0 0   Regurgitation 0 0   Retrosternal Pain 0 0   Weight Loss (kg) 0 0   Total Score:  0 0       Promis 10 Questionnaire      4/17/2023    10:53 PM 9/28/2023    10:12 AM   PROMIS 10 FLOWSHEET DATA   In general, would you say your health is: 4 4   In general, would you say your quality of life is: 4 5   In general, how would you rate your physical health? 3 3   In general, how would you rate your mental health, including your mood and your ability to think? 5 4   In general, how would you rate your satisfaction with your social activities and relationships? 5 5   In general, please rate how well you carry out your usual social activities and roles. (This includes activities at home, at work and in your community, and responsibilities as a parent, child, spouse, employee, friend, etc.) 5 5   To what extent are you able to carry out your everyday physical activities such as walking, climbing stairs, carrying groceries, or moving a chair? 4 4   In the past 7 days, how often have you been bothered by emotional  problems such as feeling anxious, depressed, or irritable? 3 1   In the past 7 days, how would you rate your fatigue on average? 4 3   In the past 7 days, how would you rate your pain on average, where 0 means no pain, and 10 means worst imaginable pain? 0 0   Mental health question re-calculation - no clinical value 3 5   Physical health question re-calculation - no clinical value 2 3   Pain question re-calculation - no clinical value 5 5   Global Mental Health Score 17 19   Global Physical Health Score 14 15   PROMIS TOTAL - SUBSCORES 31 34       STUDIES & PROCEDURES:    EGD:     6/20/2023  Findings:        Esophagogastric landmarks were identified: the Z-line was found at 35        cm, the upper extent of the gastric folds was found at 35 cm and the        site of hiatal narrowing was found at 40 cm from the incisors.        A 5 cm hiatal hernia was present.        The gastroesophageal flap valve was visualized endoscopically and        classified as Hill Grade IV (no fold, wide open lumen, hiatal hernia        present).        No gross lesions were noted in the entire examined stomach. Biopsies        were taken with a cold forceps for Helicobacter pylori testing.        No gross lesions were noted in the duodenal bulb, in the first portion        of the duodenum and in the second portion of the duodenum. Biopsies for        histology were taken with a cold forceps for evaluation of celiac        disease.                                                                                     Impression:               - Esophagogastric landmarks identified.                             - 5 cm hiatal hernia.                             - Gastroesophageal flap valve classified as Hill                             Grade IV (no fold, wide open lumen, hiatal hernia                             present).                             - No gross lesions in the entire stomach. Biopsied.                             - No gross  lesions in the duodenal bulb, in the                             first portion of the duodenum and in the second                             portion of the duodenum. Biopsied.     Final Diagnosis   A(1). Duodenum, biopsy:  -Small intestinal mucosa with no significant histopathologic abnormalities.  -Normal villous architecture identified and no prominence in intraepithelial lymphocytes seen.  -Negative for luminal organisms.  -Negative for dysplasia or malignancy.        B(2).  Stomach, antrum, body, biopsy:  - Oxyntic and antral type gastric mucosa with mild chronic inflammation.  - Negative for H. Pylori organisms on routine stains.  - Negative for intestinal metaplasia.   -Negative for dysplasia or malignancy       Colonoscopy:    6/20/2023  Findings:        The perianal and digital rectal examinations were normal. Pertinent        negatives include normal sphincter tone.        The terminal ileum appeared normal.        The colon (entire examined portion) appeared normal.        The retroflexed view of the distal rectum and anal verge was normal and        showed no anal or rectal abnormalities.                                                                                     Impression:               - The examined portion of the ileum was normal.                             - The entire examined colon is normal.                             - The distal rectum and anal verge are normal on                             retroflexion view.                             - No specimens collected.     EndoFLIP directed at the UES or LES (8cm (EF-325) balloon length or 16cm (EF-322) balloon length):   Date:  8cm balloon  Balloon inflation Balloon pressure CSA (mm^2) DI (mm^2/mmHg) Dmin (mm) Compliance   20 (ladmark ID)        30        40        50           16cm balloon  Balloon inflation Balloon pressure CSA (mm^2) DI (mm^2/mmHg) Dmin (mm) Compliance   30 (ladmark ID)        40        50        60        70            High Resolution Manometry:    PH/Impedance:     Bravo:    CT:    7/7/2023 CT Chest WO Contrast   IMPRESSION:   1.  Moderate hiatal hernia.  2.  Multifocal areas of tree-in-bud, clustered nodularity seen in the lungs, predominantly the left upper and lower lobes. Findings suggestive of multifocal bronchiolitis. A few scattered pulmonary nodules measuring up to 6 mm seen. Recommend 3-6 month   CT chest follow-up exam.    Esophagram:    FL VSS:     GES:    U/S:     XRAY:    Other:       Prior medical records were reviewed including, but not limited to, notes from referring providers, lab work, radiographic tests, and other diagnostic tests. Pertinent results were summarized above.     History     Past Medical History:   Diagnosis Date    Depressive disorder 1999       Past Surgical History:   Procedure Laterality Date    CHOLECYSTECTOMY  2006    COLONOSCOPY N/A 6/20/2023    Procedure: Colonoscopy;  Surgeon: Danny Almodovar MD;  Location:  GI    ENT SURGERY  1998    Tonsills and adenoids removed, sinus surgery    ESOPHAGOSCOPY, GASTROSCOPY, DUODENOSCOPY (EGD), COMBINED N/A 6/20/2023    Procedure: Esophagoscopy, gastroscopy, duodenoscopy (EGD), combined;  Surgeon: Danny Almodovar MD;  Location:  GI    ORTHOPEDIC SURGERY  2020       Social History     Socioeconomic History    Marital status:      Spouse name: Not on file    Number of children: Not on file    Years of education: Not on file    Highest education level: Not on file   Occupational History    Not on file   Tobacco Use    Smoking status: Never    Smokeless tobacco: Never   Vaping Use    Vaping Use: Never used   Substance and Sexual Activity    Alcohol use: Yes     Comment: occassional    Drug use: Never    Sexual activity: Yes     Partners: Male     Birth control/protection: None   Other Topics Concern    Parent/sibling w/ CABG, MI or angioplasty before 65F 55M? No   Social History Narrative    Not on file     Social Determinants of Health      Financial Resource Strain: Low Risk  (9/25/2023)    Financial Resource Strain     Within the past 12 months, have you or your family members you live with been unable to get utilities (heat, electricity) when it was really needed?: No   Food Insecurity: High Risk (9/25/2023)    Food Insecurity     Within the past 12 months, did you worry that your food would run out before you got money to buy more?: Yes     Within the past 12 months, did the food you bought just not last and you didn t have money to get more?: Yes   Transportation Needs: Low Risk  (9/25/2023)    Transportation Needs     Within the past 12 months, has lack of transportation kept you from medical appointments, getting your medicines, non-medical meetings or appointments, work, or from getting things that you need?: No   Physical Activity: Not on file   Stress: Not on file   Social Connections: Not on file   Interpersonal Safety: Not on file   Housing Stability: Low Risk  (9/25/2023)    Housing Stability     Do you have housing? : Yes     Are you worried about losing your housing?: No       Family History   Problem Relation Age of Onset    Depression Mother     Mental Illness Mother     Substance Abuse Mother     Osteoporosis Mother     Osteoporosis Maternal Grandmother     Colon Cancer Paternal Grandmother     Depression Son     Anxiety Disorder Son      Family history reviewed and edited as appropriate    Medications and Allergies:     Outpatient Encounter Medications as of 10/5/2023   Medication Sig Dispense Refill    busPIRone (BUSPAR) 5 MG tablet Take 1-2 tablets (5-10 mg) by mouth 3 times daily 180 tablet 1    fluticasone (FLONASE) 50 MCG/ACT nasal spray Spray 1 spray into both nostrils daily 18 mL 0    omeprazole (PRILOSEC) 40 MG DR capsule Take 40 mg by mouth      phentermine (ADIPEX-P) 37.5 MG capsule Take 1 capsule (37.5 mg) by mouth every morning 30 capsule 1    sertraline (ZOLOFT) 100 MG tablet Take 1 tablet (100 mg) by mouth daily 90  tablet 1    topiramate (TOPAMAX) 25 MG tablet Take 1 tablet (25 mg) by mouth At Bedtime for 7 days, THEN 2 tablets (50 mg) At Bedtime for 7 days, THEN 3 tablets (75 mg) At Bedtime for 7 days, THEN 2 tablets (50 mg) two times daily for 60 days. 282 tablet 0     No facility-administered encounter medications on file as of 10/5/2023.        Allergies   Allergen Reactions    Azithromycin Hives    Codeine Other (See Comments)     Drowsiness, severe    Tramadol Other (See Comments)     Confusion    Cefaclor Rash    Sulfa Antibiotics Hives and Rash        Review of systems:  A full 10 point review of systems was obtained and was negative except for the pertinent positives and negatives stated within the HPI.    Objective Findings:   Physical Exam:    Constitutional: There were no vitals taken for this visit.  General: Alert, cooperative, no distress, well-appearing  Head: Atraumatic, normocephalic, no obvious abnormalities   Eyes: Sclera anicteric, no obvious conjunctival hemorrhage   Nose: Nares normal, no obvious malformation, no obvious rhinorrhea   Respiratory: Resting comfortably, no apparent distress, no cough.  Gastrointestinal: Soft, non- distended  Skin: No jaundice, no obvious rash  Neurologic: AAOx3, no obvious neurologic abnormality  Psychiatric: Normal Affect, appropriate mood  Extremities: No obvious edema, no obvious malformation     Labs, Radiology, Pathology     Lab Results   Component Value Date    WBC 10.2 07/18/2023    WBC 11.8 (H) 06/07/2023    WBC 10.4 03/27/2023    HGB 8.6 (L) 07/18/2023    HGB 8.2 (L) 06/07/2023    HGB 8.1 (L) 03/27/2023     (H) 07/18/2023     06/07/2023     (H) 03/27/2023    CHOL 204 (H) 03/27/2023    TRIG 209 (H) 03/27/2023    HDL 44 (L) 03/27/2023    ALT 10 03/27/2023    AST 22 03/27/2023     07/18/2023     03/27/2023    BUN 13.2 07/18/2023    BUN 8.8 03/27/2023    CO2 23 07/18/2023    CO2 23 03/27/2023    TSH 1.53 03/27/2023        Liver Function  Studies -   Recent Labs   Lab Test 03/27/23  1521   PROTTOTAL 7.9   ALBUMIN 4.3   BILITOTAL 0.4   ALKPHOS 115*   AST 22   ALT 10          Patient Active Problem List    Diagnosis Date Noted    Iron deficiency anemia, unspecified iron deficiency anemia type 08/01/2023     Priority: Medium    Esophagitis determined by endoscopy 08/01/2023     Priority: Medium    Pain of finger of right hand 04/20/2023     Priority: Medium    Orthopedic aftercare 04/20/2023     Priority: Medium    Closed fracture of nasal bones 04/22/2020     Priority: Medium     Last Assessment & Plan:   Formatting of this note might be different from the original.  Overall the patient appears to be healing quite nicely.  She no longer has her splint on.  I told her she needs to be careful as up to 6 weeks need to past before her nasal bones are likely to stay in place completely with any significant trauma.  I did explain that any fracture is more likely to refracture compared with native bone.    From my perspective she can return to relatively normal activity.  She should continue sinus precautions for up to 6 weeks.  This includes not blowing her nose and not bending pushing straining or sneezing if at all possible.    With respect to her nasal fractures, I do not need to see her unless she has other issues or concerns.  He is pleased with her overall appearance and her eyesight.  She should call return with other issues or concerns.      Drug abuse, cocaine type (H) 04/22/2020     Priority: Medium     Last Assessment & Plan:   Formatting of this note might be different from the original.  The patient has a history of documented cocaine abuse.  She had a positive tox screen when she came to the ED on Saturday.  It is unclear how she obtained the injuries that she had but it is apparent that she had been under the influence at the time of which she sustained the injuries.    I did  her briefly on difficulties with healing related to cocaine  use and recommended against future use.      Finger deformity, acquired, right 04/22/2020     Priority: Medium     Last Assessment & Plan:   Formatting of this note might be different from the original.  The patient has a residual deformity of the digit as previously described.  She had this treated by another hand surgeon and is wondering if something could be done.    I explained that due to the finding intercurrent therapy needs, I would like to wait several months before doing anything for this.  She may be a candidate for a derotational osteotomy or a closing wedge osteotomy in order to better occlude a fist as she is dropping change through the gap that is created by her canted ring finger.  I explained that ideally the original surgeon should treat this, however she stated that she is not interested in returning to the original surgeon.    For this reason I stated that we could address this at a later date.  Due to her history of substance abuse I would want a negative screen prior to any surgical intervention.  We can readdress this in 4 to 6 months when she has completed her therapy to assess whether or not she would benefit from additional intervention.  In the meantime I would like her to get the IP joints as supple as possible to see if this can help occlude this area.      Hiatal hernia with gastroesophageal reflux disease and esophagitis 04/22/2020     Priority: Medium     Last Assessment & Plan:   Formatting of this note might be different from the original.  The patient has significant and continued problems with reflux.  She has a history of a hiatal hernia which has previously been diagnosed.  She did ask if she could have a referral to someone to help treat this.  She previously saw 1 surgeon but has been since fired from that practice according to her.    I explained that 1 of my partners does treat hiatal hernia is usually with minimally invasive methods.  I did explain that some of her illicit  substance use may create issues with that but I am happy to refer her on for a consultation at the very least.  I also explained that this may be viewed as a nonessential surgery and as a result she may have to wait to have this done.  She voiced understanding but would like the referral today.  I will provide this to Dr. Kyler Rockwell with general surgery in my office      Maxillary fracture, unspecified side, initial encounter for closed fracture (H) 04/22/2020     Priority: Medium     Last Assessment & Plan:   Formatting of this note might be different from the original.  The patient has bilateral nondisplaced maxillary fractures which communicate with the sinus.  She should continue her prophylactic antibiotics for her maxillary sinus fractures.  She should also perform sinus precautions including not blowing her nose, no bending pushing straining or pulling, and if she has to sneeze to keep her mouth open.    I explained that these are likely not going to displace and her periorbital ecchymosis will go away in about 2 weeks.  She has no ocular complaints that she has no change in projection or the appearance of her midface.    I do not anticipate she will need any surgical intervention for any of these issues.  She should call or return with other issues or concerns but otherwise I will plan to see her in 2 weeks and she should maintain her current restrictions.      Abnormality of heart beat 03/20/2019     Priority: Medium    Hair loss 08/14/2017     Priority: Medium     Last Assessment & Plan:   Formatting of this note might be different from the original.  TSH ordered      Fatigue 01/28/2016     Priority: Medium     Last Assessment & Plan:   Formatting of this note might be different from the original.  TSH ordered      Vitamin D deficiency 01/28/2016     Priority: Medium    Anxiety disorder 08/17/2012     Priority: Medium     Last Assessment & Plan:   Formatting of this note might be different from the  original.  Stable, continue current medications.      Major depressive disorder 08/17/2012     Priority: Medium     Last Assessment & Plan:   Formatting of this note might be different from the original.  Stable, continue current medications.        Assessment and Plan   Assessment/Plan:    Huy Coles is 43 year old female with significant past medical history pertinent for history of cocaine abuse, anxiety, depression, hiatal hernia and iron deficiency anemia who is presenting as a follow-up patient however is a new patient to myself who was previously seen by Dr. Almodovar with a chief complaint of heartburn/regurgitation.    Prior Evaluation Includes:     EGD 6/20/2023 notable for 5 cm hiatal hernia, the GE flap valve was described as Hill grade 4.  There were no gross lesions within the entirety of the stomach or small bowel.  Duodenal biopsies were negative for any  significant histopathological abnormalities.  There is no evidence of celiac disease.  Gastric biopsies with mild chronic inflammation.  Negative for H. pylori and test metaplasia.    Colonoscopy 6/20/2023 to the terminal ileum was unremarkable.  No biopsies were obtained.    7/17/2023 consultation with thoracic surgery Dr. Anderson who recommended that patient was not currently a good surgical candidate secondary to her BMI (suggested 20 pound weight loss before considering surgery).  Recommendations were for patient to focus on weight loss with eventual hiatal hernia repair and consideration of bariatric surgery with Dr. Brown.    7/24/2023 patient was seen by her primary care provider for follow-up of her anemia.  Labs including homocystine, methylmalonic acid, protein electrophoresis, lactate dehydrogenase, B12, vitamin D and folate were unremarkable.  Iron studies were notable for low total iron and iron saturation index with mild improvement from 6 months prior.  Peripheral smear was notable for microcytic and hypochromic morphology. She was  sequently prescribed Venofer 300 mg and has completed 3/3 infusions.    #Hiatal Hernia   #GERD   #Anemia - Improved    Today Huy presents with symptoms of reflux that are predominately heartburn with more seldom regurgitation. Associated symptoms include nausea and substernal chest discomfort. She attempts to sleep with a wedge pillow however she does not follow additional reflux lifestyle modifications. She consumes at least 1 2L of soda per day and is not always consistent with her BID PPI. Today an emphasis was placed on weight loss as well as lifestyle modifications to aid in controlling breakthrough reflux symptoms.    If anemia were to reoccur could consider capsule endoscopy. However endoscopic placement should be considered in setting of a hiatal hernia. Additionally Jose Pelletier lesions could be contributing from within the hernial sac which cannot always be appreciated on endoscopy.       - Nutrition referral   - Discontinue use of carbonated beverages   - Continue Omeprazole 40 mg twice daily this is best taken on an empty stomach at least 30 - 60 minutes   - Reflux lifestyle modifications as directed within the AVS  - Follow up with primary care provide for medication weight management and management of anemia   - Please try to incorporate high iron foods into your daily diet   - If anemia persists could would then consider a capsule endoscopy to rule out small bowel etiology of anemia            #Colorectal Cancer Screening   Colonoscopy 6/2023 that was unremarkable. Family history pertinent for great grandmother had colon cancer and great uncles with possible colon cancer as well. Per performing endoscopist recall colon cancer screening can be completed in 10 years or sooner if otherwise indicated.                                                                                                  Follow up plan:   Return to clinic 6 months and as needed.    The risks and benefits of my  recommendations, as well as other treatment options were discussed with the patient and any available family today. All questions were answered.     Follow up: As planned above. Today, I personally spent 31 minutes in direct face to face time with the patient, of which greater than 50% of the time was spent in patient education and counseling as described above. Approximately 11 minutes were spent on indirect care associated with the patient's consultation including but not limited to review of: patient medical records to date, clinic visits, hospital records, lab results, imaging studies, procedural documentation, and coordinating care with other providers. The findings from this review are summarized in the above note. All of the above accounted for a cumulative time of 42 minutes and was performed on the date of service.     The patient verbalized understanding of the plan and was appreciative for the time spent and information provided during the office visit.         Documentation assisted by voice recognition and documentation system.        Katalina Shahid PA-C

## 2023-10-08 PROBLEM — M79.644 PAIN OF FINGER OF RIGHT HAND: Status: RESOLVED | Noted: 2023-04-20 | Resolved: 2023-10-08

## 2023-10-08 PROBLEM — Z47.89 ORTHOPEDIC AFTERCARE: Status: RESOLVED | Noted: 2023-04-20 | Resolved: 2023-10-08

## 2023-10-25 ENCOUNTER — PATIENT OUTREACH (OUTPATIENT)
Dept: ONCOLOGY | Facility: CLINIC | Age: 43
End: 2023-10-25
Payer: COMMERCIAL

## 2023-10-25 NOTE — PROGRESS NOTES
[facility]: Cancer Care                                                                                          Called to follow up on patient's weight loss journey since seeing Dr. Anderson.   She reports that she has iron-anemia and was getting iron infusions to help with fatigue and low iron   She is following up with her PCP and may start her weight-loss pill  She is feeling better and will start working out at the gym  Reinstated a dietician appt  Will follow up with her after thanksgiving.     Signature:  Judith Montemayor RN

## 2023-10-26 ENCOUNTER — LAB (OUTPATIENT)
Dept: LAB | Facility: CLINIC | Age: 43
End: 2023-10-26
Attending: NURSE PRACTITIONER
Payer: COMMERCIAL

## 2023-10-26 DIAGNOSIS — D50.9 IRON DEFICIENCY ANEMIA, UNSPECIFIED IRON DEFICIENCY ANEMIA TYPE: ICD-10-CM

## 2023-10-26 LAB
ERYTHROCYTE [DISTWIDTH] IN BLOOD BY AUTOMATED COUNT: 20.2 % (ref 10–15)
FERRITIN SERPL-MCNC: 16 NG/ML (ref 6–175)
HCT VFR BLD AUTO: 42.4 % (ref 35–47)
HGB BLD-MCNC: 13.3 G/DL (ref 11.7–15.7)
IRON BINDING CAPACITY (ROCHE): 283 UG/DL (ref 240–430)
IRON SATN MFR SERPL: 13 % (ref 15–46)
IRON SERPL-MCNC: 38 UG/DL (ref 37–145)
MCH RBC QN AUTO: 25.9 PG (ref 26.5–33)
MCHC RBC AUTO-ENTMCNC: 31.4 G/DL (ref 31.5–36.5)
MCV RBC AUTO: 83 FL (ref 78–100)
PLATELET # BLD AUTO: 377 10E3/UL (ref 150–450)
RBC # BLD AUTO: 5.14 10E6/UL (ref 3.8–5.2)
WBC # BLD AUTO: 11 10E3/UL (ref 4–11)

## 2023-10-26 PROCEDURE — 82728 ASSAY OF FERRITIN: CPT

## 2023-10-26 PROCEDURE — 83550 IRON BINDING TEST: CPT

## 2023-10-26 PROCEDURE — 36415 COLL VENOUS BLD VENIPUNCTURE: CPT

## 2023-10-26 PROCEDURE — 85027 COMPLETE CBC AUTOMATED: CPT

## 2023-10-26 PROCEDURE — 83540 ASSAY OF IRON: CPT

## 2023-11-05 ASSESSMENT — PATIENT HEALTH QUESTIONNAIRE - PHQ9
SUM OF ALL RESPONSES TO PHQ QUESTIONS 1-9: 1
10. IF YOU CHECKED OFF ANY PROBLEMS, HOW DIFFICULT HAVE THESE PROBLEMS MADE IT FOR YOU TO DO YOUR WORK, TAKE CARE OF THINGS AT HOME, OR GET ALONG WITH OTHER PEOPLE: NOT DIFFICULT AT ALL
SUM OF ALL RESPONSES TO PHQ QUESTIONS 1-9: 1

## 2023-11-06 ENCOUNTER — OFFICE VISIT (OUTPATIENT)
Dept: FAMILY MEDICINE | Facility: CLINIC | Age: 43
End: 2023-11-06
Attending: NURSE PRACTITIONER
Payer: COMMERCIAL

## 2023-11-06 VITALS
RESPIRATION RATE: 15 BRPM | OXYGEN SATURATION: 98 % | WEIGHT: 224 LBS | BODY MASS INDEX: 37.28 KG/M2 | SYSTOLIC BLOOD PRESSURE: 125 MMHG | TEMPERATURE: 97.1 F | HEART RATE: 103 BPM | DIASTOLIC BLOOD PRESSURE: 83 MMHG

## 2023-11-06 DIAGNOSIS — K21.00 HIATAL HERNIA WITH GASTROESOPHAGEAL REFLUX DISEASE AND ESOPHAGITIS: ICD-10-CM

## 2023-11-06 DIAGNOSIS — D50.9 IRON DEFICIENCY ANEMIA, UNSPECIFIED IRON DEFICIENCY ANEMIA TYPE: ICD-10-CM

## 2023-11-06 DIAGNOSIS — R53.83 OTHER FATIGUE: ICD-10-CM

## 2023-11-06 DIAGNOSIS — E66.812 CLASS 2 SEVERE OBESITY DUE TO EXCESS CALORIES WITH SERIOUS COMORBIDITY AND BODY MASS INDEX (BMI) OF 36.0 TO 36.9 IN ADULT (H): Primary | ICD-10-CM

## 2023-11-06 DIAGNOSIS — K44.9 HIATAL HERNIA WITH GASTROESOPHAGEAL REFLUX DISEASE AND ESOPHAGITIS: ICD-10-CM

## 2023-11-06 DIAGNOSIS — E66.01 CLASS 2 SEVERE OBESITY DUE TO EXCESS CALORIES WITH SERIOUS COMORBIDITY AND BODY MASS INDEX (BMI) OF 36.0 TO 36.9 IN ADULT (H): Primary | ICD-10-CM

## 2023-11-06 PROBLEM — F14.10 DRUG ABUSE, COCAINE TYPE (H): Status: RESOLVED | Noted: 2020-04-22 | Resolved: 2023-11-06

## 2023-11-06 PROBLEM — S02.401A: Status: RESOLVED | Noted: 2020-04-22 | Resolved: 2023-11-06

## 2023-11-06 PROCEDURE — 99214 OFFICE O/P EST MOD 30 MIN: CPT | Performed by: NURSE PRACTITIONER

## 2023-11-06 RX ORDER — PHENTERMINE HYDROCHLORIDE 15 MG/1
15 CAPSULE ORAL EVERY MORNING
Qty: 30 CAPSULE | Refills: 0 | Status: SHIPPED | OUTPATIENT
Start: 2023-11-06 | End: 2023-12-06

## 2023-11-06 ASSESSMENT — PAIN SCALES - GENERAL: PAINLEVEL: NO PAIN (0)

## 2023-11-06 NOTE — PROGRESS NOTES
"  Assessment & Plan     Class 2 severe obesity due to excess calories with serious comorbidity and body mass index (BMI) of 36.0 to 36.9 in adult (H)  Requires ~20 lb weight loss for surgical intervention for H. Hernia; weight stable since last visit 2/2 fatigue did not take appetite suppressants; diet high in carbohydrates; scheduled with dietitian; will reduce phentermine 37.5 to 15 mg daily for 1 month; increase it tolerable; restart topriamate 25 mg taper before dinner  - phentermine (ADIPEX-P) 15 MG capsule  Dispense: 30 capsule; Refill: 0  - follow up in 1 month    Hiatal hernia with gastroesophageal reflux disease and esophagitis  Other fatigue  Iron deficiency anemia, unspecified iron deficiency anemia type  Following GI; last visit 10/5; pending hernia repair w/20 lb weight loss; EGD/colonoscopy wnl; completed iron infusion x 3 h/h improved   Hematocrit   Date Value Ref Range Status   10/26/2023 42.4 35.0 - 47.0 % Final     Hemoglobin   Date Value Ref Range Status   10/26/2023 13.3 11.7 - 15.7 g/dL Final   07/18/2023 8.6 (L) 11.7 - 15.7 g/dL Final   Fatigue improved; suspect anemia r/t to GI; no abnormal uterine bleed; menorrhagia; fibroids, B12/folate wnl; will monitor in 1 month  - CBC with platelets  - Ferritin                 BMI:   Estimated body mass index is 37.28 kg/m  as calculated from the following:    Height as of 10/5/23: 1.651 m (5' 5\").    Weight as of this encounter: 101.6 kg (224 lb).           MATILDA Davis [facility]    Lola Son is a 43 year old, presenting for the following health issues:  RECHECK (Follow Up )        11/6/2023    11:00 AM   Additional Questions   Roomed by Judi Batista       History of Present Illness       Reason for visit:  Iron transfusion follow up    She eats 0-1 servings of fruits and vegetables daily.She consumes 5 sweetened beverage(s) daily.She exercises with enough effort to increase her heart rate 9 or less " minutes per day.  She exercises with enough effort to increase her heart rate 7 days per week.   She is taking medications regularly.     - weight management for surgery   - carmelo: no bleeding ulceration seen on EGD; colonscoply normal; period regular light bleeding 2 bleed days             Review of Systems         Objective    /83 (BP Location: Right arm, Patient Position: Sitting, Cuff Size: Adult Large)   Pulse 103   Temp 97.1  F (36.2  C) (Temporal)   Resp 15   Wt 101.6 kg (224 lb)   SpO2 98%   BMI 37.28 kg/m    Body mass index is 37.28 kg/m .  Physical Exam

## 2023-11-06 NOTE — COMMUNITY RESOURCES LIST (ENGLISH)
11/06/2023   [facility]  N/A  For questions about this resource list or additional care needs, please contact your primary care clinic or care manager.  Phone: [phone]   Email: N/A   Address: [address] [place] [address]   Hours: N/A        Food and Nutrition       Food pantry  1  [employer] [facility] Distance: 2.2 miles      In-Person, Delivery, Pickup   [address] [place] [address]  Language: English, Luxembourger  Hours: Mon - Fri 8:45 AM - 11:30 AM , Mon - Fri 1:00 PM - 3:30 PM  Fees: Free   Phone: (796) 884-3849 Email: info@ANDA Networksmn.[employer] Website: http://www.Perfect Market     2  [facility] Food ORVIBO Distance: 2.24 miles      Pickup   [address] [place] [address]  Language: Welsh, English, Hmong, Jeny, Luxembourger  Hours: Mon 1:00 PM - 4:00 PM , Mon 5:00 PM - 6:30 PM , Tue - Fri 9:00 AM - 11:30 AM , Tue - Fri 1:00 PM - 3:30 PM  Fees: Free   Phone: (136) 936-6917 Website: https://[facility] Osteopathic HospitalClearSlide/     SNAP application assistance  3  [facility] Outreach Distance: 2.24 miles      Phone/Virtual   [address] [place] [address]  Language: Welsh, English, Hmong, Jeny, Luxembourger  Hours: Mon - Fri 10:00 AM - 12:00 PM , Mon - Fri 2:00 PM - 4:00 PM  Fees: Free   Phone: (311) 550-5885 Website: https://Skill-Life/     4  [facility] of Human [facility] - MNFoodHelper (SNAP) Distance: 3.82 miles      Phone/Virtual   [address] [address] [place] [address]  Language: English, Hmong, Serbian, Austrian, Luxembourger, Mozambican  Hours: Mon - Fri 9:00 AM - 5:00 PM  Fees: Free   Phone: (510) 605-5649 Website: https://[place].gov/dhs/people-we-serve/adults/economic-assistance/food-nutrition/programs-and-services/supplemental-nutrition-assistance-program.jsp     Soup kitchen or free meals  5  [facility] - Take 'n Bake Meals Distance: 2.01 miles      [facility]   [address] [place] [address]  Language: English  Hours: Mon 3:00 PM - 8:00 PM , Tue - Fri 5:00 AM - 8:00 PM , Sat 7:00 AM - 2:00 PM  Fees: Free   Phone: (967) 849-8195 Website: https://communityed.[facility]'s HospitalSuper Derivatives/     6  [facility] Office - Loaves and [facility] Distance: 2.22 miles      [address] [address]  Language: English  Hours: Mon - Fri 5:00 PM - 6:00 PM  Fees: Free   Phone: (350) 612-1346 Email: husam@818 Sports & EntertainmentLenox Hill HospitalMach 1 Development.org Website: http://www.818 Sports & EntertainmentJohn R. Oishei Children's [facility].org/          Important Numbers & Websites       Emergency Services   911  [facility] Services   311  Poison Control   (701) 514-7362  Suicide Prevention Lifeline   (679) 605-5500 (TALK)  Child Abuse Hotline   (477) 413-2361 (4-A-Child)  Sexual Assault Hotline   (189) 604-3058 (HOPE)  National Runaway Safeline   (531) 945-6588 (RUNAWAY)  All-Options Talkline   (514) 685-6962  Substance Abuse Referral   (190) 172-8857 (HELP)

## 2023-11-06 NOTE — PATIENT INSTRUCTIONS
-calories/carbohydrates/protein  - reduce caloric intake 1800 per day;  - cho 100 gms per day  = protein 25-30 gm per meal    Phentermine 15 mg daily for 1 month  Topriamate take 1 hour before dinner   1 tablet  for 1 week then 2 tablets for 1 week  then 3 tablets

## 2023-11-06 NOTE — COMMUNITY RESOURCES LIST (ENGLISH)
11/06/2023   [facility]  N/A  For questions about this resource list or additional care needs, please contact your primary care clinic or care manager.  Phone: [phone]   Email: N/A   Address: [address] [place] [address]   Hours: N/A        Food and Nutrition       Food pantry  1  [employer] [facility] Distance: 2.2 miles      In-Person, Delivery, Pickup   [address] [place] [address]  Language: English, Burkinan  Hours: Mon - Fri 8:45 AM - 11:30 AM , Mon - Fri 1:00 PM - 3:30 PM  Fees: Free   Phone: (138) 404-3088 Email: info@Predixion Softwaremn.Attolight Website: http://www.Lucid Holdings     2  [facility] Food Over 40 Females Distance: 2.24 miles      Pickup   [address] [place] [address]  Language: French, English, Hmong, Jeny, Burkinan  Hours: Mon 1:00 PM - 4:00 PM , Mon 5:00 PM - 6:30 PM , Tue - Fri 9:00 AM - 11:30 AM , Tue - Fri 1:00 PM - 3:30 PM  Fees: Free   Phone: (601) 350-5518 Website: https://[facility] General HospitalTroux Technologies/     SNAP application assistance  3  [facility] Outreach Distance: 2.24 miles      Phone/Virtual   [address] [place] [address]  Language: French, English, Hmong, Jeny, Burkinan  Hours: Mon - Fri 10:00 AM - 12:00 PM , Mon - Fri 2:00 PM - 4:00 PM  Fees: Free   Phone: (935) 709-7867 Website: https://33Across/     4  [facility] of [facility] - MNFoodHelper (SNAP) Distance: 3.82 miles      Phone/Virtual   [address] [address] [place] [address]  Language: English, Hmong, Paraguayan, Lithuanian, Burkinan, Palestinian  Hours: Mon - Fri 9:00 AM - 5:00 PM  Fees: Free   Phone: (121) 152-4416 Website: https://[place].gov/dhs/people-we-serve/adults/economic-assistance/food-nutrition/programs-and-services/supplemental-nutrition-assistance-program.jsp     Soup kitchen or free meals  5  [facility] - Take 'n Bake Meals Distance: 2.01 miles      [facility]   [address] [place] [address]  Language: English  Hours: Mon 3:00 PM - 8:00 PM , Tue - Fri 5:00 AM - 8:00 PM , Sat 7:00 AM - 2:00 PM  Fees: Free   Phone: (143) 990-1570 Website: https://communityed.Lists [facility] hospitals in [facility] StatesM[facility] Health/     6  [facility] Office - [facility] and [facility] Distance: 2.22 miles      [address] [address]  Language: English  Hours: Mon - Fri 5:00 PM - 6:00 PM  Fees: Free   Phone: (472) 771-2662 Email: husam@VMIX MediaWestchester Square Medical CenterImaginova.org Website: http://www.VMIX MediaGouverneur Health.org/          Important Numbers & Websites       Emergency Services   911  [facility] Services   311  Poison Control   (279) 714-7801  Suicide Prevention Lifeline   (440) 781-8145 (TALK)  Child Abuse Hotline   (879) 351-7441 (4-A-Child)  Sexual Assault Hotline   (750) 402-6277 (HOPE)  National Runaway Safeline   (216) 524-9512 (RUNAWAY)  All-Options Talkline   (453) 436-5120  Substance Abuse Referral   (665) 420-1975 (HELP)

## 2023-11-06 NOTE — COMMUNITY RESOURCES LIST (ENGLISH)
11/06/2023   [facility]  N/A  For questions about this resource list or additional care needs, please contact your primary care clinic or care manager.  Phone: [phone]   Email: N/A   Address: [address] [place] [address]   Hours: N/A        Food and Nutrition       Food pantry  1  [employer] [facility] Distance: 2.2 miles      In-Person, Delivery, Pickup   [address] [place] [address]  Language: English, Faroese  Hours: Mon - Fri 8:45 AM - 11:30 AM , Mon - Fri 1:00 PM - 3:30 PM  Fees: Free   Phone: (903) 273-9430 Email: info@Atira Systemsmn.Heroku Website: http://www.Regalos Y Amigos     2  [facility] Food [employer] Distance: 2.24 miles      Pickup   [address] [place] [address]  Language: Greenlandic, English, Hmong, Jeny, Faroese  Hours: Mon 1:00 PM - 4:00 PM , Mon 5:00 PM - 6:30 PM , Tue - Fri 9:00 AM - 11:30 AM , Tue - Fri 1:00 PM - 3:30 PM  Fees: Free   Phone: (296) 693-5672 Website: https://[facility] Memorial HospitalLearn with Homer/     SNAP application assistance  3  [facility] Outreach Distance: 2.24 miles      Phone/Virtual   [address] [place] [address]  Language: Greenlandic, English, Hmong, Jeny, Faroese  Hours: Mon - Fri 10:00 AM - 12:00 PM , Mon - Fri 2:00 PM - 4:00 PM  Fees: Free   Phone: (860) 479-5817 Website: https://MiniLuxe/     4  [facility] of [facility] - MNFoodHelper (SNAP) Distance: 3.82 miles      Phone/Virtual   [address] [address] [place] [address]  Language: English, Hmong, Argentine, Australian, Faroese, Indian  Hours: Mon - Fri 9:00 AM - 5:00 PM  Fees: Free   Phone: (797) 810-9099 Website: https://[place].gov/dhs/people-we-serve/adults/economic-assistance/food-nutrition/programs-and-services/supplemental-nutrition-assistance-program.jsp     Soup kitchen or free meals  5  [facility] - Take 'n Bake Meals Distance: 2.01 miles      [facility]   [address] [place] [address]  Language: English  Hours: Mon 3:00 PM - 8:00 PM , Tue - Fri 5:00 AM - 8:00 PM , Sat 7:00 AM - 2:00 PM  Fees: Free   Phone: (593) 463-5093 Website: https://communityed.[facility] HospitalGeo Semiconductor/     6  [facility] Office - [place] and [facility] Distance: 2.22 miles      [address] [address]  Language: English  Hours: Mon - Fri 5:00 PM - 6:00 PM  Fees: Free   Phone: (370) 980-3884 Email: husam@Trig MedicalRockefeller War Demonstration HospitalFireEye.org Website: http://www.[facility] MedicalJames [facility].org/          Important Numbers & Websites       Emergency Services   911  [facility] Services   311  Poison Control   (815) 881-5050  Suicide Prevention Lifeline   (300) 613-6177 (TALK)  Child Abuse Hotline   (875) 942-1243 (4-A-Child)  Sexual Assault Hotline   (104) 114-1743 (HOPE)  National Runaway Safeline   (351) 477-2691 (RUNAWAY)  All-Options Talkline   (530) 979-2998  Substance Abuse Referral   (882) 869-1934 (HELP)

## 2023-11-20 ENCOUNTER — VIRTUAL VISIT (OUTPATIENT)
Dept: SURGERY | Facility: CLINIC | Age: 43
End: 2023-11-20
Payer: COMMERCIAL

## 2023-11-20 DIAGNOSIS — E66.01 CLASS 2 SEVERE OBESITY DUE TO EXCESS CALORIES WITH SERIOUS COMORBIDITY AND BODY MASS INDEX (BMI) OF 36.0 TO 36.9 IN ADULT (H): Primary | ICD-10-CM

## 2023-11-20 DIAGNOSIS — K21.00 HIATAL HERNIA WITH GASTROESOPHAGEAL REFLUX DISEASE AND ESOPHAGITIS: ICD-10-CM

## 2023-11-20 DIAGNOSIS — K44.9 HIATAL HERNIA WITH GASTROESOPHAGEAL REFLUX DISEASE AND ESOPHAGITIS: ICD-10-CM

## 2023-11-20 DIAGNOSIS — Z71.3 NUTRITIONAL COUNSELING: ICD-10-CM

## 2023-11-20 DIAGNOSIS — E66.812 CLASS 2 SEVERE OBESITY DUE TO EXCESS CALORIES WITH SERIOUS COMORBIDITY AND BODY MASS INDEX (BMI) OF 36.0 TO 36.9 IN ADULT (H): Primary | ICD-10-CM

## 2023-11-20 PROCEDURE — 97802 MEDICAL NUTRITION INDIV IN: CPT | Mod: VID | Performed by: DIETITIAN, REGISTERED

## 2023-11-20 NOTE — PROGRESS NOTES
"Huy Coles is a 43 year old who is being evaluated via a billable video visit.      How would you like to obtain your AVS? MyChart  If the video visit is dropped, the invitation should be resent by: Send to e-mail at: [email or web address] [employer]EX Health  Will anyone else be joining your video visit? No          Medical Weight Loss Initial Diet Evaluation  Assessment:  This patient was referred by Dr. Anderson  for MNT as treatment for Obesity.       Huy is presenting today for a new weight management nutrition consultation.     Weight loss medication: Phentermine. Topamax-just restarted       Anthropometrics:    Initial weight: 220 lbs     BMI: There is no height or weight on file to calculate BMI.   Ideal body weight: 57 kg (125 lb 10.6 oz)  Adjusted ideal body weight: 74.8 kg (165 lb)    Estimated RMR ([place]-St Jeor equation):  1662 kcals x 1.2 (sedentary) = 1994 kcals (for weight maintenance)      Recommended Protein Intake: 60-80 grams of protein/day    Medical History:  Patient Active Problem List   Diagnosis    Abnormality of heart beat    Anxiety disorder    Closed fracture of nasal bones    Fatigue    Finger deformity, acquired, right    Hair loss    Hiatal hernia with gastroesophageal reflux disease and esophagitis    Major depressive disorder    Vitamin D deficiency    Iron deficiency anemia, unspecified iron deficiency anemia type    Esophagitis determined by endoscopy      Diabetes: No   HbA1c:  No results found for: \"HGBA1C\"    Nutrition History:   Food allergies/intolerances/cultural or religous food customs: No however has food aversions to certain meat, vegetables, and fruit that she has dealt with most of her life    Vitamins/Mineral Supplementation: MVI one a day, Vitamin D, Fish Oil (on occasion)     Dietary Recall:  Breakfast: cereal or cinnamon roll or muffin   Lunch: scrambled eggs with cheese   Dinner: pasta or pizza (typically cheese)  Typical Snacks: occasional potato chips or scrambled eggs "   Overnight eating: No  Eating out: more so delivery-2 times/week     Beverages: pop-Dr Pepper, Propel, minimal water-not as satisfying    Exercise: no set regimen-however does use the stairs quite often-lives on the 3rd floor, does have a gym in her building    Nutrition Diagnosis (PES statement):     Obesity related to excessive energy intake as evidence by subjective statements and BMI of 36.7 kg/m2.        Nutrition Intervention  Food and/or Nutrient Delivery   Placed emphasis on importance of developing a healthy meal routine, aiming for 3 meals a day and no snacks.  Discussed using a protein supplement as a meal replacement.    Nutrition Education   Discussed with patient how to build a meal: the importance of including a lean/low fat protein at each meal, include a source of vegetables at a minimum of lunch and dinner and limiting carbohydrate intake to <25% per meal.  Educated on sources of lean protein, portion sizes, the amount of grams found in each source. Recommend patient to aim for 20-30g protein at each meal.  Educated on how to read a food label: keeping total fat <10g and sugar <10g per serving.  Discussed the importance of adequate hydration, with emphasis on drinking 64oz of water or zero calorie beverages per day.    Nutrition Counseling   Encouraged importance of developing routine exercise for health benefits and weight loss.      Goals established by patient:   Work on reducing pop consumption, replace with Propel instead.   Be consistent with eating 3 meals/day, every 4-6 hours, pairing carbohydrates with protein.   Work on establishing an exercise regimen, start small and slowly build.     Handouts provided:  None    Assessment/Plan:    Pt will follow up in 1 month(s) with dietitian.       Video-Visit Details    Type of service:  Video Visit    Video Start Time (time video started): 1:48 pm     Video End Time (time video stopped): 2:13 pm    Originating Location (pt. Location):  Home      Distant Location (provider location):  Off-site    Mode of Communication:  Video Conference via [facility]    Physician has received verbal consent for a Video Visit from the patient? Yes      Valencia Kennedy, RD

## 2023-11-20 NOTE — LETTER
"    11/20/2023         RE: Huy Coles  [address]  [place] [address]        Dear Colleague,    Thank you for referring your patient, Huy Coles, to the [facility] [place]. Please see a copy of my visit note below.    Huy Coles is a 43 year old who is being evaluated via a billable video visit.      How would you like to obtain your AVS? MyChart  If the video visit is dropped, the invitation should be resent by: Send to e-mail at: [email or web address]  Will anyone else be joining your video visit? No          Medical Weight Loss Initial Diet Evaluation  Assessment:  This patient was referred by Dr. Anderson  for MNT as treatment for Obesity.       Huy is presenting today for a new weight management nutrition consultation.     Weight loss medication: Phentermine. Topamax-just restarted       Anthropometrics:    Initial weight: 220 lbs     BMI: There is no height or weight on file to calculate BMI.   Ideal body weight: 57 kg (125 lb 10.6 oz)  Adjusted ideal body weight: 74.8 kg (165 lb)    Estimated RMR (Providence-St Jeor equation):  1662 kcals x 1.2 (sedentary) = 1994 kcals (for weight maintenance)      Recommended Protein Intake: 60-80 grams of protein/day    Medical History:  Patient Active Problem List   Diagnosis     Abnormality of heart beat     Anxiety disorder     Closed fracture of nasal bones     Fatigue     Finger deformity, acquired, right     Hair loss     Hiatal hernia with gastroesophageal reflux disease and esophagitis     Major depressive disorder     Vitamin D deficiency     Iron deficiency anemia, unspecified iron deficiency anemia type     Esophagitis determined by endoscopy      Diabetes: No   HbA1c:  No results found for: \"HGBA1C\"    Nutrition History:   Food allergies/intolerances/cultural or religous food customs: No however has food aversions to certain meat, vegetables, and fruit that she has dealt with most of her " life    Vitamins/Mineral Supplementation: MVI one a day, Vitamin D, Fish Oil (on occasion)     Dietary Recall:  Breakfast: cereal or cinnamon roll or muffin   Lunch: scrambled eggs with cheese   Dinner: pasta or pizza (typically cheese)  Typical Snacks: occasional potato chips or scrambled eggs   Overnight eating: No  Eating out: more so delivery-2 times/week     Beverages: pop-Dr Pepper, Propel, minimal water-not as satisfying    Exercise: no set regimen-however does use the stairs quite often-lives on the 3rd floor, does have a gym in her building    Nutrition Diagnosis (PES statement):     Obesity related to excessive energy intake as evidence by subjective statements and BMI of 36.7 kg/m2.        Nutrition Intervention  Food and/or Nutrient Delivery   Placed emphasis on importance of developing a healthy meal routine, aiming for 3 meals a day and no snacks.  Discussed using a protein supplement as a meal replacement.    Nutrition Education   Discussed with patient how to build a meal: the importance of including a lean/low fat protein at each meal, include a source of vegetables at a minimum of lunch and dinner and limiting carbohydrate intake to <25% per meal.  Educated on sources of lean protein, portion sizes, the amount of grams found in each source. Recommend patient to aim for 20-30g protein at each meal.  Educated on how to read a food label: keeping total fat <10g and sugar <10g per serving.  Discussed the importance of adequate hydration, with emphasis on drinking 64oz of water or zero calorie beverages per day.    Nutrition Counseling   Encouraged importance of developing routine exercise for health benefits and weight loss.      Goals established by patient:   Work on reducing pop consumption, replace with Propel instead.   Be consistent with eating 3 meals/day, every 4-6 hours, pairing carbohydrates with protein.   Work on establishing an exercise regimen, start small and slowly build.     Handouts  provided:  None    Assessment/Plan:    Pt will follow up in 1 month(s) with dietitian.       Video-Visit Details    Type of service:  Video Visit    Video Start Time (time video started): 1:48 pm     Video End Time (time video stopped): 2:13 pm    Originating Location (pt. Location): Home      Distant Location (provider location):  Off-site    Mode of Communication:  Video Conference via [facility]    Physician has received verbal consent for a Video Visit from the patient? Yes      Valencia Kennedy RD            Again, thank you for allowing me to participate in the care of your patient.        Sincerely,        Valencia Kennedy RD

## 2023-12-05 ENCOUNTER — LAB (OUTPATIENT)
Dept: LAB | Facility: CLINIC | Age: 43
End: 2023-12-05
Payer: COMMERCIAL

## 2023-12-05 DIAGNOSIS — D50.9 IRON DEFICIENCY ANEMIA, UNSPECIFIED IRON DEFICIENCY ANEMIA TYPE: ICD-10-CM

## 2023-12-05 DIAGNOSIS — R53.83 OTHER FATIGUE: ICD-10-CM

## 2023-12-05 LAB
ERYTHROCYTE [DISTWIDTH] IN BLOOD BY AUTOMATED COUNT: 16.3 % (ref 10–15)
FERRITIN SERPL-MCNC: 17 NG/ML (ref 6–175)
HCT VFR BLD AUTO: 42.8 % (ref 35–47)
HGB BLD-MCNC: 13.8 G/DL (ref 11.7–15.7)
MCH RBC QN AUTO: 27.4 PG (ref 26.5–33)
MCHC RBC AUTO-ENTMCNC: 32.2 G/DL (ref 31.5–36.5)
MCV RBC AUTO: 85 FL (ref 78–100)
PLATELET # BLD AUTO: 397 10E3/UL (ref 150–450)
RBC # BLD AUTO: 5.03 10E6/UL (ref 3.8–5.2)
WBC # BLD AUTO: 9 10E3/UL (ref 4–11)

## 2023-12-05 PROCEDURE — 82728 ASSAY OF FERRITIN: CPT

## 2023-12-05 PROCEDURE — 85027 COMPLETE CBC AUTOMATED: CPT

## 2023-12-05 PROCEDURE — 83550 IRON BINDING TEST: CPT

## 2023-12-05 PROCEDURE — 36415 COLL VENOUS BLD VENIPUNCTURE: CPT

## 2023-12-05 PROCEDURE — 83540 ASSAY OF IRON: CPT

## 2023-12-06 ENCOUNTER — VIRTUAL VISIT (OUTPATIENT)
Dept: FAMILY MEDICINE | Facility: CLINIC | Age: 43
End: 2023-12-06
Payer: COMMERCIAL

## 2023-12-06 DIAGNOSIS — K44.9 HIATAL HERNIA WITH GASTROESOPHAGEAL REFLUX DISEASE AND ESOPHAGITIS: ICD-10-CM

## 2023-12-06 DIAGNOSIS — D50.9 IRON DEFICIENCY ANEMIA, UNSPECIFIED IRON DEFICIENCY ANEMIA TYPE: ICD-10-CM

## 2023-12-06 DIAGNOSIS — K21.00 HIATAL HERNIA WITH GASTROESOPHAGEAL REFLUX DISEASE AND ESOPHAGITIS: ICD-10-CM

## 2023-12-06 DIAGNOSIS — E66.01 CLASS 2 SEVERE OBESITY DUE TO EXCESS CALORIES WITH SERIOUS COMORBIDITY AND BODY MASS INDEX (BMI) OF 36.0 TO 36.9 IN ADULT (H): Primary | ICD-10-CM

## 2023-12-06 DIAGNOSIS — E66.812 CLASS 2 SEVERE OBESITY DUE TO EXCESS CALORIES WITH SERIOUS COMORBIDITY AND BODY MASS INDEX (BMI) OF 36.0 TO 36.9 IN ADULT (H): Primary | ICD-10-CM

## 2023-12-06 LAB
IRON BINDING CAPACITY (ROCHE): 310 UG/DL (ref 240–430)
IRON SATN MFR SERPL: 7 % (ref 15–46)
IRON SERPL-MCNC: 23 UG/DL (ref 37–145)

## 2023-12-06 PROCEDURE — 99214 OFFICE O/P EST MOD 30 MIN: CPT | Mod: VID | Performed by: NURSE PRACTITIONER

## 2023-12-06 NOTE — PROGRESS NOTES
"Huy is a 43 year old who is being evaluated via a billable video visit.      How would you like to obtain your AVS? MyChart  If the video visit is dropped, the invitation should be resent by: Text to cell phone: [phone]  Will anyone else be joining your video visit? No          Assessment & Plan     Class 2 severe obesity due to excess calories with serious comorbidity and body mass index (BMI) of 36.0 to 36.9 in adult (H)  Will continue dietary changes and excise/activity for now; will discuss medication at next visit    Iron deficiency anemia, unspecified iron deficiency anemia type      Stable; continue current regimen; repeat iron studies  - Iron & Iron Binding Capacity    Hiatal hernia with gastroesophageal reflux disease and esophagitis  Stable; following GenSurg for possible surgical intervention if BMI within range <40  Following GI for GERD management               BMI:   Estimated body mass index is 37.28 kg/m  as calculated from the following:    Height as of 10/5/23: 1.651 m (5' 5\").    Weight as of 11/6/23: 101.6 kg (224 lb).           MATILDA Davis [facility]    Subjective   Huy is a 43 year old, presenting for the following health issues:  Weight Check      12/6/2023     4:00 PM   Additional Questions   Roomed by Stacia WALLACE       History of Present Illness       Reason for visit:  Lab results      - previous visit patient was started on appetite suppressants phentermine and topamax to assist with weight loss; requires BMI <40 for hiatal hernia repair; not taking medication consistently at this time; unsure about surgery at this time  - iron def: complete iron infusion;will recheck levels   Hematocrit   Date Value Ref Range Status   12/05/2023 42.8 35.0 - 47.0 % Final     Hemoglobin   Date Value Ref Range Status   12/05/2023 13.8 11.7 - 15.7 g/dL Final     Ferritin   Date Value Ref Range Status   12/05/2023 17 6 - 175 ng/mL Final     Iron   Date Value Ref " Range Status   12/05/2023 23 (L) 37 - 145 ug/dL Final     Iron Binding Capacity   Date Value Ref Range Status   12/05/2023 310 240 - 430 ug/dL Final         ]    -H. Hernia/GERD: following GI and GenSurg       Review of Systems         Objective    Vitals - Patient Reported  Pain Score: No Pain (0)        Physical Exam   GENERAL: Healthy, alert and no distress  EYES: Eyes grossly normal to inspection.  No discharge or erythema, or obvious scleral/conjunctival abnormalities.  RESP: No audible wheeze, cough, or visible cyanosis.  No visible retractions or increased work of breathing.    SKIN: Visible skin clear. No significant rash, abnormal pigmentation or lesions.  NEURO: Cranial nerves grossly intact.  Mentation and speech appropriate for age.  PSYCH: Mentation appears normal, affect normal/bright, judgement and insight intact, normal speech and appearance well-groomed.                Video-Visit Details    Type of service:  Video Visit     Joined the call at 12/6/2023, 4:41:20 pm.  Left the call at 12/6/2023, 4:50:13 pm.  You were on the call for 8 minutes 52 seconds .    Originating Location (pt. Location): Home    Distant Location (provider location):  Off-site  Platform used for Video Visit: Gaurav

## 2023-12-11 ENCOUNTER — PATIENT OUTREACH (OUTPATIENT)
Dept: ONCOLOGY | Facility: CLINIC | Age: 43
End: 2023-12-11
Payer: COMMERCIAL

## 2023-12-11 NOTE — PROGRESS NOTES
[facility]: Cancer Care                                                                                          Patient had her office visits with wt loss management and dietician.   She is just starting to work towards her 20 lb weight loss but is struggling close to the holidays.   Will continue to follow up with her in the Spring.     Signature:  Judith Montemayor RN

## 2024-01-01 ENCOUNTER — MYC MEDICAL ADVICE (OUTPATIENT)
Dept: FAMILY MEDICINE | Facility: CLINIC | Age: 44
End: 2024-01-01
Payer: COMMERCIAL

## 2024-01-01 DIAGNOSIS — K21.00 HIATAL HERNIA WITH GASTROESOPHAGEAL REFLUX DISEASE AND ESOPHAGITIS: Primary | ICD-10-CM

## 2024-01-01 DIAGNOSIS — K44.9 HIATAL HERNIA WITH GASTROESOPHAGEAL REFLUX DISEASE AND ESOPHAGITIS: Primary | ICD-10-CM

## 2024-01-02 RX ORDER — ESOMEPRAZOLE MAGNESIUM 40 MG/1
40 CAPSULE, DELAYED RELEASE ORAL
Status: CANCELLED | OUTPATIENT
Start: 2024-01-02

## 2024-01-02 NOTE — TELEPHONE ENCOUNTER
Patient has new insurance and states you had said you would send in a prescription for Nexium 40 mg.  Please advise. Order pended. Pharm noted.    Hanh Ortiz, RN, BSN, PHN  [facility]  [phone]

## 2024-01-18 ENCOUNTER — MYC MEDICAL ADVICE (OUTPATIENT)
Dept: FAMILY MEDICINE | Facility: CLINIC | Age: 44
End: 2024-01-18
Payer: COMMERCIAL

## 2024-01-18 DIAGNOSIS — K44.9 HIATAL HERNIA: Primary | ICD-10-CM

## 2024-01-18 DIAGNOSIS — K20.90 ESOPHAGITIS DETERMINED BY ENDOSCOPY: ICD-10-CM

## 2024-01-19 NOTE — TELEPHONE ENCOUNTER
Message sent via [employer].    See [employer] encounter of 1/2/24.    Hanh Ortiz RN, BSN, PHN  [facility]  [phone]

## 2024-01-19 NOTE — TELEPHONE ENCOUNTER
Patient calling with 2nd request.  Order pended. Thank you.    Hanh Ortiz, RN, BSN, PHN  [facility]  [phone]

## 2024-01-23 ENCOUNTER — TELEPHONE (OUTPATIENT)
Dept: FAMILY MEDICINE | Facility: CLINIC | Age: 44
End: 2024-01-23
Payer: COMMERCIAL

## 2024-01-23 RX ORDER — ESOMEPRAZOLE MAGNESIUM 40 MG/1
40 CAPSULE, DELAYED RELEASE ORAL
Qty: 90 CAPSULE | Refills: 3 | Status: SHIPPED | OUTPATIENT
Start: 2024-01-23 | End: 2024-07-11

## 2024-01-23 NOTE — TELEPHONE ENCOUNTER
PA started for esomeprazole (NEXIUM) 40 MG DR capsule.  Log into [email or web address] and enter info from below:    Key: [id number]  Patient last name: MOHIT  : 82

## 2024-02-02 NOTE — TELEPHONE ENCOUNTER
Retail Pharmacy Prior Authorization Team   Phone: [phone]    Prior Authorization Not Needed per Insurance    Medication: ESOMEPRAZOLE MAGNESIUM 40 MG PO CPDR  Insurance Company:    Expected CoPay: $    Pharmacy Filling the Rx: [facility] [address] [place] [address]  Pharmacy Notified: YES  Patient Notified: Pharmacy will notify patient once order is ready.

## 2024-02-07 ENCOUNTER — TELEPHONE (OUTPATIENT)
Dept: FAMILY MEDICINE | Facility: CLINIC | Age: 44
End: 2024-02-07
Payer: COMMERCIAL

## 2024-02-07 NOTE — TELEPHONE ENCOUNTER
Forms/Letter Request    Type of form/letter: OTHER: Medical       Do we have the form/letter: Yes:   Completed in outgoing bin CARE TEAM 1  Attached med list  Copy sent to abstract  Copy kept in clinic   Faxed to [phone]    Who is the form from? [employer] (if other please explain)    Where did/will the form come from? form was faxed in

## 2024-02-11 ENCOUNTER — OFFICE VISIT (OUTPATIENT)
Dept: FAMILY MEDICINE | Facility: CLINIC | Age: 44
End: 2024-02-11
Payer: COMMERCIAL

## 2024-02-11 ENCOUNTER — ANCILLARY PROCEDURE (OUTPATIENT)
Dept: GENERAL RADIOLOGY | Facility: CLINIC | Age: 44
End: 2024-02-11
Attending: PHYSICIAN ASSISTANT
Payer: COMMERCIAL

## 2024-02-11 VITALS
RESPIRATION RATE: 16 BRPM | DIASTOLIC BLOOD PRESSURE: 75 MMHG | HEART RATE: 68 BPM | OXYGEN SATURATION: 98 % | SYSTOLIC BLOOD PRESSURE: 113 MMHG | TEMPERATURE: 97.7 F

## 2024-02-11 DIAGNOSIS — M25.551 BILATERAL HIP PAIN: Primary | ICD-10-CM

## 2024-02-11 DIAGNOSIS — M25.552 BILATERAL HIP PAIN: Primary | ICD-10-CM

## 2024-02-11 PROCEDURE — 99214 OFFICE O/P EST MOD 30 MIN: CPT | Performed by: PHYSICIAN ASSISTANT

## 2024-02-11 PROCEDURE — 73521 X-RAY EXAM HIPS BI 2 VIEWS: CPT | Mod: TC | Performed by: RADIOLOGY

## 2024-02-11 NOTE — PATIENT INSTRUCTIONS
You were seen today for a muscle strain.    Symptom management:  - May use acetaminophen 500-1000 mg for first 3 hours (not to exceed 4000 mg in one day), then ibuprofen 400-600 mg with food for the next 3 hours, and alternate as needed  - Heat pads as tolerated  - Rest with occasional light stretching    Reasons to be seen immediately in the emergency room:  - Develop numbness or tingling in the groin or legs  - Sharp shooting pain down to your legs  - Loss of bowel or bladder function    Otherwise, if pain is not improving after 1 week, return for re-evaluation or see your primary care provider.

## 2024-02-11 NOTE — PROGRESS NOTES
Assessment & Plan:      Problem List Items Addressed This Visit    None  Visit Diagnoses       Bilateral hip pain    -  Primary    Relevant Orders    Physical Therapy Referral    XR Hip G/E 2 Views Bilateral (Completed)          Medical Decision Making  Patient presents with ongoing bilateral hip and leg pains for 3 weeks.  X-rays negative for signs of osteoarthritis or other acute fracture.  Symptoms most consistent with muscle strains.  Recommend warm compresses, over-the-counter analgesics, and trial of physical therapy.  No signs of radiculopathy.  Discussed treatment and symptomatic care.  Allergies and medication interactions reviewed.  Discussed signs of worsening symptoms and when to follow-up with PCP if no symptom improvement.    30 minutes spent in total for charting, chart review, patient examination, and discussion with patient on labs, counseling, and coordination of care as listed above.     Subjective:      Huy Coles is a 43 year old female here for evaluation of bilateral hip pains.  Onset of symptoms was 3 weeks ago.  Patient's first were initiated after long car ride.  Patient has had similar symptoms in the past that resolved on its own.  Pains are relieved when patient is standing with worse pains during movement such as going up stairs or going from sitting to standing.     The following portions of the patient's history were reviewed and updated as appropriate: allergies, current medications, and problem list.     Review of Systems  Pertinent items are noted in HPI.    Allergies  Allergies   Allergen Reactions    Azithromycin Hives    Codeine Other (See Comments)     Drowsiness, severe    Tramadol Other (See Comments)     Confusion    Cefaclor Rash    Sulfa Antibiotics Hives and Rash       Family History   Problem Relation Age of Onset    Depression Mother     Mental Illness Mother     Substance Abuse Mother     Osteoporosis Mother     Osteoporosis Maternal Grandmother     Colon Cancer  Paternal Grandmother     Depression Son     Anxiety Disorder Son        Social History     Tobacco Use    Smoking status: Never    Smokeless tobacco: Never   Substance Use Topics    Alcohol use: Yes     Comment: occassional        Objective:      /75   Pulse 68   Temp 97.7  F (36.5  C) (Oral)   Resp 16   SpO2 98%   General appearance - alert, well appearing, and in no distress and non-toxic  Back exam - no midline spinal tenderness, slight tenderness to the gluteus lucinda muscles bilaterally, worse on left compared to right  Extremities - slight tenderness to palpation of the hip joint bilaterally, otherwise full range of motion of joint without significant difficulty, patient is ambulating without significant difficulty     Lab & Imaging Results    Results for orders placed or performed in visit on 02/11/24   XR Hip G/E 2 Views Bilateral     Status: None    Narrative    EXAM: XR HIP BILATERAL 2 VIEWS EACH  LOCATION: [facility]  DATE: 2/11/2024    INDICATION:  Bilateral hip pain, Bilateral hip pain  COMPARISON: None.      Impression    IMPRESSION: Normal joint spaces and alignment. No acute fracture.       I personally reviewed these results and discussed findings with the patient.    The use of Dragon/Second[employer] dictation services was used to construct the content of this note; any grammatical errors are non-intentional. Please contact the author directly if you are in need of any clarification.

## 2024-02-14 ASSESSMENT — ACTIVITIES OF DAILY LIVING (ADL)
SITTING FOR 15 MINUTES: MODERATE DIFFICULTY
HOS_ADL_SCORE(%): 66.18
LIGHT_TO_MODERATE_WORK: SLIGHT DIFFICULTY
PUTTING ON SOCKS AND SHOES: SLIGHT DIFFICULTY
STEPPING UP AND DOWN CURBS: SLIGHT DIFFICULTY
WALKING_15_MINUTES_OR_GREATER: SLIGHT DIFFICULTY
HOS_ADL_ITEM_SCORE_TOTAL: 45
DEEP SQUATTING: EXTREME DIFFICULTY
WALKING_APPROXIMATELY_10_MINUTES: SLIGHT DIFFICULTY
HOW_WOULD_YOU_RATE_YOUR_CURRENT_LEVEL_OF_FUNCTION_DURING_YOUR_USUAL_ACTIVITIES_OF_DAILY_LIVING_FROM_0_TO_100_WITH_100_BEING_YOUR_LEVEL_OF_FUNCTION_PRIOR_TO_YOUR_HIP_PROBLEM_AND_0_BEING_THE_INABILITY_TO_PERFORM_ANY_OF_YOUR_USUAL_DAILY_ACTIVITIES?: 70
GETTING INTO AND OUT OF AN AVERAGE CAR: MODERATE DIFFICULTY
RECREATIONAL ACTIVITIES: SLIGHT DIFFICULTY
GOING DOWN 1 FLIGHT OF STAIRS: SLIGHT DIFFICULTY
TWISTING/PIVOTING ON INVOLVED LEG: SLIGHT DIFFICULTY
GETTING_INTO_AND_OUT_OF_A_BATHTUB: MODERATE DIFFICULTY
GOING UP 1 FLIGHT OF STAIRS: SLIGHT DIFFICULTY
WALKING_DOWN_STEEP_HILLS: SLIGHT DIFFICULTY
WALKING_INITIALLY: EXTREME DIFFICULTY
STANDING FOR 15 MINUTES: NO DIFFICULTY AT ALL
HOS_ADL_HIGHEST_POTENTIAL_SCORE: 68
HEAVY_WORK: SLIGHT DIFFICULTY
ROLLING OVER IN BED: MODERATE DIFFICULTY
WALKING_UP_STEEP_HILLS: SLIGHT DIFFICULTY

## 2024-02-20 ENCOUNTER — THERAPY VISIT (OUTPATIENT)
Dept: PHYSICAL THERAPY | Facility: REHABILITATION | Age: 44
End: 2024-02-20
Attending: PHYSICIAN ASSISTANT
Payer: COMMERCIAL

## 2024-02-20 DIAGNOSIS — M25.551 BILATERAL HIP PAIN: ICD-10-CM

## 2024-02-20 DIAGNOSIS — M25.552 BILATERAL HIP PAIN: ICD-10-CM

## 2024-02-20 PROCEDURE — 97110 THERAPEUTIC EXERCISES: CPT | Mod: GP | Performed by: PHYSICAL THERAPIST

## 2024-02-20 PROCEDURE — 97162 PT EVAL MOD COMPLEX 30 MIN: CPT | Mod: GP | Performed by: PHYSICAL THERAPIST

## 2024-02-20 ASSESSMENT — ACTIVITIES OF DAILY LIVING (ADL)
DEEP_SQUATTING: EXTREME DIFFICULTY
SPORTS_SCORE(%): 0
ADL_COUNT: 17
WALKING_INITIALLY: EXTREME DIFFICULTY
WALKING_DOWN_STEEP_HILLS: SLIGHT DIFFICULTY
HEAVY_WORK: SLIGHT DIFFICULTY
GOING DOWN 1 FLIGHT OF STAIRS: SLIGHT DIFFICULTY
STANDING FOR 15 MINUTES: NO DIFFICULTY AT ALL
WALKING_INITIALLY: EXTREME DIFFICULTY
STEPPING_UP_AND_DOWN_CURBS: SLIGHT DIFFICULTY
SITTING_FOR_15_MINUTES: MODERATE DIFFICULTY
TWISTING/PIVOTING_ON_INVOLVED_LEG: SLIGHT DIFFICULTY
STEPPING UP AND DOWN CURBS: SLIGHT DIFFICULTY
WALKING_15_MINUTES_OR_GREATER: SLIGHT DIFFICULTY
RECREATIONAL_ACTIVITIES: SLIGHT DIFFICULTY
SWINGING_OBJECTS_LIKE_A_GOLF_CLUB: SLIGHT DIFFICULTY
GETTING_INTO_AND_OUT_OF_AN_AVERAGE_CAR: MODERATE DIFFICULTY
GOING UP 1 FLIGHT OF STAIRS: SLIGHT DIFFICULTY
WALKING_APPROXIMATELY_10_MINUTES: SLIGHT DIFFICULTY
ADL_TOTAL_ITEM_SCORE: 0
LIGHT_TO_MODERATE_WORK: SLIGHT DIFFICULTY
WALKING_FOR_APPROXIMATELY_10_MINUTES: SLIGHT DIFFICULTY
LANDING: SLIGHT DIFFICULTY
GETTING INTO AND OUT OF AN AVERAGE CAR: MODERATE DIFFICULTY
HEAVY_WORK: SLIGHT DIFFICULTY
HOS_ADL_ITEM_SCORE_TOTAL: 45
LIGHT_TO_MODERATE_WORK: SLIGHT DIFFICULTY
SPORTS_HIGHEST_POTENTIAL_SCORE: 36
SPORTS_TOTAL_ITEM_SCORE: 0
STARTING_AND_STOPPING_QUICKLY: SLIGHT DIFFICULTY
WALKING_UP_STEEP_HILLS: SLIGHT DIFFICULTY
GOING_UP_1_FLIGHT_OF_STAIRS: SLIGHT DIFFICULTY
STANDING_FOR_15_MINUTES: NO DIFFICULTY AT ALL
ADL_HIGHEST_POTENTIAL_SCORE: 68
HOW_WOULD_YOU_RATE_YOUR_CURRENT_LEVEL_OF_FUNCTION_DURING_YOUR_USUAL_ACTIVITIES_OF_DAILY_LIVING_FROM_0_TO_100_WITH_100_BEING_YOUR_LEVEL_OF_FUNCTION_PRIOR_TO_YOUR_HIP_PROBLEM_AND_0_BEING_THE_INABILITY_TO_PERFORM_ANY_OF_YOUR_USUAL_DAILY_ACTIVITIES?: 70
HOW_WOULD_YOU_RATE_YOUR_CURRENT_LEVEL_OF_FUNCTION_DURING_YOUR_SPORTS_RELATED_ACTIVITIES_FROM_0_TO_100_WITH_100_BEING_YOUR_LEVEL_OF_FUNCTION_PRIOR_TO_YOUR_HIP_PROBLEM_AND_0_BEING_THE_INABILITY_TO_PERFORM_ANY_OF_YOUR_USUAL_DAILY_ACTIVITIES?: 100
ABILITY_TO_PERFORM_ACTIVITY_WITH_YOUR_NORMAL_TECHNIQUE: MODERATE DIFFICULTY
DEEP SQUATTING: EXTREME DIFFICULTY
SITTING FOR 15 MINUTES: MODERATE DIFFICULTY
PUTTING_ON_SOCKS_AND_SHOES: SLIGHT DIFFICULTY
HOW_WOULD_YOU_RATE_YOUR_CURRENT_LEVEL_OF_FUNCTION_DURING_YOUR_USUAL_ACTIVITIES_OF_DAILY_LIVING_FROM_0_TO_100_WITH_100_BEING_YOUR_LEVEL_OF_FUNCTION_PRIOR_TO_YOUR_HIP_PROBLEM_AND_0_BEING_THE_INABILITY_TO_PERFORM_ANY_OF_YOUR_USUAL_DAILY_ACTIVITIES?: 70
WALKING_15_MINUTES_OR_GREATER: SLIGHT DIFFICULTY
WALKING_DOWN_STEEP_HILLS: SLIGHT DIFFICULTY
HOS_ADL_HIGHEST_POTENTIAL_SCORE: 68
PUTTING ON SOCKS AND SHOES: SLIGHT DIFFICULTY
HOW_WOULD_YOU_RATE_YOUR_CURRENT_LEVEL_OF_FUNCTION?: NORMAL
ROLLING OVER IN BED: MODERATE DIFFICULTY
RECREATIONAL ACTIVITIES: SLIGHT DIFFICULTY
WALKING_UP_STEEP_HILLS: SLIGHT DIFFICULTY
GETTING_INTO_AND_OUT_OF_A_BATHTUB: MODERATE DIFFICULTY
SPORTS_COUNT: 9
TWISTING/PIVOTING ON INVOLVED LEG: SLIGHT DIFFICULTY
LOW_IMPACT_ACTIVITIES_LIKE_FAST_WALKING: SLIGHT DIFFICULTY
ADL_SCORE(%): 0
HOS_ADL_SCORE(%): 66.18
ROLLING_OVER_IN_BED: MODERATE DIFFICULTY
GETTING_INTO_AND_OUT_OF_A_BATHTUB: MODERATE DIFFICULTY
PLEASE_INDICATE_YOR_PRIMARY_REASON_FOR_REFERRAL_TO_THERAPY:: HIP
GOING_DOWN_1_FLIGHT_OF_STAIRS: SLIGHT DIFFICULTY

## 2024-02-20 NOTE — PROGRESS NOTES
PHYSICAL THERAPY EVALUATION  Type of Visit: Evaluation    See electronic medical record for Abuse and Falls Screening details.    Subjective   She has been having bilateral hip and leg pain for the last three to four weeks. The pain started after sitting in the car for a long period of time. The pain is best when standing or using a heating pad and worse when ascending stairs, getting out of the car, or transferring sit to stand. Pain will radiate from her left hip, up into her side, and down to her mid-IT band.      Presenting condition or subjective complaint: I am having bad hip pain that seems to be triggered by driving my car but has been consistent for a month now after a long road trip. It's very hard to sit and to walk when first getting up  Date of onset:      Relevant medical history: Anemia; Overweight   Dates & types of surgery: 2020 finger surgery 2006 gallbladder removed 1999 tonsils removed    Prior diagnostic imaging/testing results: X-ray     Prior therapy history for the same diagnosis, illness or injury: No      Living Environment  Social support: With a significant other or spouse   Type of home: Apartment/condo   Stairs to enter the home: Yes 40 Is there a railing: Yes   Ramp: No   Stairs inside the home: No       Help at home: None  Equipment owned:       Employment: Yes   Hobbies/Interests:      Patient goals for therapy: I want to move without pain as usual       Objective   HIP EVALUATION  POSTURE: Standing Posture: Rounded shoulders, Right lateral shift  Sitting Posture: Rounded shoulders, Thoracic kyphosis increased  GAIT:   Gait Deviations:  Trendelenburg L>R  WEIGHTBEARING ALIGNMENT: Foot/Ankle WB Alignment:Calcaneal valgus L   ROM: AROM WFL  PELVIC/SI SCREEN: WFL  STRENGTH:   Pain: - none + mild ++ moderate +++ severe  Strength Scale: 0-5/5 Left Right   Hip Flexion 4 4   Hip Extension 5- 5-   Hip Abduction 3+, ++ (mod) 3+   Hip Adduction 4+ 4+   Hip Internal Rotation 4, +  (mild) 5-   Hip External Rotation 5 5   Knee Flexion 5 5   Knee Extension 5 5     LE FLEXIBILITY: WFL  SPECIAL TESTS:    Left Right   BEAU Positive Negative    FADIR/Labrum/JOEL Positive Positive   Femoral Nerve Negative  Negative    Kenia's Positive Positive   Piriformis Negative  Negative    Quadrant Testing Negative  Negative    SLR Negative  Negative    Slump Negative  Negative      FUNCTIONAL TESTS: Double Leg Squat: Anterior knee translation, Increased trunk lean, Improper use of glutes/hips, and minimal ROM  Toe walking: WNL. Heel walking: Pain.  PALPATION:   + Tenderness At Location Left Right   Ischial Tuberosity - -   Greater Trochanter + +   IT Band + -   Hip Flexors + +   Piriformis - +   PSIS - -   ASIS - -   Adductors - -   Abductors + +   Iliac Crest + -   Glut Medius + -   Bursa + -   QL + -     JOINT MOBILITY: WNL    Assessment & Plan   CLINICAL IMPRESSIONS  Medical Diagnosis: Bilateral hip pain    Treatment Diagnosis:     Impression/Assessment: Patient is a 43 year old female with L>R hip pain complaints. The following significant findings have been identified: Pain, Decreased ROM/flexibility, Decreased strength, Impaired gait, Impaired muscle performance, Decreased activity tolerance, and Impaired posture. These impairments interfere with their ability to perform work tasks, driving , and community mobility as compared to previous level of function.     Clinical Decision Making (Complexity):  Clinical Presentation: Evolving/Changing  Clinical Presentation Rationale: based on medical and personal factors listed in PT evaluation  Clinical Decision Making (Complexity): Moderate complexity    PLAN OF CARE  Treatment Interventions:  Modalities: Dry Needling, E-stim, Mechanical Traction  Interventions: Gait Training, Manual Therapy, Neuromuscular Re-education, Therapeutic Activity, Therapeutic Exercise, Self-Care/Home Management    Long Term Goals     PT Goal 1  Goal Identifier: Bilateral hip abduction  strength  Goal Description: Huy will demonstrate bilateral hip abduction strength of >/=4+/5 without pain in order to improve the ease of their ADLs.  Goal Progress: 3+/5 bilaterally with pain  Target Date: 05/20/24  PT Goal 2  Goal Identifier: Left hip IR strength  Goal Description: Huy will demonstrate left hip IR strength of =5/5 without pain in order to improve the ease of their ADLs.  Goal Progress: 4/5 with pain  Target Date: 05/20/24  PT Goal 3  Goal Identifier: Driving tolerance  Goal Description: Huy will dmeonstrate improvement in her symptoms as measured by being able to drive for >/=20 minutes without an increase in her pain when walking/standing afterwards.  Goal Progress: Any duration causes pain  Target Date: 05/20/24  PT Goal 4  Goal Identifier: HEP  Goal Description: Huy will be independent in their HEP in order to facilitate return to prior level of function.  Goal Progress: In progress  Target Date: 05/20/24      Frequency of Treatment: 1 x/week  Duration of Treatment: 90 days    Education Assessment:        Risks and benefits of evaluation/treatment have been explained.   Patient/Family/caregiver agrees with Plan of Care.     Evaluation Time:     PT Eval, Moderate Complexity Minutes (40957): 18     Signing Clinician: CHEYANNE LESLIE, PT

## 2024-02-26 NOTE — TELEPHONE ENCOUNTER
Patient while in PACU, Complained of a \"Burning\" sensation in her Vaginal area, he burning was interior. Dr. Bridges with patient examined  area, no redness, no swelling, no itchiness noted. Dr. Bridges Irrigated are using sterile saline and the burning feeling decreased. A small amount of ky gel was applied to area and that  made things better.   Pre assessment questions completed for upcoming Colonoscopy/Upper endoscopy (EGD) procedure scheduled on 6/20/23    COVID policy reviewed.     Pre-op exam? Yes. Pre op physical is required prior to procedure - scheduled 6/7/23 with Eduardo Keith APRN CNP    Reviewed procedural arrival time 1300, procedure time 1400 and facility location [facility]; [address] [place] [address]    Designated  policy reviewed. Instructed to have someone stay 24 hours post procedure.     NSAIDs? Yes.  Naproxen (Naprosyn, Aleve).  Holding interval of 4 days.    Anticoagulation/blood thinners? No    Electronic implanted devices? No    Diabetic? No.    Procedure indication: Iron deficiency anemia due to chronic blood loss; Esophagitis determined by endoscopy; Hiatal hernia; Gastroesophageal reflux disease with esophagitis without hemorrhage         Bowel prep recommendation: Extended prep Golytely d/t constipation    Reviewed procedure prep instructions.     Prep instructions sent via [employer].    Bowel prep script sent to    [facility] [address] [place] [address].    Patient verbalized understanding and had no questions or concerns at this time.      Lucinda Harrell RN  Endoscopy Procedure Pre Assessment RN

## 2024-03-05 ENCOUNTER — HOSPITAL ENCOUNTER (EMERGENCY)
Facility: CLINIC | Age: 44
Discharge: HOME | End: 2024-03-05
Admitting: STUDENT IN AN ORGANIZED HEALTH CARE EDUCATION/TRAINING PROGRAM
Payer: COMMERCIAL

## 2024-03-05 VITALS
HEART RATE: 70 BPM | RESPIRATION RATE: 16 BRPM | DIASTOLIC BLOOD PRESSURE: 76 MMHG | WEIGHT: 220 LBS | BODY MASS INDEX: 36.65 KG/M2 | OXYGEN SATURATION: 99 % | HEIGHT: 65 IN | SYSTOLIC BLOOD PRESSURE: 126 MMHG | TEMPERATURE: 96.9 F

## 2024-03-05 DIAGNOSIS — R10.2 VAGINAL PAIN: ICD-10-CM

## 2024-03-05 PROCEDURE — 99284 EMERGENCY DEPT VISIT MOD MDM: CPT

## 2024-03-05 ASSESSMENT — COLUMBIA-SUICIDE SEVERITY RATING SCALE - C-SSRS
6. HAVE YOU EVER DONE ANYTHING, STARTED TO DO ANYTHING, OR PREPARED TO DO ANYTHING TO END YOUR LIFE?: NO
1. IN THE PAST MONTH, HAVE YOU WISHED YOU WERE DEAD OR WISHED YOU COULD GO TO SLEEP AND NOT WAKE UP?: NO
2. HAVE YOU ACTUALLY HAD ANY THOUGHTS OF KILLING YOURSELF IN THE PAST MONTH?: NO

## 2024-03-05 NOTE — ED TRIAGE NOTES
Patient here for tampon in vagina, cannot find string, in since 2100, asymptomatic.      Triage Assessment (Adult)       Row Name 03/05/24 1583          Triage Assessment    Airway WDL WDL        Respiratory WDL    Respiratory WDL WDL        Skin Circulation/Temperature WDL    Skin Circulation/Temperature WDL WDL        Cardiac WDL    Cardiac WDL WDL        Peripheral/Neurovascular WDL    Peripheral Neurovascular WDL WDL        Cognitive/Neuro/Behavioral WDL    Cognitive/Neuro/Behavioral WDL WDL

## 2024-03-06 NOTE — DISCHARGE INSTRUCTIONS
You were seen in the ER today for concern of stuck tampon. Your pelvic exam found no presence of tampon, I suspect it likely fell out and you did not notice.    Return to the emergency department if you feel that a tampon is stuck again or you develop fever, nausea, and vomiting prolonged use of a tampon.

## 2024-03-06 NOTE — ED PROVIDER NOTES
"Emergency Department Encounter   NAME: Huy Coles ; AGE: 43 year old female ; YOB: 1980 ; MRN: [id number] ; PCP: Eduardo Keith   ED PROVIDER: Amelia Shaw PA-C    Evaluation Date & Time:   No admission date for patient encounter.    CHIEF COMPLAINT:  Foreign Body in Vagina        Impression and Plan   FINAL IMPRESSION:    ICD-10-CM    1. Vaginal pain  R10.2           MDM:  Huy Coles is a 43 year old female with PMH of anxiety, iron deficiency, GERD, and MDD presenting to the ED for concern for vaginal foreign body. She states she put a tampon in and around 8 PM last night and slept for 13 hours.  She then went to the bathroom and states she is unsure if her tampon fell out then but she did not take it out and is concerned it may be stuck. She states cannot feel the strings. Endorses concern with \"digging for it\" as she did not want to push the tampon up even higher and \"lose it\". Denies any fevers, chills, nausea, vomiting.     Vitals reviewed and unremarkable. On exam is resting comfortably, not ill-appearing. Differential diagnosis includes but not limited to vaginal foreign body, vaginal trauma, vaginal infection, toxic shock syndrome. I do not suspect TSS as she is afebrile with stable vitals and overall very well appearing. Pelvic exam was performed with chaperone and visualized the cervix, there is no evidence of retained tampon or other foreign bodies. There is a large amount of dark red/brown blood present in the vaginal vault. No malodor or purulent drainage to indicate infection. I informed the patient of this finding and suspect the tampon likely fell out today while she was on the toilet. I also educated the patient on anatomy of the vagina and cervix. Patient was discharged with all questions answered.      Medical Decision Making    History:  Supplemental history from: Documented in chart  External Record(s) reviewed: Documented in chart    Work Up:  Chart documentation includes " differential considered and any EKGs or imaging independently interpreted by provider, where specified.  In additional to work up documented, I considered the following work up: Documented in chart, if applicable.    External consultation:  Discussion of management with another provider: Documented in chart, if applicable    Complicating factors:  Care impacted by chronic illness: N/A  Care affected by social determinants of health: N/A    Disposition considerations: Discharge. No recommendations on prescription strength medication(s). See documentation for any additional details.      ED COURSE:  6:35 PM I met and introduced myself to the patient. I gathered initial history and performed my physical exam. We discussed plan for initial workup. I l performed pelvic exam and we discussed discharge, follow up, and reasons to return to the ED.     At the conclusion of the encounter I discussed the results of all the tests and the disposition. The questions were answered. The patient or family acknowledged understanding and was agreeable with the care plan.        MEDICATIONS GIVEN IN THE EMERGENCY DEPARTMENT:  Medications - No data to display      NEW PRESCRIPTIONS STARTED AT TODAY'S ED VISIT:  Discharge Medication List as of 3/5/2024  6:40 PM            HPI   Patient information was obtained from: patient  Use of Intrepreter: N/A     Huy oCles is a 43 year old female with PMH of anxiety, iron deficiency, GERD, and MDD presenting to the ED for concern for vaginal foreign body. She states she put a tampon in and around 8 PM last night and slept for 13 hours.  She then went to the bathroom and states she is unsure if her tampon fell out then but she did not take it out and is concerned it may be stuck. She cannot feel the strings. Denies any fevers, chills, nausea, vomiting.       Medical History     Past Medical History:   Diagnosis Date    Depressive disorder 1999       Past Surgical History:   Procedure Laterality  "Date    CHOLECYSTECTOMY  2006    COLONOSCOPY N/A 6/20/2023    Procedure: Colonoscopy;  Surgeon: Danny Almodovar MD;  Location:  GI    ENT SURGERY  1998    Tonsills and adenoids removed, sinus surgery    ESOPHAGOSCOPY, GASTROSCOPY, DUODENOSCOPY (EGD), COMBINED N/A 6/20/2023    Procedure: Esophagoscopy, gastroscopy, duodenoscopy (EGD), combined;  Surgeon: Danny Almodovar MD;  Location:  GI    ORTHOPEDIC SURGERY  2020       Family History   Problem Relation Age of Onset    Depression Mother     Mental Illness Mother     Substance Abuse Mother     Osteoporosis Mother     Osteoporosis Maternal Grandmother     Colon Cancer Paternal Grandmother     Depression Son     Anxiety Disorder Son        Social History     Tobacco Use    Smoking status: Never    Smokeless tobacco: Never   Vaping Use    Vaping Use: Never used   Substance Use Topics    Alcohol use: Yes     Comment: occassional    Drug use: Never       busPIRone (BUSPAR) 5 MG tablet  esomeprazole (NEXIUM) 40 MG DR capsule  fluticasone (FLONASE) 50 MCG/ACT nasal spray  multivitamin, therapeutic (THERA-VIT) TABS tablet  omeprazole (PRILOSEC) 40 MG DR capsule  phentermine (ADIPEX-P) 37.5 MG capsule  sertraline (ZOLOFT) 100 MG tablet  topiramate (TOPAMAX) 25 MG tablet  Vitamin D, Cholecalciferol, 10 MCG (400 UNIT) TABS          Physical Exam     First Vitals:  Patient Vitals for the past 24 hrs:   BP Temp Temp src Pulse Resp SpO2 Height Weight   03/05/24 1640 126/76 96.9  F (36.1  C) Temporal 70 16 99 % 1.651 m (5' 5\") 99.8 kg (220 lb)         PHYSICAL EXAM    General Appearance:  Alert, cooperative, no distress, appears stated age. Resting comfortably, not toxic appearing.  GI: Abdomen soft, nontender.  : No CVA tenderness. Pelvic exam with speculum was performed with chaperone. The cervix was visualized, no evidence of tampon or retained foreign body in the vagina. There is a large amount of dark brown/red blood in the vaginal vault. No malodor or purulent " drainage.  Musculoskeletal: Moving all extremities. No gross deformities  Integument: Warm, dry, no rashes or lesions  Neurologic: Alert and orientated x3. No focal deficits.  Psych: Normal mood and affect        Results     LAB:  All pertinent labs reviewed and interpreted  Labs Ordered and Resulted from Time of ED Arrival to Time of ED Departure - No data to display    RADIOLOGY:  No orders to display         ECG:  N/A      PROCEDURES:  N/A    Amelia Shaw PA-C   Emergency Medicine   [facility] EMERGENCY ROOM       Amelia Shaw PA-C  03/05/24 5445

## 2024-03-06 NOTE — ED NOTES
Pt seen and discharged from [facility]. Please see provider's note for assessment. Reviewed discharge instructions and answered all questions.

## 2024-03-10 ENCOUNTER — HEALTH MAINTENANCE LETTER (OUTPATIENT)
Age: 44
End: 2024-03-10

## 2024-03-19 NOTE — PROGRESS NOTES
"    DISCHARGE  Reason for Discharge: Insurance issues      Discharge Plan: Patient to continue home program.    Referring Provider:  Yung Lee PA-C       02/20/24 0500   Appointment Info   Signing clinician's name / credentials Rigo Ricketts, PT, DPT, CMTPT/DN   Visits Used 1   Medical Diagnosis Bilateral hip pain   Progress Note/Certification   Therapy Frequency 1 x/week   Predicted Duration 90 days   GOALS   PT Goals 2;3;4   PT Goal 1   Goal Identifier Bilateral hip abduction strength   Goal Description Huy will demonstrate bilateral hip abduction strength of >/=4+/5 without pain in order to improve the ease of their ADLs.   Goal Progress 3+/5 bilaterally with pain   Target Date 05/20/24   PT Goal 2   Goal Identifier Left hip IR strength   Goal Description Huy will demonstrate left hip IR strength of =5/5 without pain in order to improve the ease of their ADLs.   Goal Progress 4/5 with pain   Target Date 05/20/24   PT Goal 3   Goal Identifier Driving tolerance   Goal Description Huy will dmeonstrate improvement in her symptoms as measured by being able to drive for >/=20 minutes without an increase in her pain when walking/standing afterwards.   Goal Progress Any duration causes pain   Target Date 05/20/24   PT Goal 4   Goal Identifier HEP   Goal Description Huy will be independent in their HEP in order to facilitate return to prior level of function.   Goal Progress In progress   Target Date 05/20/24   Subjective Report   Subjective Report See eval   Treatment Interventions (PT)   Interventions Therapeutic Procedure/Exercise   Therapeutic Procedure/Exercise   Therapeutic Procedures: strength, endurance, ROM, flexibility minutes (36896) 24   Ther Proc 1 Standing hip flexor stretch   Ther Proc 1 - Details x30\" B   Therapeutic Procedures Ther Proc 2;Ther Proc 3;Ther Proc 4;Ther Proc 5   Ther Proc 2 Supine QL stretch   Ther Proc 2 - Details x30\" B   Ther Proc 3 Piriformis above 90   Ther Proc 3 - " "Details x30\" B, increased pain on both sides   Ther Proc 4 Piriformis below 90   Ther Proc 4 - Details x30\" B, minimal stretch felt   Ther Proc 5 Standing L->R side glide   Ther Proc 5 - Details x15   Eval/Assessments   PT Eval, Moderate Complexity Minutes (90122) 18   Plan   Home program PTRx   Plan for next session Assess HEP. Assess right lateral shift   Comments   Comments Patient is a 43 year old female with L>R hip pain complaints. The following significant findings have been identified: Pain, Decreased ROM/flexibility, Decreased strength, Impaired gait, Impaired muscle performance, Decreased activity tolerance, and Impaired posture. These impairments interfere with their ability to perform work tasks, driving , and community mobility as compared to previous level of function.   Total Session Time   Timed Code Treatment Minutes 24   Total Treatment Time (sum of timed and untimed services) 42         "

## 2024-04-22 ENCOUNTER — PATIENT OUTREACH (OUTPATIENT)
Dept: ONCOLOGY | Facility: CLINIC | Age: 44
End: 2024-04-22
Payer: COMMERCIAL

## 2024-04-22 NOTE — PROGRESS NOTES
"[facility]: Cancer Care                                                                                          Called to follow up on patient's weight loss journey and heartburn/discomfort.   She has not lost any weight due to \"Life getting in the way\" and not following up with her PCP   She has started taking Nexium 40mg once daily and will increase frequency in 2 weeks after her f/up with PCP   I encouraged patient to call me if she makes any move closer to her goal weight for surgery.   She is agreeable with the plan    Signature:  Judith Montemayor RN  "

## 2024-05-07 ENCOUNTER — VIRTUAL VISIT (OUTPATIENT)
Dept: GASTROENTEROLOGY | Facility: CLINIC | Age: 44
End: 2024-05-07
Attending: PHYSICIAN ASSISTANT
Payer: COMMERCIAL

## 2024-05-07 VITALS — BODY MASS INDEX: 36.61 KG/M2 | WEIGHT: 220 LBS

## 2024-05-07 DIAGNOSIS — E66.812 CLASS 2 OBESITY WITH BODY MASS INDEX (BMI) OF 36.0 TO 36.9 IN ADULT, UNSPECIFIED OBESITY TYPE, UNSPECIFIED WHETHER SERIOUS COMORBIDITY PRESENT: Primary | ICD-10-CM

## 2024-05-07 DIAGNOSIS — K44.9 HIATAL HERNIA: ICD-10-CM

## 2024-05-07 DIAGNOSIS — K21.00 GASTROESOPHAGEAL REFLUX DISEASE WITH ESOPHAGITIS WITHOUT HEMORRHAGE: ICD-10-CM

## 2024-05-07 DIAGNOSIS — D50.0 IRON DEFICIENCY ANEMIA DUE TO CHRONIC BLOOD LOSS: ICD-10-CM

## 2024-05-07 PROCEDURE — 99214 OFFICE O/P EST MOD 30 MIN: CPT | Mod: 95 | Performed by: PHYSICIAN ASSISTANT

## 2024-05-07 ASSESSMENT — PAIN SCALES - GENERAL: PAINLEVEL: NO PAIN (0)

## 2024-05-07 NOTE — NURSING NOTE
Is the patient currently in the state of [place]? YES    Visit mode:VIDEO    If the visit is dropped, the patient can be reconnected by: VIDEO VISIT: Text to cell phone:   Telephone Information:   Mobile [phone]       Will anyone else be joining the visit? NO  (If patient encounters technical issues they should call [phone]    How would you like to obtain your AVS? MyChart    Are changes needed to the allergy or medication list? Pt stated no changes to allergies and Pt stated no med changes    Are refills needed on medications prescribed by this physician? NO    Reason for visit: Video Visit (Recheck)    Vy SHEA

## 2024-05-07 NOTE — LETTER
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 currently a good surgical candidate secondary to her BMI (suggested 20 pound weight loss before considering surgery).  Recommendations were for patient to focus on weight loss with eventual hiatal hernia repair and consideration of bariatric surgery with Dr. Brown.    7/24/2023 patient was seen by her primary care provider for follow-up of her anemia.  Labs including homocystine, methylmalonic acid, protein electrophoresis, lactate dehydrogenase, B12, vitamin D and folate were unremarkable.  Iron studies were notable for low total iron and iron saturation index with mild improvement from 6 months prior.  Peripheral smear was notable for microcytic and hypochromic morphology. She was sequently prescribed Venofer 300 mg and had completed 3/3 infusions.    #Hiatal Hernia - 5 cm   #GERD   #Iron Deficiency  #Obesity BMI 36.61    Huy presents today with breakthrough symptoms of regurgitation occurring at least 4 times per week despite use of Nexium 40 mg twice daily.  Ultimately this is likely secondary to her large hiatal hernia however exacerbated by her poor dietary habits.  She continues to  consume at least 1L of soda per day. Again an emphasis was placed on weight loss through alterations in dietary patterns and increasing physical activity as well as reflux friendly lifestyle modifications to aid in controlling breakthrough reflux symptoms.    In regards to Huy persistent iron deficiency this is likely driven by her restricted diet for which she has been asked to follow-up with her PCP to discuss replacement therapy.  Additional considerations would be to complete a capsule endoscopy. However, endoscopic placement should be considered in setting of a hiatal hernia. Additionally Jose Pelletier lesions could be contributing from within the hernial sac which cannot always be appreciated on endoscopy.     - Referral to medical weight management   - Refrain from consumption of carbonated beverages   -Start Nexium 20 mg twice daily which is best taken on empty stomach 3060 minutes prior to meals  - Reflux lifestyle modifications as directed within the AVS  - Labs to be completed at your next best convenience. Follow up with primary care provide for management of iron deficiency  - Please try to incorporate high iron foods into your daily diet   - If anemia persists could would then consider a capsule endoscopy to rule out small bowel etiology of anemia     #Colorectal Cancer Screening   Colonoscopy 6/2023 that was unremarkable. Family history pertinent for great grandmother had colon cancer and great uncles with possible colon cancer as well. Per performing endoscopist recall colon cancer screening can be completed in 10 years or sooner if otherwise indicated.                                                                                                  Follow up plan:   Return to clinic 6 months and as needed.    The risks and benefits of my recommendations, as well as other treatment options were discussed with the patient and any available family today. All questions were answered.     Follow up:  As planned above. Today, I personally spent 17 minutes in direct face to face time with the patient, of which greater than 50% of the time was spent in patient education and counseling as described above. Approximately 15 minutes were spent on indirect care associated with the patient's consultation including but not limited to review of: patient medical records to date, clinic visits, hospital records, lab results, imaging studies, procedural documentation, and coordinating care with other providers. The findings from this review are summarized in the above note. All of the above accounted for a cumulative time of 32 minutes and was performed on the date of service.     The patient verbalized understanding of the plan and was appreciative for the time spent and information provided during the office visit.         Documentation assisted by voice recognition and documentation system.        Again, thank you for allowing me to participate in the care of your patient.      Sincerely,    Katalina Shahid PA-C

## 2024-05-07 NOTE — PROGRESS NOTES
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 surgery Dr. Anderson who recommended that patient was not currently a good surgical candidate secondary to her BMI (suggested 20 pound weight loss before considering surgery).  Recommendations were for patient to focus on weight loss with eventual hiatal hernia repair and consideration of bariatric surgery with Dr. Brown.    7/24/2023 patient was seen by her primary care provider for follow-up of her anemia.  Labs including homocystine, methylmalonic acid, protein electrophoresis, lactate dehydrogenase, B12, vitamin D and folate were unremarkable.  Iron studies were notable for low total iron and iron saturation index with mild improvement from 6 months prior.  Peripheral smear was notable for microcytic and hypochromic morphology. She was sequently prescribed Venofer 300 mg and had completed 3/3 infusions.    #Hiatal Hernia - 5 cm   #GERD   #Iron Deficiency  #Obesity BMI 36.61    Huy presents today with breakthrough symptoms of regurgitation occurring at least 4 times per week despite use of Nexium 40 mg twice daily.  Ultimately this is likely secondary to her large hiatal hernia however exacerbated by her poor dietary habits.  She continues  to consume at least 1L of soda per day. Again an emphasis was placed on weight loss through alterations in dietary patterns and increasing physical activity as well as reflux friendly lifestyle modifications to aid in controlling breakthrough reflux symptoms.    In regards to Huy persistent iron deficiency this is likely driven by her restricted diet for which she has been asked to follow-up with her PCP to discuss replacement therapy.  Additional considerations would be to complete a capsule endoscopy. However, endoscopic placement should be considered in setting of a hiatal hernia. Additionally Jose Pelletier lesions could be contributing from within the hernial sac which cannot always be appreciated on endoscopy.     - Referral to medical weight management   - Refrain from consumption of carbonated beverages   -Start Nexium 20 mg twice daily which is best taken on empty stomach 3060 minutes prior to meals  - Reflux lifestyle modifications as directed within the AVS  - Labs to be completed at your next best convenience. Follow up with primary care provide for management of iron deficiency  - Please try to incorporate high iron foods into your daily diet   - If anemia persists could would then consider a capsule endoscopy to rule out small bowel etiology of anemia     #Colorectal Cancer Screening   Colonoscopy 6/2023 that was unremarkable. Family history pertinent for great grandmother had colon cancer and great uncles with possible colon cancer as well. Per performing endoscopist recall colon cancer screening can be completed in 10 years or sooner if otherwise indicated.                                                                                                  Follow up plan:   Return to clinic 6 months and as needed.    The risks and benefits of my recommendations, as well as other treatment options were discussed with the patient and any available family today. All questions were answered.     o Follow  up: As planned above. Today, I personally spent 17 minutes in direct face to face time with the patient, of which greater than 50% of the time was spent in patient education and counseling as described above. Approximately 15 minutes were spent on indirect care associated with the patient's consultation including but not limited to review of: patient medical records to date, clinic visits, hospital records, lab results, imaging studies, procedural documentation, and coordinating care with other providers. The findings from this review are summarized in the above note. All of the above accounted for a cumulative time of 32 minutes and was performed on the date of service.     The patient verbalized understanding of the plan and was appreciative for the time spent and information provided during the office visit.           Katalina Shahid PA-C  Division of Gastroenterology, Hepatology, and Nutrition  [facility]       Documentation assisted by voice recognition and documentation system.

## 2024-05-10 ENCOUNTER — TELEPHONE (OUTPATIENT)
Dept: GASTROENTEROLOGY | Facility: CLINIC | Age: 44
End: 2024-05-10
Payer: COMMERCIAL

## 2024-06-27 ENCOUNTER — TELEPHONE (OUTPATIENT)
Dept: GASTROENTEROLOGY | Facility: CLINIC | Age: 44
End: 2024-06-27
Payer: COMMERCIAL

## 2024-06-27 NOTE — TELEPHONE ENCOUNTER
Prior Authorization Retail Medication Request    Medication/Dose: esomeprazole (Nexium) 20 mg BID  Diagnosis and ICD code (if different than what is on RX):    New/renewal/insurance change PA/secondary ins. PA:  Previously Tried and Failed:  Nexium 40 mg once daily, continued regurgitation and cough  Rationale:  Hiatal Hernia, GERD, regurgitation    Insurance   Primary: [facility]   Insurance ID:  [id number]    Secondary (if applicable):  Insurance ID:      Pharmacy Information (if different than what is on RX)  Name:    Phone:    Fax:

## 2024-07-01 NOTE — TELEPHONE ENCOUNTER
PA Initiation    Medication: ESOMEPRAZOLE MAGNESIUM 20 MG PO CPDR  Insurance Company: [facility] - Phone [phone] Fax [phone]  Pharmacy Filling the Rx: [facility] [address] [place] [address].  Filling Pharmacy Phone: [phone]  Filling Pharmacy Fax:    Start Date: 7/1/2024

## 2024-07-02 NOTE — TELEPHONE ENCOUNTER
Prior Authorization Approval    Authorization Effective Date: 6/2/2024  Authorization Expiration Date: 7/2/2025  Medication:   Reference #: [id number]   Insurance Company: [facility] - Phone [phone] Fax [phone]  Which Pharmacy is filling the prescription (Not needed for infusion/clinic administered): [facility] [address] [place] [address].  Pharmacy Notified: Yes  Patient Notified: Instructed pharmacy to notify patient when script is ready to /ship.

## 2024-07-11 ENCOUNTER — OFFICE VISIT (OUTPATIENT)
Dept: FAMILY MEDICINE | Facility: CLINIC | Age: 44
End: 2024-07-11
Payer: COMMERCIAL

## 2024-07-11 VITALS
DIASTOLIC BLOOD PRESSURE: 76 MMHG | BODY MASS INDEX: 36.99 KG/M2 | SYSTOLIC BLOOD PRESSURE: 118 MMHG | WEIGHT: 222 LBS | OXYGEN SATURATION: 99 % | HEART RATE: 80 BPM | RESPIRATION RATE: 14 BRPM | TEMPERATURE: 98.3 F | HEIGHT: 65 IN

## 2024-07-11 DIAGNOSIS — F41.1 GENERALIZED ANXIETY DISORDER: ICD-10-CM

## 2024-07-11 DIAGNOSIS — E04.1 THYROID NODULE: ICD-10-CM

## 2024-07-11 DIAGNOSIS — F33.0 MILD EPISODE OF RECURRENT MAJOR DEPRESSIVE DISORDER (H): ICD-10-CM

## 2024-07-11 DIAGNOSIS — D50.9 IRON DEFICIENCY ANEMIA, UNSPECIFIED IRON DEFICIENCY ANEMIA TYPE: ICD-10-CM

## 2024-07-11 DIAGNOSIS — K21.00 HIATAL HERNIA WITH GASTROESOPHAGEAL REFLUX DISEASE AND ESOPHAGITIS: Primary | ICD-10-CM

## 2024-07-11 DIAGNOSIS — E66.812 CLASS 2 SEVERE OBESITY DUE TO EXCESS CALORIES WITH SERIOUS COMORBIDITY AND BODY MASS INDEX (BMI) OF 36.0 TO 36.9 IN ADULT (H): ICD-10-CM

## 2024-07-11 DIAGNOSIS — K44.9 HIATAL HERNIA WITH GASTROESOPHAGEAL REFLUX DISEASE AND ESOPHAGITIS: Primary | ICD-10-CM

## 2024-07-11 DIAGNOSIS — E66.01 CLASS 2 SEVERE OBESITY DUE TO EXCESS CALORIES WITH SERIOUS COMORBIDITY AND BODY MASS INDEX (BMI) OF 36.0 TO 36.9 IN ADULT (H): ICD-10-CM

## 2024-07-11 PROBLEM — S02.2XXA CLOSED FRACTURE OF NASAL BONES: Status: RESOLVED | Noted: 2020-04-22 | Resolved: 2024-07-11

## 2024-07-11 LAB
ERYTHROCYTE [DISTWIDTH] IN BLOOD BY AUTOMATED COUNT: 14.2 % (ref 10–15)
HCT VFR BLD AUTO: 39.5 % (ref 35–47)
HGB BLD-MCNC: 12.3 G/DL (ref 11.7–15.7)
MCH RBC QN AUTO: 27.5 PG (ref 26.5–33)
MCHC RBC AUTO-ENTMCNC: 31.1 G/DL (ref 31.5–36.5)
MCV RBC AUTO: 88 FL (ref 78–100)
PLATELET # BLD AUTO: 380 10E3/UL (ref 150–450)
RBC # BLD AUTO: 4.48 10E6/UL (ref 3.8–5.2)
WBC # BLD AUTO: 10.6 10E3/UL (ref 4–11)

## 2024-07-11 PROCEDURE — 83550 IRON BINDING TEST: CPT | Performed by: PHYSICIAN ASSISTANT

## 2024-07-11 PROCEDURE — 85027 COMPLETE CBC AUTOMATED: CPT | Performed by: PHYSICIAN ASSISTANT

## 2024-07-11 PROCEDURE — 99214 OFFICE O/P EST MOD 30 MIN: CPT | Performed by: PHYSICIAN ASSISTANT

## 2024-07-11 PROCEDURE — 36415 COLL VENOUS BLD VENIPUNCTURE: CPT | Performed by: PHYSICIAN ASSISTANT

## 2024-07-11 PROCEDURE — G2211 COMPLEX E/M VISIT ADD ON: HCPCS | Performed by: PHYSICIAN ASSISTANT

## 2024-07-11 PROCEDURE — 82728 ASSAY OF FERRITIN: CPT | Performed by: PHYSICIAN ASSISTANT

## 2024-07-11 PROCEDURE — 83540 ASSAY OF IRON: CPT | Performed by: PHYSICIAN ASSISTANT

## 2024-07-11 RX ORDER — LORAZEPAM 0.5 MG/1
0.5 TABLET ORAL EVERY 6 HOURS PRN
Qty: 10 TABLET | Refills: 0 | Status: SHIPPED | OUTPATIENT
Start: 2024-07-11

## 2024-07-11 ASSESSMENT — PAIN SCALES - GENERAL: PAINLEVEL: NO PAIN (0)

## 2024-07-11 NOTE — PROGRESS NOTES
"  Assessment & Plan     Hiatal hernia with gastroesophageal reflux disease and esophagitis  Class 2 severe obesity due to excess calories with serious comorbidity and body mass index (BMI) of 36.0 to 36.9 in adult (H) - has not had much success with phentermine, topiramate, GLP1s not well covered by insurance. She has upcoming weight management appt in Sept, will continue with lifestyle management until then.    Thyroid nodule - repeat monitoring US ordered  - US Thyroid    Iron deficiency anemia, unspecified iron deficiency anemia type - monitoring labs ordered  - REVIEW OF HEALTH MAINTENANCE PROTOCOL ORDERS  - Ferritin  - Iron and iron binding capacity  - CBC with platelets  - Ferritin  - Iron and iron binding capacity  - CBC with platelets    Generalized anxiety disorder - stable, refills sent. OK for lorazepam 0.5mg #10/year.  - sertraline (ZOLOFT) 50 MG tablet  Dispense: 90 tablet; Refill: 1  - LORazepam (ATIVAN) 0.5 MG tablet  Dispense: 10 tablet; Refill: 0    Mild episode of recurrent major depressive disorder (H24) - stable, refillsbsent  - sertraline (ZOLOFT) 50 MG tablet  Dispense: 90 tablet; Refill: 1    BMI  Estimated body mass index is 36.94 kg/m  as calculated from the following:    Height as of this encounter: 1.651 m (5' 5\").    Weight as of this encounter: 100.7 kg (222 lb).       The longitudinal plan of care for the diagnosis(es)/condition(s) as documented were addressed during this visit. Due to the added complexity in care, I will continue to support Huy in the subsequent management and with ongoing continuity of care.      Lola Son is a 43 year old, presenting for the following health issues:  Establish Care and Anemia      7/11/2024     3:37 PM   Additional Questions   Roomed by Toshia BELLO   Accompanied by      Wants surgery for hiatal hernia, needs to lose 20lbs to be eligible for surgery  Phentermine made her jittery, topiramate made all food taste bad    Requests screening " "for anemia  Follow up thyroid nodule US due    Lorazepam #10/year, rare use    History of Present Illness       Reason for visit:  Routine follow up - deb.     She eats 0-1 servings of fruits and vegetables daily.She consumes 5 sweetened beverage(s) daily.She exercises with enough effort to increase her heart rate 9 or less minutes per day.  She exercises with enough effort to increase her heart rate 3 or less days per week.   She is taking medications regularly.         Objective    /76 (BP Location: Right arm, Patient Position: Sitting, Cuff Size: Adult Regular)   Pulse 80   Temp 98.3  F (36.8  C) (Temporal)   Resp 14   Ht 1.651 m (5' 5\")   Wt 100.7 kg (222 lb)   LMP 07/01/2024 (Approximate)   SpO2 99%   BMI 36.94 kg/m    Body mass index is 36.94 kg/m .  Physical Exam   GENERAL: alert and no distress  PSYCH: mentation appears normal, affect normal/bright          Signed Electronically by: Judi Draper PA-C    "

## 2024-07-12 LAB
FERRITIN SERPL-MCNC: 16 NG/ML (ref 6–175)
IRON BINDING CAPACITY (ROCHE): 313 UG/DL (ref 240–430)
IRON SATN MFR SERPL: 15 % (ref 15–46)
IRON SERPL-MCNC: 46 UG/DL (ref 37–145)

## 2024-07-15 ENCOUNTER — HOSPITAL ENCOUNTER (OUTPATIENT)
Dept: ULTRASOUND IMAGING | Facility: CLINIC | Age: 44
Discharge: HOME | End: 2024-07-15
Attending: PHYSICIAN ASSISTANT | Admitting: PHYSICIAN ASSISTANT
Payer: COMMERCIAL

## 2024-07-15 DIAGNOSIS — E04.1 THYROID NODULE: ICD-10-CM

## 2024-07-15 PROCEDURE — 76536 US EXAM OF HEAD AND NECK: CPT

## 2024-07-16 PROBLEM — E04.1 THYROID NODULE: Status: ACTIVE | Noted: 2024-07-16

## 2024-09-05 NOTE — PROGRESS NOTES
"Huy is a 43 year old who is being evaluated via a billable video visit.      The patient has been notified of following:     \"This video visit will be conducted via a call between you and your physician/provider. We have found that certain health care needs can be provided without the need for an in-person physical exam.  This service lets us provide the care you need with a video conversation.  If a prescription is necessary we can send it directly to your pharmacy.  If lab work is needed we can place an order for that and you can then stop by our lab to have the test done at a later time.    Video visits are billed at different rates depending on your insurance coverage.  Please reach out to your insurance provider with any questions.    If during the course of the call the physician/provider feels a video visit is not appropriate, you will not be charged for this service.\"    Patient has given verbal consent for Video visit? Yes    How would you like to obtain your AVS? MyChart    If the video visit is dropped, the invitation should be resent by:My Chart    Will anyone else be joining your video visit? No    I    Video-Visit Details    Type of service:  Video Visit    Originating Location (pt. Location): Home    Distant Location (provider location):   Off-Site - Provider Home Office    Platform used for Video Visit: [employer]    Video Start Time: 9:03 AM    Video End Time: 9:52 AM        New Medical Weight Management Consult        PATIENT:  Huy Coles  MRN:         [id number]  :         1980  JAMES:         2024      Dear Judi Draper PA-C,    I had the pleasure of seeing your patient, Huy Coles. Full intake/assessment was done to determine barriers to weight loss success and develop a treatment plan. Huy Coles is a 43 year old female interested in treatment of medical problems associated with excess weight. She has a height of 5' 5\", a weight of 220 lbs 0 oz, and the calculated Body mass " "index is 36.61 kg/m .    Was on the depo shot starting age 15 - 25. States \"woke up and was fat\" age 22 when went from size 4 to a size 12 \"overnight\". Has been at current weight for years.     Eating is horrible due to texture thing. Does not eat most meats, veggies and fruit. Eats a lot of cheese pizza and pasta. Knows that she will not change her diet. Has had a few visits with a therapist about this in the past. Was not helpful. Drinks 2 liter of Dr Pepper daily.    Needs to lose 20 lbs for hiatal hernia repair. GERD is very problematic. Patient thinks if loses this weight will regain it back so does not fully understand the point.    Has a disabled  that patient financially supports.       ASSESSMENT & PLAN:    Problem List Items Addressed This Visit       Hiatal hernia with gastroesophageal reflux disease and esophagitis    Relevant Medications    esomeprazole (NEXIUM) 20 MG DR capsule    Class 2 severe obesity due to excess calories with serious comorbidity and body mass index (BMI) of 36.0 to 36.9 in adult (H) - Primary     Other Visit Diagnoses       Prediabetes        Medication management        Relevant Orders    HCG Qual, Urine ([id number])             PROGRAM OVERVIEW  Reviewed options at [facility] including provider visits, dietician, 24 week healthy lifestyle program, health coaching, food supplements, Get Moving program, and psychological support.  All questions about weight loss program were answered.    SURGICAL WEIGHT LOSS   Option presented given pt BMI and current comorbid conditions. No current interest.       MEDICATIONS:  We discussed healthy habits to assist with weight loss. We reviewed medications associated with weight gain. We discussed the role of pharmacological agents in the treatment of obesity and the \"off-label\" use of medications in this practice. We reviewed medication that may assist with weight loss. Indications, contraindications, risks/benefits, and " potential side effects were discussed. Phentermine 15 mg and topiramate 50 mg will be prescribed after negative pregnancy test. Discussed that medications must always be used together with lifestyle changes such as improvements in diet choices, portion control and establishing and maintaining a regular exercise program.     Admits has bad self control    AOM Considerations:  Phentermine: Taken 37.5 mg. Took it for 3 weeks but not daily. No reported side effects. This was last year   Topiramate: Taken for 2-3 weeks last year with the phentermine- made Dr Pepper taste bad. Did not have side effects but admits was not disciplined about taking. Felt a bit tired while taking  GLP-1:  option. Due to patients diet choices has reservations on starting this today. Patient will look into insurance coverage by next visit.    Naltrexone:    Wellbutrin:    Metformin: option         Patient admits not great at being consistent with medications. Is willing to retry the phentermine and topiramate. Not willing to add veggies or other items to diet. So will work on decreasing/stopping Dr Pepper which alone could give the needed weight loss. Will also continue with newer exercise routine.      PATIENT INSTRUCTIONS:  Continue with current exercise plan  Call insurance to see if you have coverage for wegovy or zepbound  Get in at least 50 oz of water daily   Start phentermine 15 mg daily as well as topiramate 50 mg (will be prescribed after negative pregnancy test)  Will get blood pressure, weight and pulse at nurse visit 2 weeks after starting phentermine      Upon review of negative pregnancy test will send over Rx for phentermine 15 mg and topiramate 25 mg (will titrate up to 50 mg) Patient will do monthly pregnancy tests after initial lab. If turns positive will immediately stop the medication      Follow up: Return to clinic in 4 months with Renee Clinton and April with Faith      60 minutes spent on the date of the encounter  "doing chart review, review of test results, patient visit and documentation         She has the following co-morbidities:        9/9/2024     4:11 PM   --   I have the following health issues associated with obesity GERD (Reflux)   I have the following symptoms associated with obesity Fatigue    Hip Pain   Wakes up at night from GERD. Patient knows the days that it is bad if she eats too much.          9/9/2024     4:11 PM   Patient Goals   If yes, please indicate which surgery? Gastrointestinal surgery (stomach in esophagus)           9/9/2024     4:11 PM   Referring Provider   Please name the provider who referred you to Medical Weight Management  If you do not know, please answer \"I Don't Know\" Gi doctor and surgeon           9/9/2024     4:11 PM   Weight History   How concerned are you about your weight? Not Concerned   I became overweight As an Adult   The following factors have contributed to my weight gain Mental Health Issues    Eating Wrong Types of Food    Lack of Exercise    Genetic (Runs in the Family)   I have tried the following methods to lose weight Watching Portions or Calories    Exercise    Slim Fast or Other Liquid Diets   My lowest weight since age 19 was 140   My highest weight since age 19 was 270   The most weight I have ever lost was (lbs) 40   I have the following family history of obesity/being overweight My mother is overweight    One or more of my siblings are overweight    Many of my relatives are overweight     Has limited selection with what will eat. Has even gone through therapy for this as it is a texture thing  B: cinnamon muffin with Dr De La Fuente  L: 1 piece of restaurant cheese pizza  D: Homemade mac and cheese  Will snack if has only 2 meals but if has 3 does not snack  Fluid: 48 oz water daily. 2 liter of Dr Pepper. Limited alcohol      9/9/2024     4:11 PM   Diet Recall Review with Patient   If you do eat breakfast, what types of food do you eat? Muffins, cereal, waffles   If " you do eat lunch, what types of food do you typically eat? Pizza, pasta, sandwich   If you do eat supper, what types of food do you typically eat? Same (pizza, pasta, sandwich)   If you do snack, what types of food do you typically eat? Chips, cheese   How many glasses of juice do you drink in a typical day? 1   How many of glasses of milk do you drink in a typical day? 1   If you do drink milk, what type? 2%   How many 8oz glasses of sugar containing drinks such as Luis Miguel-Aid/sweet tea do you drink in a day? 4   How many cans/bottles of sugar pop/soda/tea/sports drinks do you drink in a day? 4   How many cans/bottles of diet pop/soda/tea or sports drink do you drink in a day? 0   How often do you have a drink of alcohol? Monthly or Less           9/9/2024     4:11 PM   Eating Habits   Generally, my meals include foods like these bread, pasta, rice, potatoes, corn, crackers, sweet dessert, pop, or juice Everyday   Generally, my meals include foods like these fried meats, brats, burgers, french fries, pizza, cheese, chips, or ice cream Everyday   Eat fast food (like McDonalds, Burger Ric, Taco Bell) A Few Times a Week   Eat at a buffet or sit-down restaurant Less Than Weekly   Eat most of my meals in front of the TV or computer Everyday   Often skip meals, eat at random times, have no regular eating times Everyday   Rarely sit down for a meal but snack or graze throughout Almost Everyday   Eat extra snacks between meals Almost Everyday   Eat most of my food at the end of the day Once a Week   Eat in the middle of the night or wake up at night to eat Once a Week   Eat extra snacks to prevent or correct low blood sugar Never   Eat to prevent acid reflux or stomach pain A Few Times a Week   Worry about not having enough food to eat Never   I eat when I am depressed Never   I eat when I am stressed Never   I eat when I am bored A Few Times a Week   I eat when I am anxious Less Than Weekly   I eat when I am happy or as a  reward Less Than Weekly   I feel hungry all the time even if I just have eaten Never   Feeling full is important to me Never   I finish all the food on my plate even if I am already full Less Than Weekly   I can't resist eating delicious food or walk past the good food/smell Less Than Weekly   I eat/snack without noticing that I am eating Never   I eat when I am preparing the meal Never   I eat more than usual when I see others eating Never   I have trouble not eating sweets, ice cream, cookies, or chips if they are around the house Never   I think about food all day Never   What foods, if any, do you crave? Cheese   Please list any other foods you crave? Pizza           9/9/2024     4:11 PM   Amount of Food   I feel out of control when eating Never   I eat a large amount of food, like a loaf of bread, a box of cookies, a pint/quart of ice cream, all at once Never   I eat a large amount of food even when I am not hungry Never   I eat rapidly Never   I eat alone because I feel embarrassed and do not want others to see how much I have eaten Never   I eat until I am uncomfortably full Monthly   I feel bad, disgusted, or guilty after I overeat Monthly           9/9/2024     4:11 PM   Activity/Exercise History   How much of a typical 12 hour day do you spend sitting? Most of the Day   How much of a typical 12 hour day do you spend lying down? Half the Day   How much of a typical day do you spend walking/standing? Less Than Half the Day   How many hours (not including work) do you spend on the TV/Video Games/Computer/Tablet/Phone? 4-5 Hours   How many times a week are you active for the purpose of exercise? 2-3 Times a Week   What keeps you from being more active? Pain    Shortness of Breath    Lack of Time    Too tired   How many total minutes do you spend doing some activity for the purpose of exercising when you exercise? More Than 30 Minutes     For the past 2 weeks has been exercising 4 times weekly on treadmill for  20 minutes and some weight toning. Walking more  Prior to the 2 weeks it had been years    PAST MEDICAL HISTORY:  Past Medical History:   Diagnosis Date    Closed fracture of nasal bones 04/22/2020    Last Assessment & Plan:   Formatting of this note might be different from the original.  Overall the patient appears to be healing quite nicely.  She no longer has her splint on.  I told her she needs to be careful as up to 6 weeks need to past before her nasal bones are likely to stay in place completely with any significant trauma.  I did explain that any fracture is more likely to refracture co    Depressive disorder 1999    Thyroid nodule - needs yearly thyroid US x5 years 7/16/2024 9/9/2024     4:11 PM   Work/Social History Reviewed With Patient   My employment status is Full-Time   My job is    How much of your job is spent on the computer or phone? 75%   How many hours do you spend commuting to work daily? 0   What is your marital status? /In a Relationship   If in a relationship, is your significant other overweight? Yes   If you have children, are they overweight? No   Who do you live with? Spouse   Who does the food shopping? Me       Social History     Tobacco Use    Smoking status: Never    Smokeless tobacco: Never   Vaping Use    Vaping status: Never Used   Substance Use Topics    Alcohol use: Yes     Comment: occassional    Drug use: Never            9/9/2024     4:11 PM   Mental Health History Reviewed With Patient   Have you ever been physically or sexually abused? No   How often in the past 2 weeks have you felt little interest or pleasure in doing things? For Several Days   Over the past 2 weeks how often have you felt down, depressed, or hopeless? More Than Half the Days           9/9/2024     4:11 PM   Sleep History Reviewed With Patient   How many hours do you sleep at night? 9       MEDICATIONS:   Current Outpatient Medications   Medication Sig Dispense Refill     esomeprazole (NEXIUM) 20 MG DR capsule Take 20 mg by mouth 2 times daily. Take 30-60 minutes before eating.      sertraline (ZOLOFT) 50 MG tablet Take 1 tablet (50 mg) by mouth daily 90 tablet 1    LORazepam (ATIVAN) 0.5 MG tablet Take 1 tablet (0.5 mg) by mouth every 6 hours as needed for anxiety (Patient not taking: Reported on 9/13/2024) 10 tablet 0       ALLERGIES:   Allergies   Allergen Reactions    Azithromycin Hives    Codeine Other (See Comments)     Drowsiness, severe    Tramadol Other (See Comments)     Confusion    Cefaclor Rash    Sulfa Antibiotics Hives and Rash       ROS:  HEENT  H/O glaucoma:  no  Cardiovascular  CAD:   no  Palpitations:   no  HTN:    no  Gastrointestinal  GERD:   Yes - sees GI. Has pretty bad on daily medicaton   Constipation:   no  Liver Dz:   no  H/O Pancreatitis:  no  H/O Gallbladder Dz: Removed   Psychiatric  Moods Stable:  yes  Anxiety:   Yes - on medications   Depression:  no  Bipolar:  no  H/O ETOH/Drug Use: no  H/O eating disorder: no  Endocrine  PMH/FMH of MTC or MEN2:  no  Neurologic:  H/O seizures:   no  Headaches:  Yes as a child. Not as an adult   Memory Impairment:  no    H/O kidney stones:  no  Kidney disease:  no  Current birth control:  No       LABS/RECORDS REVIEWED:  Hemoglobin A1C   Date Value Ref Range Status   03/27/2023 5.8 (H) 0.0 - 5.6 % Final     Comment:     Normal <5.7%   Prediabetes 5.7-6.4%    Diabetes 6.5% or higher     Note: Adopted from ADA consensus guidelines.     Vitamin D, Total (25-Hydroxy)   Date Value Ref Range Status   07/18/2023 30 20 - 75 ug/L Final     TSH   Date Value Ref Range Status   03/27/2023 1.53 0.30 - 4.20 uIU/mL Final     Sodium   Date Value Ref Range Status   07/18/2023 138 136 - 145 mmol/L Final     Potassium   Date Value Ref Range Status   07/18/2023 4.0 3.4 - 5.3 mmol/L Final     Chloride   Date Value Ref Range Status   07/18/2023 104 98 - 107 mmol/L Final     Carbon Dioxide (CO2)   Date Value Ref Range Status   07/18/2023  "23 22 - 29 mmol/L Final     Anion Gap   Date Value Ref Range Status   07/18/2023 11 7 - 15 mmol/L Final     Glucose   Date Value Ref Range Status   07/18/2023 93 70 - 99 mg/dL Final     Urea Nitrogen   Date Value Ref Range Status   07/18/2023 13.2 6.0 - 20.0 mg/dL Final     Creatinine   Date Value Ref Range Status   07/18/2023 0.92 0.51 - 0.95 mg/dL Final     GFR Estimate   Date Value Ref Range Status   07/18/2023 80 >60 mL/min/1.73m2 Final     Calcium   Date Value Ref Range Status   07/18/2023 9.1 8.6 - 10.0 mg/dL Final     Bilirubin Total   Date Value Ref Range Status   03/27/2023 0.4 <=1.2 mg/dL Final     Alkaline Phosphatase   Date Value Ref Range Status   03/27/2023 115 (H) 35 - 104 U/L Final     ALT   Date Value Ref Range Status   03/27/2023 10 10 - 35 U/L Final     AST   Date Value Ref Range Status   03/27/2023 22 10 - 35 U/L Final     Cholesterol   Date Value Ref Range Status   03/27/2023 204 (H) <200 mg/dL Final     Direct Measure HDL   Date Value Ref Range Status   03/27/2023 44 (L) >=50 mg/dL Final     LDL Cholesterol Calculated   Date Value Ref Range Status   03/27/2023 118 (H) <=100 mg/dL Final     Triglycerides   Date Value Ref Range Status   03/27/2023 209 (H) <150 mg/dL Final     WBC Count   Date Value Ref Range Status   07/11/2024 10.6 4.0 - 11.0 10e3/uL Final     Hemoglobin   Date Value Ref Range Status   07/11/2024 12.3 11.7 - 15.7 g/dL Final     Hematocrit   Date Value Ref Range Status   07/11/2024 39.5 35.0 - 47.0 % Final     MCV   Date Value Ref Range Status   07/11/2024 88 78 - 100 fL Final     Platelet Count   Date Value Ref Range Status   07/11/2024 380 150 - 450 10e3/uL Final         BP Readings from Last 6 Encounters:   07/11/24 118/76   03/05/24 126/76   02/11/24 113/75   11/06/23 125/83   10/05/23 131/89   08/25/23 117/75       Pulse Readings from Last 6 Encounters:   07/11/24 80   03/05/24 70   02/11/24 68   11/06/23 103   10/05/23 85   08/25/23 85       PHYSICAL EXAM:  Ht 5' 5\" (1.651 " m)   Wt 220 lb (99.8 kg)   BMI 36.61 kg/m    GENERAL: Healthy, alert and no distress  EYES: Eyes grossly normal to inspection.  No discharge or erythema, or obvious scleral/conjunctival abnormalities.  RESP: No audible wheeze, cough, or visible cyanosis.  No visible retractions or increased work of breathing.    SKIN: Visible skin clear. No significant rash, abnormal pigmentation or lesions.  NEURO: Cranial nerves grossly intact.  Mentation and speech appropriate for age.  PSYCH: Mentation appears normal, affect normal/bright, judgement and insight intact, normal speech and appearance well-groomed.    COUNSELING:   Reviewed obesity as a chronic disease and comprehensive management stratagies.      We discussed Bariatric Basics including:  -eating 3 meals daily  -eating protein first  -eating slowly, chewing food well  -avoiding/limiting calorie containing beverages  -limiting carbohydrates and changing to whole grains  -limiting restaurant or cafeteria eating to twice a week or less    We discussed the importance of restorative sleep and stress management in maintaining a healthy weight.  We discussed insulin resistance and glycemic index as it relates to appetite and weight control.   We discussed the importance of physical activity including cardiovascular and strength training in maintaining a healthier weight and explored viable options.  Patient education of above written in AVS.      Sincerely,    Faith Rich PA-C

## 2024-09-13 ENCOUNTER — VIRTUAL VISIT (OUTPATIENT)
Dept: SURGERY | Facility: CLINIC | Age: 44
End: 2024-09-13
Payer: COMMERCIAL

## 2024-09-13 ENCOUNTER — LAB (OUTPATIENT)
Dept: LAB | Facility: CLINIC | Age: 44
End: 2024-09-13
Payer: COMMERCIAL

## 2024-09-13 VITALS — HEIGHT: 65 IN | BODY MASS INDEX: 36.65 KG/M2 | WEIGHT: 220 LBS

## 2024-09-13 DIAGNOSIS — Z79.899 MEDICATION MANAGEMENT: ICD-10-CM

## 2024-09-13 DIAGNOSIS — K44.9 HIATAL HERNIA WITH GASTROESOPHAGEAL REFLUX DISEASE AND ESOPHAGITIS: ICD-10-CM

## 2024-09-13 DIAGNOSIS — R73.03 PREDIABETES: ICD-10-CM

## 2024-09-13 DIAGNOSIS — K21.00 HIATAL HERNIA WITH GASTROESOPHAGEAL REFLUX DISEASE AND ESOPHAGITIS: ICD-10-CM

## 2024-09-13 DIAGNOSIS — E66.812 CLASS 2 SEVERE OBESITY DUE TO EXCESS CALORIES WITH SERIOUS COMORBIDITY AND BODY MASS INDEX (BMI) OF 36.0 TO 36.9 IN ADULT (H): Primary | ICD-10-CM

## 2024-09-13 DIAGNOSIS — E66.01 CLASS 2 SEVERE OBESITY DUE TO EXCESS CALORIES WITH SERIOUS COMORBIDITY AND BODY MASS INDEX (BMI) OF 36.0 TO 36.9 IN ADULT (H): Primary | ICD-10-CM

## 2024-09-13 LAB — HCG UR QL: NEGATIVE

## 2024-09-13 PROCEDURE — 99205 OFFICE O/P NEW HI 60 MIN: CPT | Mod: 95 | Performed by: PHYSICIAN ASSISTANT

## 2024-09-13 PROCEDURE — 81025 URINE PREGNANCY TEST: CPT

## 2024-09-13 RX ORDER — TOPIRAMATE 25 MG/1
TABLET, FILM COATED ORAL
Qty: 60 TABLET | Refills: 2 | Status: SHIPPED | OUTPATIENT
Start: 2024-09-13

## 2024-09-13 RX ORDER — PHENTERMINE HYDROCHLORIDE 15 MG/1
15 CAPSULE ORAL EVERY MORNING
Qty: 60 CAPSULE | Refills: 0 | Status: SHIPPED | OUTPATIENT
Start: 2024-09-13

## 2024-09-13 NOTE — PATIENT INSTRUCTIONS
"Nice to talk with you today! Thank you for allowing me the privilege of caring for you.   We hope we provided you with the excellent service you deserve.     To ensure the quality of our services you may receive a patient satisfaction survey from an independent monitoring company.  The greatest compliment you can give is \"Likely to Recommend\"      Below is our plan we discussed.-  ERWIN Cuevas      Continue with current exercise plan  Call insurance to see if you have coverage for wegovy or zepbound  Get in at least 50 oz of water daily   Start phentermine 15 mg daily as well as topiramate 50 mg (will be prescribed after negative pregnancy test)  Will get blood pressure, weight and pulse at nurse visit 2 weeks after starting phentermine    Please call [phone] and schedule a follow up in 3 months.  If you need to reach me sooner you can do so by calling [phone].    Have a great day!                             .  Here is some information about the medication we discussed.  Please have your blood pressure and pulse checked 7-10 days after starting and let clinic know if your blood pressure goes above 140/90 or pulse greater than 100 bpm!        MEDICATION STARTED AT THIS APPOINTMENT    We are starting Phentermine. Take one tablet in the morning. Contact the nurse via [facility] or call [phone] if you have any questions or concerns. (Do not stop taking it if you don't think it's working. For some people it works without them knowing it.)    Phentermine is being prescribed because you identified hunger as one of the main causes for your extra weight.      Our patients on Phentermine find that they:    >feel less hunger    >find it easier to push the plate away   >have an easier time eating less    For some of our patients, these feelings are very real and immediate. For other patients, the feelings are less obvious. They don't feel much of a change but find they've lost weight. Like all weight loss " medications, Phentermine  works best when you help it work. This means:  1. Having less tempting high calorie (fattening) food around the house or office. (For people with strong cravings this is very important.)   2. Staying away from situations or people that may trigger your cravings .   3. Eating out only one time or less each week.  4. Eating your meals at a table with the TV or computer off.    Side-effects. Phentermine is generally well tolerated. The main side-effects we see are feelings of racing pulse or rapid heart beat. Some people can get an elevated blood pressure. Because of this we may have you come back within a week or so of starting the medication for a blood pressure check.         In order to get refills of this or any medication we prescribe you must be seen in the medical weight mgmt clinic every 2-3 months.        MEDICATION STARTED AT THIS APPOINTMENT  We are starting topiramate at bedtime.  Start one tab, 25 mg, for a week. Go up to 50 mg (2 tabs) for the next week. Stay at 2 tabs until you are seen again. Call the nurse at [phone] if you have any questions or concerns. (Do not stop taking it if you don't think it's working. For some people it works even though they do not feel much different.)    Topiramate (Topamax) is a medication that is used most often to treat migraine headaches or for seizures. It has also been found to help with weight loss. Although it's not currently FDA approved for weight loss, it has been used safely for a number of years to help people who are carrying extra weight.     Just how topiramate helps with weight loss has not been exactly determined. However it seems to work on areas of the brain to quiet down signals related to eating.      Topiramate may make you:    >feel less interest in eating in between meals   >think less about food and eating   >find it easier to push the plate away   >find giving up pop easier    >have an easier time eating less    For  some of our patients, the pills work right away. They feel and think quite differently about food. Other patients don't feel much of a change but find in fact they have lost weight! Like all weight loss medications, topiramate works best when you help it work.  This means:    1) Have less tempting high calorie (fattening) food around the house or office    2) Have lower calorie food (fruits, vegetables,low fat meats and dairy) for snacks    3) Eat out only one time or less each week.   4) Eat your meals at a table with the TV or computer off.    Side-effects. Topiramate is generally well tolerated. The main side-effects we see are:   Tingling in hands,feet, or face (usually not very troublesome)   Mental confusion and word finding trouble (about 10% of patients have this.)     Feeling sleepy or a bit dopey- this goes away very soon after starting.    One of the dangers of topiramate is the possibility of birth defects--if you get pregnant when you are on it, there is the risk that your baby will be born with a cleft lip or palate.  If you are on topiramate and of child bearing age, you need to be on a reliable form of birth control or refrain from sexual intercourse.     Please refer to the pharmacy insert for more information on side-effects. Since many pharmacists are not familiar with the use of topiramate in weight loss, calling the clinic will get you the most accurate information on the use of this medication for weight loss.    In order to get refills of this or any medication we prescribe you must be seen in the medical weight mgmt clinic every 2-3 months.     If you have decided not to continue use of topiramate, it is important for you to decrease your dose slowly to avoid risk of seizures.  Please decrease your dose as follows:  25 mg daily for 3 days  Then stop

## 2024-09-16 ENCOUNTER — PATIENT OUTREACH (OUTPATIENT)
Dept: CARE COORDINATION | Facility: CLINIC | Age: 44
End: 2024-09-16
Payer: COMMERCIAL

## 2024-10-16 ENCOUNTER — ALLIED HEALTH/NURSE VISIT (OUTPATIENT)
Dept: FAMILY MEDICINE | Facility: CLINIC | Age: 44
End: 2024-10-16
Payer: COMMERCIAL

## 2024-10-16 VITALS — BODY MASS INDEX: 35.16 KG/M2 | WEIGHT: 211.3 LBS

## 2024-10-16 DIAGNOSIS — R63.5 WEIGHT GAIN: Primary | ICD-10-CM

## 2024-10-16 PROCEDURE — 99207 PR NO CHARGE NURSE ONLY: CPT

## 2024-11-13 ENCOUNTER — VIRTUAL VISIT (OUTPATIENT)
Dept: GASTROENTEROLOGY | Facility: CLINIC | Age: 44
End: 2024-11-13
Attending: PHYSICIAN ASSISTANT
Payer: COMMERCIAL

## 2024-11-13 VITALS — HEIGHT: 66 IN | BODY MASS INDEX: 32.47 KG/M2 | WEIGHT: 202 LBS

## 2024-11-13 DIAGNOSIS — K21.00 GASTROESOPHAGEAL REFLUX DISEASE WITH ESOPHAGITIS WITHOUT HEMORRHAGE: ICD-10-CM

## 2024-11-13 DIAGNOSIS — K44.9 HIATAL HERNIA: Primary | ICD-10-CM

## 2024-11-13 ASSESSMENT — PAIN SCALES - GENERAL: PAINLEVEL_OUTOF10: NO PAIN (0)

## 2024-11-13 NOTE — LETTER
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 past medical history pertinent for history of cocaine abuse, anxiety, depression, hiatal hernia and iron deficiency anemia who is presenting as a follow-up patient who was previously seen by Dr. Almodovar with a chief complaint of GERD.    Prior Evaluation Includes:     EGD 6/20/2023 notable for 5 cm hiatal hernia, the GE flap valve was described as Hill grade 4.  There were no gross lesions within the entirety of the stomach or small bowel.  Duodenal biopsies were negative for any  significant histopathological abnormalities.  There is no evidence of celiac disease.  Gastric biopsies with mild chronic inflammation.  Negative for H. pylori and test metaplasia.    Colonoscopy 6/20/2023 to the terminal ileum was unremarkable.  No biopsies were obtained.    7/17/2023 consultation with thoracic surgery Dr. Anderson who recommended that patient was not currently a good surgical candidate secondary to her BMI (suggested 20 pound weight loss before considering surgery).  Recommendations were for patient to focus  on weight loss with eventual hiatal hernia repair and consideration of bariatric surgery with Dr. Brown.    7/24/2023 patient was seen by her primary care provider for follow-up of her anemia.  Labs including homocystine, methylmalonic acid, protein electrophoresis, lactate dehydrogenase, B12, vitamin D and folate were unremarkable.  Iron studies were notable for low total iron and iron saturation index with mild improvement from 6 months prior.  Peripheral smear was notable for microcytic and hypochromic morphology. She was sequently prescribed Venofer 300 mg and had completed 3/3 infusions.    9/13/2024 consultation with medical weight management    #Hiatal Hernia - 5 cm   #GERD   #Iron Deficiency  #Obesity BMI 36.61 --> 32.60  #Constipation - Medication Induced     Huy presents today in follow-up for reflux disease in the setting of a moderate to large size hiatal hernia.  Previously she was experiencing breakthrough symptoms of regurgitation occurring at least 4 times per week despite use of Nexium 40 mg twice daily.  She has since met with medical weight management now on phentermine/topiramate and has made significant lifestyle modifications which has resulted in an approximate 30 pound weight loss now with a BMI of 32.60.  Her current acid suppressive regimen consists of Nexium 20 mg twice daily with the as needed use of famotidine 20 mg once or twice per week for breakthrough symptoms.     In regards to Huy's history of iron deficiency this is likely driven by her restricted diet for which she has been asked to continue to follow-up with her PCP in regards to replacement therapy.  Additional considerations would be to complete a capsule endoscopy. However, endoscopic placement should be considered in setting of a hiatal hernia. Additionally Jose Pelletier lesions could be contributing from within the hernial sac which cannot always be appreciated on endoscopy.     - Continue to work with medical weight  management   - Refrain from consumption of carbonated beverages   - Continue Nexium 20 mg twice daily which is best taken on empty stomach 30-60 minutes prior to meals  - It is okay to use Famotidine/Pepcid 10 mg or 20 mg once daily as needed for breakthrough symptoms  - Reflux lifestyle modifications as directed within the AVS  - Please follow-up with the cardiothoracic surgical team for consideration of hiatal hernia repair  - If anemia persists could would then consider a capsule endoscopy to rule out small bowel etiology of anemia   - Start MiraLAX 17g (1 capful) daily this can be increased to twice daily dosing if needed    #Colorectal Cancer Screening   Colonoscopy 6/2023 that was unremarkable. Family history pertinent for great grandmother had colon cancer and great uncles with possible colon cancer as well. Per performing endoscopist recall colon cancer screening can be completed in 10 years or sooner if otherwise indicated.                                                                                                  Follow up plan:   Return to clinic 6 months and as needed.    The risks and benefits of my recommendations, as well as other treatment options were discussed with the patient and any available family today. All questions were answered.     Follow up: As planned above. Today, I personally spent 15 minutes in direct face to face time with the patient, of which greater than 50% of the time was spent in patient education and counseling as described above. Approximately 9 minutes were spent on indirect care associated with the patient's consultation including but not limited to review of: patient medical records to date, clinic visits, hospital records, lab results, imaging studies, procedural documentation, and coordinating care with other providers. The findings from this review are summarized in the above note. All of the above accounted for a cumulative time of 24 minutes and was performed on  the date of service.     The patient verbalized understanding of the plan and was appreciative for the time spent and information provided during the office visit.           Katalina Shahid PA-C  Division of Gastroenterology, Hepatology, and Nutrition  [facility]       Documentation assisted by voice recognition and documentation system.            Again, thank you for allowing me to participate in the care of your patient.        Sincerely,        Katalina Shahid PA-C

## 2024-11-13 NOTE — PATIENT INSTRUCTIONS
It was a pleasure meeting with you today and discussing your healthcare plan. Below is a summary of what we covered:    GREAT WORK!    - Continue to work with medical weight management   - Refrain from consumption of carbonated beverages   - Continue Nexium 20 mg twice daily which is best taken on empty stomach 30-60 minutes prior to meals  - It is okay to use famotidine/Pepcid 10 mg or 20 mg once daily as needed for breakthrough symptoms  - Reflux lifestyle modifications as directed within the AVS  - Please follow-up with the cardiothoracic surgical team for consideration of hiatal hernia repair  - If anemia persists could would then consider a capsule endoscopy to rule out small bowel etiology of anemia   - Start MiraLAX 17g (1 capful) daily this can be increased to twice daily dosing if needed      Gastroesophageal Reflux Disease (GERD) Lifestyle Modifications:   If taking acid suppression therapy (PPI ie Pantoprazole, Lansoprazole, Omeprazole, Esomeprazole, Rabeprazole, Dexlansoprazole) it should be taken 30 - 60 minutes prior to meals on an empty stomach to have maximum effect  Avoid triggers for reflux such as coffee, chocolate, carbonated beverages, spicy foods, acidic foods (tomato based/citrus and foods with high fat content   Abstinence from alcohol and cessation of all tobacco products is recommended   Studies have shown that weight loss, exercise and maintaining a healthy BMI significantly reduce GERD symptoms   Remain upright while eating and immediately after meals  Do not eat or drink at least 3 hours prior to laying down supine/laying down for bed   Avoid late night/middle of the night snacking    Consider obtaining a wedge pillow or elevating the head end of the bed while sleeping   Avoid sleeping right side down as this can place the lower part of the esophagus/lower esophageal sphincter in a dependent position that favors reflux   Attempting to eat smaller more frequent meals may improve  symptoms         Please call my nurse Tara ([phone]) with any questions or concerns.      See below for any additional questions and scheduling guidelines.    Sign up for [employer]: [employer] patient portal serves as a secure platform for accessing your medical records from the [facility]. Additionally, [employer] facilitates easy, timely, and secure messaging with your care team. If you have not signed up, you may do so by using the provided code or calling [phone].    Coordinating your care after your visit:  There are multiple options for scheduling your follow-up care based on your provider's recommendation.    How do I schedule a follow-up clinic appointment:   After your appointment, you may receive scheduling assistance with the Clinic Coordinators by having a seat in the waiting room and a Clinic Coordinator will call you up to schedule.  Virtual visits or after you leave the clinic:  Your provider has placed a follow-up order in the [employer] portal for scheduling your return appointment. A member of the scheduling team will contact you to schedule.  [facility] Scheduling: Timely scheduling through [employer] is advised to ensure appointment availability.   Call to schedule: You may schedule your follow-up appointment(s) by calling [phone], option 1.    How do I schedule my endoscopy or colonoscopy procedure:  If a procedure, such as a colonoscopy or upper endoscopy was ordered by your provider, the scheduling team will contact you to schedule this procedure. Or you may choose to call to schedule at   [phone], option 2.  Please allow 20-30 minutes when scheduling a procedure.    How do I get my blood work done? To get your blood work done, you need to schedule a lab appointment at an [facility]. There are multiple ways to schedule:   At the clinic: The Clinic Coordinator you meet after your visit can help you schedule a lab appointment.   [employer] scheduling: [employer]  offers online lab scheduling at all [facility] laboratory locations.   Call to schedule: You can call [phone] to schedule your lab appointment.    How do I schedule my imaging study: To schedule imaging studies, such as CT scans, ultrasounds, MRIs, or X-rays, contact Imaging Services at [phone].    How do I schedule a referral to another doctor: If your provider recommended a referral to another specialist(s), the referral order was placed by your provider. You will receive a phone call to schedule this referral, or you may choose to call the number attached to the referral to self-schedule.    For Post-Visit Question(s):  For any inquiries following today's visit:  Please utilize [employer] messaging and allow 48 hours for reply or contact the Call Center during normal business hours at [phone], option 3.  For Emergent After-hours questions, contact the On-Call GI Fellow through the [facility] at [phone].  In addition, you may contact your Nurse directly using the provided contact information.    Test Results:  Test results will be accessible via [employer] in compliance with the 21st Century Cures Act. This means that your results will be available to you at the same time as your provider. Often you may see your results before your provider does. Results are reviewed by staff within two weeks with communication follow-up. Results may be released in the patient portal prior to your care team review.    Prescription Refill(s):  Medication prescribed by your provider will be addressed during your visit. For future refills, please coordinate with your pharmacy. If you have not had a recent clinic visit or routine labs, for your safety, your provider may not be able to refill your prescription.         Sincerely,    Katalina Shahid PA-C  Division of Gastroenterology, Hepatology, and Nutrition  [facility]

## 2024-11-13 NOTE — PROGRESS NOTES
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 Includes:     EGD 6/20/2023 notable for 5 cm hiatal hernia, the GE flap valve was described as Hill grade 4.  There were no gross lesions within the entirety of the stomach or small bowel.  Duodenal biopsies were negative for any  significant histopathological abnormalities.  There is no evidence of celiac disease.  Gastric biopsies with mild chronic inflammation.  Negative for H. pylori and test metaplasia.    Colonoscopy 6/20/2023 to the terminal ileum was unremarkable.  No biopsies were obtained.    7/17/2023 consultation with thoracic surgery Dr. Anderson who recommended that patient was not currently a good surgical candidate secondary to her BMI (suggested 20 pound weight loss before considering surgery).  Recommendations were for patient to focus on weight loss with eventual hiatal hernia repair and consideration of bariatric surgery with Dr. Brown.    7/24/2023 patient was seen by her primary care provider for follow-up of her anemia.  Labs including homocystine, methylmalonic acid, protein electrophoresis, lactate dehydrogenase, B12, vitamin D and folate were unremarkable.  Iron studies were  notable for low total iron and iron saturation index with mild improvement from 6 months prior.  Peripheral smear was notable for microcytic and hypochromic morphology. She was sequently prescribed Venofer 300 mg and had completed 3/3 infusions.    9/13/2024 consultation with medical weight management    #Hiatal Hernia - 5 cm   #GERD   #Iron Deficiency  #Obesity BMI 36.61 --> 32.60  #Constipation - Medication Induced     Huy presents today in follow-up for reflux disease in the setting of a moderate to large size hiatal hernia.  Previously she was experiencing breakthrough symptoms of regurgitation occurring at least 4 times per week despite use of Nexium 40 mg twice daily.  She has since met with medical weight management now on phentermine/topiramate and has made significant lifestyle modifications which has resulted in an approximate 30 pound weight loss now with a BMI of 32.60.  Her current acid suppressive regimen consists of Nexium 20 mg twice daily with the as needed use of famotidine 20 mg once or twice per week for breakthrough symptoms.     In regards to Huy's history of iron deficiency this is likely driven by her restricted diet for which she has been asked to continue to follow-up with her PCP in regards to replacement therapy.  Additional considerations would be to complete a capsule endoscopy. However, endoscopic placement should be considered in setting of a hiatal hernia. Additionally Jose Pelletier lesions could be contributing from within the hernial sac which cannot always be appreciated on endoscopy.     - Continue to work with medical weight management   - Refrain from consumption of carbonated beverages   - Continue Nexium 20 mg twice daily which is best taken on empty stomach 30-60 minutes prior to meals  - It is okay to use Famotidine/Pepcid 10 mg or 20 mg once daily as needed for breakthrough symptoms  - Reflux lifestyle modifications as directed within the AVS  - Please follow-up with  the cardiothoracic surgical team for consideration of hiatal hernia repair  - If anemia persists could would then consider a capsule endoscopy to rule out small bowel etiology of anemia   - Start MiraLAX 17g (1 capful) daily this can be increased to twice daily dosing if needed    #Colorectal Cancer Screening   Colonoscopy 6/2023 that was unremarkable. Family history pertinent for great grandmother had colon cancer and great uncles with possible colon cancer as well. Per performing endoscopist recall colon cancer screening can be completed in 10 years or sooner if otherwise indicated.                                                                                                  Follow up plan:   Return to clinic 6 months and as needed.    The risks and benefits of my recommendations, as well as other treatment options were discussed with the patient and any available family today. All questions were answered.     Follow up: As planned above. Today, I personally spent 15 minutes in direct face to face time with the patient, of which greater than 50% of the time was spent in patient education and counseling as described above. Approximately 9 minutes were spent on indirect care associated with the patient's consultation including but not limited to review of: patient medical records to date, clinic visits, hospital records, lab results, imaging studies, procedural documentation, and coordinating care with other providers. The findings from this review are summarized in the above note. All of the above accounted for a cumulative time of 24 minutes and was performed on the date of service.     The patient verbalized understanding of the plan and was appreciative for the time spent and information provided during the office visit.           Katalina Shahid PA-C  Division of Gastroenterology, Hepatology, and Nutrition  [facility]       Documentation assisted by voice recognition and documentation  system.

## 2024-11-13 NOTE — NURSING NOTE
Current patient location: [address] [place] [id number]    Is the patient currently in the state of [place]? YES    Visit mode:VIDEO    If the visit is dropped, the patient can be reconnected by:VIDEO VISIT: Text to cell phone:   Telephone Information:   Mobile [phone]       Will anyone else be joining the visit? NO  (If patient encounters technical issues they should call [phone]    Are changes needed to the allergy or medication list? No    Are refills needed on medications prescribed by this physician? NO    Rooming Documentation:  Questionnaire(s) completed Patient will complete questionnaire(s) in [facility]    Reason for visit: Video Visit (Recheck)    Vy LOUIEF

## 2024-11-26 ENCOUNTER — MYC MEDICAL ADVICE (OUTPATIENT)
Dept: FAMILY MEDICINE | Facility: CLINIC | Age: 44
End: 2024-11-26
Payer: COMMERCIAL

## 2024-12-28 NOTE — PROGRESS NOTES
THORACIC SURGERY - NEW PATIENT OFFICE VISIT       Dear VERONIKA Keith,     I saw Huy Coles at Dr. Almodovar s request in consultation for the evaluation and treatment of a hiatal hernia.      HPI  Huy Coles is a 44 who I first met on 7/17/2023.    On 7/17/2023: At that time, she had  with long-standing acid reflux in the setting of a hiatal hernia. She first had an upper endoscopy 2006 or so and was told about the hernia and had mild esophagitis. She had been taking Dexilant, which controlled her symptoms, but had to stop taking it about 10 years ago. She takes omeprazole twice daily with TUMS (minimal) and pepcid (3 or 4 times weekly). She has heartburn, regurgitation, vomiting, reflux, cough and chest discomfort. She wakes up choking/vomiting at night. She avoids coffee and chocolate. She drinks lots of soda. She has not lost weight over the 20 years.  She walk two blocks without stopping or climb a flight of stairs. She has bowel movements 3-4 times weekly.     Today, 12/30/2024: She has worked with the weight loss management program and is down to 192 pounds, a BMI of 31.0. Her symptoms have improved with fewer breakthrough episodes, maybe once a week. She is taking Nexium twice daily.      Previsit Tests   CT chest 7/7/2023:    Large hiatal hernia     EGD 6/20/2023:  5 cm hiatal hernia; Hill Grade IV gastroesophageal flap valve     EGD 10/7/2020: 4 cm hiatal hernia ,mild esophagitis     PMH  Reviewed, as below    Past Medical History:   Diagnosis Date    Closed fracture of nasal bones 04/22/2020    Last Assessment & Plan:   Formatting of this note might be different from the original.  Overall the patient appears to be healing quite nicely.  She no longer has her splint on.  I told her she needs to be careful as up to 6 weeks need to past before her nasal bones are likely to stay in place completely with any significant trauma.  I did explain that any fracture is more likely to refracture co    Depressive  "disorder 1999    Thyroid nodule - needs yearly thyroid US x5 years 7/16/2024         PSH  Reviewed, as below     Past Surgical History:   Procedure Laterality Date    CHOLECYSTECTOMY  2006    COLONOSCOPY N/A 6/20/2023    Procedure: Colonoscopy;  Surgeon: Danny Almodovar MD;  Location:  GI    ENT SURGERY  1998    Tonsills and adenoids removed, sinus surgery    ESOPHAGOSCOPY, GASTROSCOPY, DUODENOSCOPY (EGD), COMBINED N/A 6/20/2023    Procedure: Esophagoscopy, gastroscopy, duodenoscopy (EGD), combined;  Surgeon: Danny Almodovar MD;  Location:  GI    ORTHOPEDIC SURGERY  2020         ETOH:  Rare  TOB:  Never smoker  Other drugs: None currently     Physical examination  Ht 1.67 m (5' 5.75\")  Wt 87.1 kg (192 lb)  BMI 31.0     From a personal perspective, she lives with her . She has a son who lives with his father. She is a .      IMPRESSION  (K44.9,  K21.00) Hiatal hernia with gastroesophageal reflux disease and esophagitis  (primary encounter diagnosis)        This person is a 44 year old female with a symptomatic giant hiatal hernia. She is a good surgical candidate at this time with her improved BMI of 31.0.      PLAN  I spent 20 min on the date of the encounter in chart review, patient visit, review of tests, documentation and/or discussion with other providers about the issues documented above. I reviewed the plan as follows:     Procedure:  Robot-assisted hiatal hernia repair, fundoplication, possible gastroplasty, possible gastrostomy     The risks include, but are not limited to the following.  There is a risk of injury to the stomach, esophagus or abdominal contents.  There is a risk of injury to the vagus nerves, which could result in functional emptying issues for the stomach.  There is a risk of problems with the wrap, which can include difficulty swallowing, persistent reflux and pain.  Coughing or retching/vomiting in the weeks after surgery can result in breakdown of the repair.  " "If the esophagus does not reach the abdominal cavity, a \"new\" esophagus will be made out of the top of the stomach.  This gastroplasty can dilate over time, causing new swallowing difficulties, and continues to produce acid.  The wrap may loosen over time and need to be revised with a new operation.  The lung cavities may be entered and tubes placed to evacuate air and fluid.   Finally, there is a risk of death.        Necessary Preop Tests & Appointments: Preoperative assessment clinic; CT chest     Regional Anesthesia Plan: None     Anticoagulation Plan: Prophylactic Lovenox        I appreciate the opportunity to participate in the care of your patient and will keep you updated.        Sincerely,    Lew Anderson MD   "

## 2024-12-30 ENCOUNTER — PREP FOR PROCEDURE (OUTPATIENT)
Dept: SURGERY | Facility: CLINIC | Age: 44
End: 2024-12-30

## 2024-12-30 ENCOUNTER — VIRTUAL VISIT (OUTPATIENT)
Dept: ONCOLOGY | Facility: CLINIC | Age: 44
End: 2024-12-30
Attending: THORACIC SURGERY (CARDIOTHORACIC VASCULAR SURGERY)
Payer: COMMERCIAL

## 2024-12-30 VITALS — WEIGHT: 192 LBS | BODY MASS INDEX: 30.99 KG/M2

## 2024-12-30 DIAGNOSIS — K21.9 HIATAL HERNIA WITH GASTROESOPHAGEAL REFLUX: Primary | ICD-10-CM

## 2024-12-30 DIAGNOSIS — K21.00 HIATAL HERNIA WITH GASTROESOPHAGEAL REFLUX DISEASE AND ESOPHAGITIS: Primary | ICD-10-CM

## 2024-12-30 DIAGNOSIS — K44.9 HIATAL HERNIA WITH GASTROESOPHAGEAL REFLUX: Primary | ICD-10-CM

## 2024-12-30 DIAGNOSIS — K44.9 HIATAL HERNIA WITH GASTROESOPHAGEAL REFLUX DISEASE AND ESOPHAGITIS: Primary | ICD-10-CM

## 2024-12-30 PROCEDURE — 99214 OFFICE O/P EST MOD 30 MIN: CPT | Mod: 95 | Performed by: THORACIC SURGERY (CARDIOTHORACIC VASCULAR SURGERY)

## 2024-12-30 RX ORDER — ENOXAPARIN SODIUM 100 MG/ML
40 INJECTION SUBCUTANEOUS
OUTPATIENT
Start: 2024-12-30

## 2024-12-30 ASSESSMENT — PAIN SCALES - GENERAL: PAINLEVEL_OUTOF10: NO PAIN (0)

## 2024-12-30 NOTE — NURSING NOTE
Is the patient currently in the state of [place]? YES    Current patient location: [address] [place] [id number]    Visit mode:VIDEO    If the visit is dropped, the patient can be reconnected by: VIDEO VISIT:  Send e-mail to at [email or web address]    Will anyone else be joining the visit? No  (If patient encounters technical issues they should call [phone])    Are changes needed to the allergy or medication list? No    Are refills needed on medications prescribed by this physician? No    Rooming Documentation: Questionnaire(s) completed. and Unable to complete qnrs due to time.    Reason for visit: RECHECK     SHABBIR Pierce

## 2024-12-30 NOTE — LETTER
12/30/2024      Huy Glover  [address]  [facility] [address]      Dear Colleague,    Thank you for referring your patient, Huy Glover, to the [facility]. Please see a copy of my visit note below.          THORACIC SURGERY - NEW PATIENT OFFICE VISIT       Dear VERONIKA Keith,     I saw Huy Colse at Dr. Almodovar s request in consultation for the evaluation and treatment of a hiatal hernia.      HPI  Huy Coles is a 44 who I first met on 7/17/2023.    On 7/17/2023: At that time, she had  with long-standing acid reflux in the setting of a hiatal hernia. She first had an upper endoscopy 2006 or so and was told about the hernia and had mild esophagitis. She had been taking Dexilant, which controlled her symptoms, but had to stop taking it about 10 years ago. She takes omeprazole twice daily with TUMS (minimal) and pepcid (3 or 4 times weekly). She has heartburn, regurgitation, vomiting, reflux, cough and chest discomfort. She wakes up choking/vomiting at night. She avoids coffee and chocolate. She drinks lots of soda. She has not lost weight over the 20 years.  She walk two blocks without stopping or climb a flight of stairs. She has bowel movements 3-4 times weekly.     Today, 12/30/2024: She has worked with the weight loss management program and is down to 192 pounds, a BMI of 31.0. Her symptoms have improved with fewer breakthrough episodes, maybe once a week. She is taking Nexium twice daily.      Previsit Tests   CT chest 7/7/2023:    Large hiatal hernia     EGD 6/20/2023:  5 cm hiatal hernia; Hill Grade IV gastroesophageal flap valve     EGD 10/7/2020: 4 cm hiatal hernia ,mild esophagitis     PMH  Reviewed, as below    Past Medical History:   Diagnosis Date     Closed fracture of nasal bones 04/22/2020    Last Assessment & Plan:   Formatting of this note might be different from the original.  Overall the patient appears to be healing quite nicely.  She no longer has her  "splint on.  I told her she needs to be careful as up to 6 weeks need to past before her nasal bones are likely to stay in place completely with any significant trauma.  I did explain that any fracture is more likely to refracture co     Depressive disorder 1999     Thyroid nodule - needs yearly thyroid US x5 years 7/16/2024         PSH  Reviewed, as below     Past Surgical History:   Procedure Laterality Date     CHOLECYSTECTOMY  2006     COLONOSCOPY N/A 6/20/2023    Procedure: Colonoscopy;  Surgeon: Danny Almodovar MD;  Location:  GI     ENT SURGERY  1998    Tonsills and adenoids removed, sinus surgery     ESOPHAGOSCOPY, GASTROSCOPY, DUODENOSCOPY (EGD), COMBINED N/A 6/20/2023    Procedure: Esophagoscopy, gastroscopy, duodenoscopy (EGD), combined;  Surgeon: Danny Almodovar MD;  Location:  GI     ORTHOPEDIC SURGERY  2020         ETOH:  Rare  TOB:  Never smoker  Other drugs: None currently     Physical examination  Ht 1.67 m (5' 5.75\")  Wt 87.1 kg (192 lb)  BMI 31.0     From a personal perspective, she lives with her . She has a son who lives with his father. She is a .      IMPRESSION  (K44.9,  K21.00) Hiatal hernia with gastroesophageal reflux disease and esophagitis  (primary encounter diagnosis)        This person is a 44 year old female with a symptomatic giant hiatal hernia. She is a good surgical candidate at this time with her improved BMI of 31.0.      PLAN  I spent 20 min on the date of the encounter in chart review, patient visit, review of tests, documentation and/or discussion with other providers about the issues documented above. I reviewed the plan as follows:     Procedure:  Robot-assisted hiatal hernia repair, fundoplication, possible gastroplasty, possible gastrostomy     The risks include, but are not limited to the following.  There is a risk of injury to the stomach, esophagus or abdominal contents.  There is a risk of injury to the vagus nerves, which could result in " "functional emptying issues for the stomach.  There is a risk of problems with the wrap, which can include difficulty swallowing, persistent reflux and pain.  Coughing or retching/vomiting in the weeks after surgery can result in breakdown of the repair.  If the esophagus does not reach the abdominal cavity, a \"new\" esophagus will be made out of the top of the stomach.  This gastroplasty can dilate over time, causing new swallowing difficulties, and continues to produce acid.  The wrap may loosen over time and need to be revised with a new operation.  The lung cavities may be entered and tubes placed to evacuate air and fluid.   Finally, there is a risk of death.        Necessary Preop Tests & Appointments: Preoperative assessment clinic; CT chest     Regional Anesthesia Plan: None     Anticoagulation Plan: Prophylactic Lovenox        I appreciate the opportunity to participate in the care of your patient and will keep you updated.        Sincerely,    Lew Anderson MD     Virtual Visit Details    Type of service:  Video Visit     Originating Location (pt. Location): {video visit patient location:423335::\"Home\"}  {PROVIDER LOCATION On-site should be selected for visits conducted from your clinic location or adjoining [facility] hospital, academic office, or other nearby [facility] building. Off-site should be selected for all other provider locations, including home:[id number]}  Distant Location (provider location):  {virtual location provider:[id number]}  Platform used for Video Visit: {Virtual Visit Platforms:661906::\"[employer]\"}      Again, thank you for allowing me to participate in the care of your patient.        Sincerely,        Lew Anderson MD    Electronically signed"

## 2024-12-30 NOTE — PROGRESS NOTES
Virtual Visit Details    Type of service:  Video Visit     Originating Location (pt. Location): Home    Distant Location (provider location):  On-site  Platform used for Video Visit: Gaurav

## 2025-01-09 ENCOUNTER — TELEPHONE (OUTPATIENT)
Dept: ONCOLOGY | Facility: CLINIC | Age: 45
End: 2025-01-09
Payer: COMMERCIAL

## 2025-01-09 NOTE — TELEPHONE ENCOUNTER
Called pt to offer 2/28 surgery date OR 3/20 (WAIT LIST) with Dr. Anderson and got no answer. Left voicemail message with direct call back number [phone].    Kaleigh Edge on 1/9/2025 at 12:23 PM

## 2025-01-27 NOTE — TELEPHONE ENCOUNTER
FUTURE VISIT INFORMATION      SURGERY INFORMATION:  Date: 4/25/25  Location: uu or  Surgeon:  Lew Anderson MD   Anesthesia Type:  general  Procedure: laparoscopic hiatal hernia repair, fundoplication, possible gastroplasty, possible gastrostomy, endoFlip   Consult: virtual visit 12/30/24    RECORDS REQUESTED FROM:       Primary Care Provider: [facility]

## 2025-02-03 ASSESSMENT — PATIENT HEALTH QUESTIONNAIRE - PHQ9
SUM OF ALL RESPONSES TO PHQ QUESTIONS 1-9: 13
10. IF YOU CHECKED OFF ANY PROBLEMS, HOW DIFFICULT HAVE THESE PROBLEMS MADE IT FOR YOU TO DO YOUR WORK, TAKE CARE OF THINGS AT HOME, OR GET ALONG WITH OTHER PEOPLE: VERY DIFFICULT
SUM OF ALL RESPONSES TO PHQ QUESTIONS 1-9: 13

## 2025-02-04 ENCOUNTER — VIRTUAL VISIT (OUTPATIENT)
Dept: FAMILY MEDICINE | Facility: CLINIC | Age: 45
End: 2025-02-04
Payer: COMMERCIAL

## 2025-02-04 DIAGNOSIS — D50.9 IRON DEFICIENCY ANEMIA, UNSPECIFIED IRON DEFICIENCY ANEMIA TYPE: Primary | ICD-10-CM

## 2025-02-04 PROCEDURE — 98005 SYNCH AUDIO-VIDEO EST LOW 20: CPT | Performed by: NURSE PRACTITIONER

## 2025-02-04 ASSESSMENT — ENCOUNTER SYMPTOMS: FATIGUE: 1

## 2025-02-04 NOTE — PROGRESS NOTES
"Huy is a 44 year old who is being evaluated via a billable video visit.    How would you like to obtain your AVS? MyChart  If the video visit is dropped, the invitation should be resent by: Text to cell phone: [phone]  Will anyone else be joining your video visit? No      Assessment & Plan     Iron deficiency anemia, unspecified iron deficiency anemia type  History of iron infusions, feeling symptomatic again.  We will recheck levels today and order infusion if indicated given she has upcoming surgery.  Recommended she follow up with PCP in a couple months prior to her surgery to recheck levels.    - Iron and iron binding capacity; Future  - Ferritin; Future  - CBC with platelets; Future          BMI  Estimated body mass index is 30.99 kg/m  as calculated from the following:    Height as of 11/13/24: 1.676 m (5' 6\").    Weight as of 12/30/24: 87.1 kg (192 lb).         Subjective   Huy is a 44 year old, presenting for the following health issues:  Fatigue      2/4/2025    12:26 PM   Additional Questions   Roomed by Stacia WALLACE     Fatigue  Associated symptoms include fatigue.   History of Present Illness       Reason for visit:  Low iron    She eats 0-1 servings of fruits and vegetables daily.She consumes 0 sweetened beverage(s) daily.She exercises with enough effort to increase her heart rate 10 to 19 minutes per day.  She exercises with enough effort to increase her heart rate 3 or less days per week.   She is taking medications regularly.       Doing well with iron levels lately, but thinks they are off  Was wondering if it was depression, had some tough events  Donates plasma, told levels were low.    Did infusions in 2023- was life changing.  Oral replacement, upsets stomach      Surgery planned in April for Hiatal hernia.                  Objective    Vitals - Patient Reported  Weight (Patient Reported): 85.7 kg (189 lb)  Height (Patient Reported): 165.1 cm (5' 5\")  BMI (Based on Pt Reported Ht/Wt): " 31.45  Pain Score: No Pain (0)        Physical Exam   GENERAL: alert and no distress  EYES: Eyes grossly normal to inspection.  No discharge or erythema, or obvious scleral/conjunctival abnormalities.  RESP: No audible wheeze, cough, or visible cyanosis.    SKIN: Visible skin clear. No significant rash, abnormal pigmentation or lesions.  NEURO: Cranial nerves grossly intact.  Mentation and speech appropriate for age.  PSYCH: Appropriate affect, tone, and pace of words          Video-Visit Details    Type of service:  Video Visit   Originating Location (pt. Location): Home    Distant Location (provider location):  Off-site  Platform used for Video Visit: Gaurav  Signed Electronically by: Mahi Mcgill DNP

## 2025-02-05 ENCOUNTER — LAB (OUTPATIENT)
Dept: LAB | Facility: CLINIC | Age: 45
End: 2025-02-05
Payer: COMMERCIAL

## 2025-02-05 DIAGNOSIS — D50.9 IRON DEFICIENCY ANEMIA, UNSPECIFIED IRON DEFICIENCY ANEMIA TYPE: ICD-10-CM

## 2025-02-05 LAB
ERYTHROCYTE [DISTWIDTH] IN BLOOD BY AUTOMATED COUNT: 14.6 % (ref 10–15)
FERRITIN SERPL-MCNC: 22 NG/ML (ref 6–175)
HCT VFR BLD AUTO: 36.7 % (ref 35–47)
HGB BLD-MCNC: 11.6 G/DL (ref 11.7–15.7)
IRON BINDING CAPACITY (ROCHE): 313 UG/DL (ref 240–430)
IRON SATN MFR SERPL: 5 % (ref 15–46)
IRON SERPL-MCNC: 17 UG/DL (ref 37–145)
MCH RBC QN AUTO: 26.2 PG (ref 26.5–33)
MCHC RBC AUTO-ENTMCNC: 31.6 G/DL (ref 31.5–36.5)
MCV RBC AUTO: 83 FL (ref 78–100)
PLATELET # BLD AUTO: 503 10E3/UL (ref 150–450)
RBC # BLD AUTO: 4.43 10E6/UL (ref 3.8–5.2)
WBC # BLD AUTO: 9.9 10E3/UL (ref 4–11)

## 2025-02-05 PROCEDURE — 82728 ASSAY OF FERRITIN: CPT

## 2025-02-05 PROCEDURE — 83550 IRON BINDING TEST: CPT

## 2025-02-05 PROCEDURE — 83540 ASSAY OF IRON: CPT

## 2025-02-05 PROCEDURE — 36415 COLL VENOUS BLD VENIPUNCTURE: CPT

## 2025-02-05 PROCEDURE — 85027 COMPLETE CBC AUTOMATED: CPT

## 2025-02-09 DIAGNOSIS — K21.00 GASTROESOPHAGEAL REFLUX DISEASE WITH ESOPHAGITIS WITHOUT HEMORRHAGE: Primary | ICD-10-CM

## 2025-02-10 ENCOUNTER — TELEPHONE (OUTPATIENT)
Dept: FAMILY MEDICINE | Facility: CLINIC | Age: 45
End: 2025-02-10
Payer: COMMERCIAL

## 2025-02-10 ENCOUNTER — MYC MEDICAL ADVICE (OUTPATIENT)
Dept: FAMILY MEDICINE | Facility: CLINIC | Age: 45
End: 2025-02-10
Payer: COMMERCIAL

## 2025-02-10 NOTE — TELEPHONE ENCOUNTER
Test Results        Who ordered the test:  Judi Draper    Type of test: Lab    Date of test:  2/5/2025    Where was the test performed:  [facility]    What are your questions/concerns?:  patient wants her results    Could [facility] or would you prefer to receive a phone call?:   Patient would prefer a phone call   Okay to leave a detailed message?: Yes at Home number on file [phone] (home)

## 2025-02-11 NOTE — TELEPHONE ENCOUNTER
Patient reviewed lab result message from NOÉ Mcgill DNP, on 2/10/25 at 1722.    MARI AstudilloN, RN-[facility]

## 2025-02-11 NOTE — TELEPHONE ENCOUNTER
Mahi-Please review and advise.    Thank you!  MARI AstudilloN, RN-BC  [facility]

## 2025-02-11 NOTE — TELEPHONE ENCOUNTER
Her levels, especially ferritin are not low enough to warrant an infusion at this time.  Iron is mildly low, but hemoglobin is just below the normal limit.  When she had her infusions a year ago, hemoglobin was significantly low which is not the case at this time.     Apologies about the mention of gastric bypass, I had meant to say her hiatal hernia!  I can see if I can addend/correct the lab result notes.

## 2025-02-11 NOTE — TELEPHONE ENCOUNTER
Relayed POC to patient via [employer].    MARI QuirogaN, RN (she/her)  [facility] RN

## 2025-02-13 NOTE — TELEPHONE ENCOUNTER
Last Written Prescription:  esomeprazole (NEXIUM) 20 MG DR capsule -- --  -- No   Sig - Route: Take 20 mg by mouth 2 times daily. Take 30-60 minutes before eating. - Oral   Class: Historical     ----------------------  Last Visit Date: 11-13-24  Future Visit Date: 6-18-25  ----------------------      [x]  Refill decision: Medication unable to be refilled by RN due to:   Medication - Last script is Reported/Historical/Transitional        Request from pharmacy:  Requested Prescriptions   Pending Prescriptions Disp Refills    esomeprazole (NEXIUM) 20 MG DR capsule 180 capsule 3     Sig: Take 1 capsule (20 mg) by mouth 2 times daily. Take 30-60 minutes before eating.       PPI Protocol Failed - 2/13/2025  7:27 AM        Failed - Medication indicated for associated diagnosis     The medication is prescribed for one or more of the following conditions:     Erosive esophagitis    Gastritis   Gastric hypersecretion   Gastric ulcer   Gastroesophageal reflux disease   Helicobacter pylori gastrointestinal tract infection   Ulcer of duodenum   Drug-induced peptic ulcer   Esophageal stricture   Gastrointestinal hemorrhage   Indigestion   Stress ulcer   Zollinger-Wesley syndrome   Patricio s esophagus   Laryngopharyngeal reflux   Epigastric Pain   Morbid Obesity   Cough   History of Peptic Ulcer   Esophageal Atresia, Stenosis and Fistula   Cystic Fibrosis  Bronchiectasis          Passed - Medication is active on med list and the sig matches. RN to manually verify dose and sig if red X/fail.     If the protocol passes (green check), you do not need to verify med dose and sig.    A prescription matches if they are the same clinical intention.    For Example: once daily and every morning are the same.    For all fails (red x), verify dose and sig.    If the refill does match what is on file, the RN can still proceed to approve the refill request.     If they do not match, route to the appropriate provider.             Passed -  Recent (12 mo) or future (90 days) visit within the authorizing provider's specialty     The patient must have completed an in-person or virtual visit within the past 12 months or has a future visit scheduled within the next 90 days with the authorizing provider s specialty.  Urgent care and e-visits do not qualify as an office visit for this protocol.          Passed - Patient is age 18 or older        Passed - No active pregnacy on record        Passed - No positive pregnancy test in past 12 months

## 2025-02-17 ENCOUNTER — VIRTUAL VISIT (OUTPATIENT)
Dept: FAMILY MEDICINE | Facility: CLINIC | Age: 45
End: 2025-02-17
Payer: COMMERCIAL

## 2025-02-17 DIAGNOSIS — K20.90 ESOPHAGITIS DETERMINED BY ENDOSCOPY: ICD-10-CM

## 2025-02-17 DIAGNOSIS — D50.9 IRON DEFICIENCY ANEMIA, UNSPECIFIED IRON DEFICIENCY ANEMIA TYPE: ICD-10-CM

## 2025-02-17 DIAGNOSIS — G25.81 RESTLESS LEG SYNDROME: ICD-10-CM

## 2025-02-17 DIAGNOSIS — K21.00 HIATAL HERNIA WITH GASTROESOPHAGEAL REFLUX DISEASE AND ESOPHAGITIS: ICD-10-CM

## 2025-02-17 DIAGNOSIS — F33.1 MODERATE EPISODE OF RECURRENT MAJOR DEPRESSIVE DISORDER (H): Primary | ICD-10-CM

## 2025-02-17 DIAGNOSIS — K44.9 HIATAL HERNIA WITH GASTROESOPHAGEAL REFLUX DISEASE AND ESOPHAGITIS: ICD-10-CM

## 2025-02-17 PROCEDURE — 98006 SYNCH AUDIO-VIDEO EST MOD 30: CPT | Performed by: PHYSICIAN ASSISTANT

## 2025-02-17 PROCEDURE — 96127 BRIEF EMOTIONAL/BEHAV ASSMT: CPT | Mod: 95 | Performed by: PHYSICIAN ASSISTANT

## 2025-02-17 RX ORDER — DIPHENHYDRAMINE HYDROCHLORIDE 50 MG/ML
50 INJECTION INTRAMUSCULAR; INTRAVENOUS
Start: 2025-02-24

## 2025-02-17 RX ORDER — ALBUTEROL SULFATE 0.83 MG/ML
2.5 SOLUTION RESPIRATORY (INHALATION)
OUTPATIENT
Start: 2025-02-24

## 2025-02-17 RX ORDER — MEPERIDINE HYDROCHLORIDE 25 MG/ML
25 INJECTION INTRAMUSCULAR; INTRAVENOUS; SUBCUTANEOUS
OUTPATIENT
Start: 2025-02-24

## 2025-02-17 RX ORDER — ALBUTEROL SULFATE 90 UG/1
1-2 INHALANT RESPIRATORY (INHALATION)
Start: 2025-02-24

## 2025-02-17 RX ORDER — HEPARIN SODIUM,PORCINE 10 UNIT/ML
5-20 VIAL (ML) INTRAVENOUS DAILY PRN
OUTPATIENT
Start: 2025-02-24

## 2025-02-17 RX ORDER — EPINEPHRINE 1 MG/ML
0.3 INJECTION, SOLUTION, CONCENTRATE INTRAVENOUS EVERY 5 MIN PRN
OUTPATIENT
Start: 2025-02-24

## 2025-02-17 RX ORDER — METHYLPREDNISOLONE SODIUM SUCCINATE 40 MG/ML
40 INJECTION INTRAMUSCULAR; INTRAVENOUS
Start: 2025-02-24

## 2025-02-17 RX ORDER — HEPARIN SODIUM (PORCINE) LOCK FLUSH IV SOLN 100 UNIT/ML 100 UNIT/ML
5 SOLUTION INTRAVENOUS
OUTPATIENT
Start: 2025-02-24

## 2025-02-17 RX ORDER — DIPHENHYDRAMINE HYDROCHLORIDE 50 MG/ML
25 INJECTION INTRAMUSCULAR; INTRAVENOUS
Start: 2025-02-24

## 2025-02-17 ASSESSMENT — ANXIETY QUESTIONNAIRES
5. BEING SO RESTLESS THAT IT IS HARD TO SIT STILL: NOT AT ALL
GAD7 TOTAL SCORE: 7
7. FEELING AFRAID AS IF SOMETHING AWFUL MIGHT HAPPEN: SEVERAL DAYS
3. WORRYING TOO MUCH ABOUT DIFFERENT THINGS: MORE THAN HALF THE DAYS
7. FEELING AFRAID AS IF SOMETHING AWFUL MIGHT HAPPEN: SEVERAL DAYS
4. TROUBLE RELAXING: MORE THAN HALF THE DAYS
IF YOU CHECKED OFF ANY PROBLEMS ON THIS QUESTIONNAIRE, HOW DIFFICULT HAVE THESE PROBLEMS MADE IT FOR YOU TO DO YOUR WORK, TAKE CARE OF THINGS AT HOME, OR GET ALONG WITH OTHER PEOPLE: VERY DIFFICULT
8. IF YOU CHECKED OFF ANY PROBLEMS, HOW DIFFICULT HAVE THESE MADE IT FOR YOU TO DO YOUR WORK, TAKE CARE OF THINGS AT HOME, OR GET ALONG WITH OTHER PEOPLE?: VERY DIFFICULT
GAD7 TOTAL SCORE: 7
GAD7 TOTAL SCORE: 7
1. FEELING NERVOUS, ANXIOUS, OR ON EDGE: SEVERAL DAYS
2. NOT BEING ABLE TO STOP OR CONTROL WORRYING: NOT AT ALL
6. BECOMING EASILY ANNOYED OR IRRITABLE: SEVERAL DAYS

## 2025-02-17 ASSESSMENT — PATIENT HEALTH QUESTIONNAIRE - PHQ9
SUM OF ALL RESPONSES TO PHQ QUESTIONS 1-9: 15
10. IF YOU CHECKED OFF ANY PROBLEMS, HOW DIFFICULT HAVE THESE PROBLEMS MADE IT FOR YOU TO DO YOUR WORK, TAKE CARE OF THINGS AT HOME, OR GET ALONG WITH OTHER PEOPLE: VERY DIFFICULT
SUM OF ALL RESPONSES TO PHQ QUESTIONS 1-9: 15

## 2025-02-17 NOTE — LETTER
February 17, 2025      Huy Glover  [address] [address]        To Whom It May Concern:      Huy Glover was seen in our clinic. Due to chronic medical condition, we request that she be allowed to flex her work hours through 3/31/2025.      Sincerely,    Judi Draper PA-C       Electronically signed

## 2025-02-17 NOTE — PROGRESS NOTES
"Huy is a 44 year old who is being evaluated via a billable video visit.    How would you like to obtain your AVS? [employer]  If the video visit is dropped, the invitation should be resent by: Send to e-mail at: [email or web address]  Will anyone else be joining your video visit? No      Assessment & Plan     Moderate episode of recurrent major depressive disorder (H) - not tolerating increased dose of sertraline due to restless leg - question whether this is side effect from sertraline vs symptom from iron deficiency. Will treat iron deficiency and follow up to consider ongoing dose increase of sertraline vs additional agent. She is working on finding therapist. Work note sent via [employer].    Iron deficiency anemia, unspecified iron deficiency anemia type  Hiatal hernia with gastroesophageal reflux disease and esophagitis  Esophagitis determined by endoscopy   Restless leg syndrome - orders entered iron transfusions, will follow up on side effects in 3 weeks.      BMI  Estimated body mass index is 30.99 kg/m  as calculated from the following:    Height as of 11/13/24: 1.676 m (5' 6\").    Weight as of 12/30/24: 87.1 kg (192 lb).       The longitudinal plan of care for the diagnosis(es)/condition(s) as documented were addressed during this visit. Due to the added complexity in care, I will continue to support Huy in the subsequent management and with ongoing continuity of care.      Subjective   Huy is a 44 year old, presenting for the following health issues:  Letter for School/Work and MH Follow Up        2/17/2025     9:21 AM   Additional Questions   Roomed by Toshia Son called today for mental health follow up     has very challenging mental health condition (he is also my pt)  He has been gone since November - was inpatient and recommended transfer to outpt but he did not  He undid a lot of the securities that they had in place to keep him safe  [place]  This is causing a lot of stress " for her, guilt  She has a support group who she has been connected with in the past  January was very challenging - focus is really hard  Not up to full dose of sertraline - taking 1/2 tab (25mg) - noticing restless leg as side effect  Had been sleeping for 24 hours/day - would miss work meetings  Then started getting dizzy - history of anemia and found to have recurrent anemia   Would like iron transfusions (has worked well in the past)  Also wondering about work note to flex her hours    History of Present Illness       Mental Health Follow-up:  Patient presents to follow-up on Depression & Anxiety.Patient's depression since last visit has been:  Worse  The patient is having other symptoms associated with depression.  Patient's anxiety since last visit has been:  Worse  The patient is having other symptoms associated with anxiety.  Any significant life events: relationship concerns, job concerns and financial concerns  Patient is feeling anxious or having panic attacks.  Patient has no concerns about alcohol or drug use.    Reason for visit:  Low iron follow up and talk about depression/work    She eats 0-1 servings of fruits and vegetables daily.She consumes 1 sweetened beverage(s) daily.She exercises with enough effort to increase her heart rate 20 to 29 minutes per day.  She exercises with enough effort to increase her heart rate 3 or less days per week.   She is taking medications regularly.         Objective       Vitals:  No vitals were obtained today due to virtual visit.    Physical Exam   GENERAL: alert and no distress  EYES: Eyes grossly normal to inspection.  No discharge or erythema, or obvious scleral/conjunctival abnormalities.  RESP: No audible wheeze, cough, or visible cyanosis.    SKIN: Visible skin clear. No significant rash, abnormal pigmentation or lesions.  NEURO: Cranial nerves grossly intact.  Mentation and speech appropriate for age.  PSYCH: Appropriate affect, tone, and pace of words       Video-Visit Details    Type of service:  Video Visit   Originating Location (pt. Location): Home    Distant Location (provider location):  Off-site  Platform used for Video Visit: Gaurav  Signed Electronically by: Judi Draper PA-C

## 2025-02-19 ENCOUNTER — MYC MEDICAL ADVICE (OUTPATIENT)
Dept: FAMILY MEDICINE | Facility: CLINIC | Age: 45
End: 2025-02-19
Payer: COMMERCIAL

## 2025-02-19 NOTE — LETTER
February 24, 2025      Huy Glover  [address] [address]        To Whom It May Concern:    Huy Glover was seen in our clinic. Due to chronic medical condition, we request that she be allowed to take breaks as needed, and flex her work hours throughout the day while still maintaining 40 hours per week through 3/31/2025.       Sincerely,           Electronically signed

## 2025-02-24 NOTE — TELEPHONE ENCOUNTER
Judi--- please review pt's [employer] message regarding about a letter for work.    Writer responded to pt via [employer].    Melvin Sweet, BSN, PHN, RN-[facility]

## 2025-03-16 ENCOUNTER — HEALTH MAINTENANCE LETTER (OUTPATIENT)
Age: 45
End: 2025-03-16

## 2025-03-17 ENCOUNTER — INFUSION THERAPY VISIT (OUTPATIENT)
Dept: INFUSION THERAPY | Facility: CLINIC | Age: 45
End: 2025-03-17
Attending: PHYSICIAN ASSISTANT
Payer: COMMERCIAL

## 2025-03-17 VITALS
OXYGEN SATURATION: 98 % | DIASTOLIC BLOOD PRESSURE: 83 MMHG | TEMPERATURE: 98.4 F | HEART RATE: 83 BPM | SYSTOLIC BLOOD PRESSURE: 128 MMHG

## 2025-03-17 DIAGNOSIS — K21.00 HIATAL HERNIA WITH GASTROESOPHAGEAL REFLUX DISEASE AND ESOPHAGITIS: ICD-10-CM

## 2025-03-17 DIAGNOSIS — D50.9 IRON DEFICIENCY ANEMIA, UNSPECIFIED IRON DEFICIENCY ANEMIA TYPE: Primary | ICD-10-CM

## 2025-03-17 DIAGNOSIS — K20.90 ESOPHAGITIS DETERMINED BY ENDOSCOPY: ICD-10-CM

## 2025-03-17 DIAGNOSIS — K44.9 HIATAL HERNIA WITH GASTROESOPHAGEAL REFLUX DISEASE AND ESOPHAGITIS: ICD-10-CM

## 2025-03-17 PROCEDURE — 96366 THER/PROPH/DIAG IV INF ADDON: CPT

## 2025-03-17 PROCEDURE — 96365 THER/PROPH/DIAG IV INF INIT: CPT

## 2025-03-17 PROCEDURE — 250N000011 HC RX IP 250 OP 636: Performed by: PHYSICIAN ASSISTANT

## 2025-03-17 PROCEDURE — 258N000003 HC RX IP 258 OP 636: Performed by: PHYSICIAN ASSISTANT

## 2025-03-17 RX ORDER — DIPHENHYDRAMINE HYDROCHLORIDE 50 MG/ML
50 INJECTION, SOLUTION INTRAMUSCULAR; INTRAVENOUS
Start: 2025-03-19

## 2025-03-17 RX ORDER — MEPERIDINE HYDROCHLORIDE 25 MG/ML
25 INJECTION INTRAMUSCULAR; INTRAVENOUS; SUBCUTANEOUS
OUTPATIENT
Start: 2025-03-19

## 2025-03-17 RX ORDER — HEPARIN SODIUM (PORCINE) LOCK FLUSH IV SOLN 100 UNIT/ML 100 UNIT/ML
5 SOLUTION INTRAVENOUS
OUTPATIENT
Start: 2025-03-19

## 2025-03-17 RX ORDER — EPINEPHRINE 1 MG/ML
0.3 INJECTION, SOLUTION, CONCENTRATE INTRAVENOUS EVERY 5 MIN PRN
OUTPATIENT
Start: 2025-03-19

## 2025-03-17 RX ORDER — ALBUTEROL SULFATE 0.83 MG/ML
2.5 SOLUTION RESPIRATORY (INHALATION)
OUTPATIENT
Start: 2025-03-19

## 2025-03-17 RX ORDER — METHYLPREDNISOLONE SODIUM SUCCINATE 40 MG/ML
40 INJECTION INTRAMUSCULAR; INTRAVENOUS
Start: 2025-03-19

## 2025-03-17 RX ORDER — HEPARIN SODIUM,PORCINE 10 UNIT/ML
5-20 VIAL (ML) INTRAVENOUS DAILY PRN
OUTPATIENT
Start: 2025-03-19

## 2025-03-17 RX ORDER — ALBUTEROL SULFATE 90 UG/1
1-2 INHALANT RESPIRATORY (INHALATION)
Start: 2025-03-19

## 2025-03-17 RX ORDER — DIPHENHYDRAMINE HYDROCHLORIDE 50 MG/ML
25 INJECTION, SOLUTION INTRAMUSCULAR; INTRAVENOUS
Start: 2025-03-19

## 2025-03-17 RX ADMIN — IRON SUCROSE 300 MG: 20 INJECTION, SOLUTION INTRAVENOUS at 08:20

## 2025-03-17 NOTE — PROGRESS NOTES
Infusion Nursing Note:  Huy Glover presents today for venofer.    Patient seen by provider today: No   present during visit today: Not Applicable.    Note: Pt here for venofer dose 1/3. Has received in the past without difficulty.       Intravenous Access:  Peripheral IV placed.    Treatment Conditions:  Not Applicable.      Post Infusion Assessment:  Patient tolerated infusion without incident.  Site patent and intact, free from redness, edema or discomfort.  No evidence of extravasations.  Access discontinued per protocol.       Discharge Plan:   Discharge instructions reviewed with: Patient.  Patient and/or family verbalized understanding of discharge instructions and all questions answered.  Patient discharged in stable condition accompanied by: self.  Departure Mode: Ambulatory.      Elisha Marcelino RN

## 2025-03-19 NOTE — TELEPHONE ENCOUNTER
FUTURE VISIT INFORMATION      SURGERY INFORMATION:  Date: 5/5/25  Location: [facility] OR  Surgeon:   Lew Anderson MD   Anesthesia Type:  General   Procedure: LAPAROSCOPIC HIATAL HERNIA REPAIR, FUNDOPLICATION, POSSIBLE GASTROPLASTY, POSSIBLE GASTROSTOMY, ENDOFLIP  Consult: 12/30/24    RECORDS REQUESTED FROM:       Primary Care Provider: Judi Draper PA-C - [facility]     Pertinent Medical History: Iron deficiency Anemia     Most recent EKG+ Tracing: 10/31/19 - [facility]     Update March 19, 2025 2:51 PM MMT :  Faxed request to [facility] for EKG tracings

## 2025-03-24 ENCOUNTER — INFUSION THERAPY VISIT (OUTPATIENT)
Dept: INFUSION THERAPY | Facility: CLINIC | Age: 45
End: 2025-03-24
Attending: PHYSICIAN ASSISTANT
Payer: COMMERCIAL

## 2025-03-24 VITALS
SYSTOLIC BLOOD PRESSURE: 139 MMHG | DIASTOLIC BLOOD PRESSURE: 78 MMHG | HEART RATE: 95 BPM | OXYGEN SATURATION: 96 % | TEMPERATURE: 98.6 F | RESPIRATION RATE: 18 BRPM

## 2025-03-24 DIAGNOSIS — K44.9 HIATAL HERNIA WITH GASTROESOPHAGEAL REFLUX DISEASE AND ESOPHAGITIS: ICD-10-CM

## 2025-03-24 DIAGNOSIS — D50.9 IRON DEFICIENCY ANEMIA, UNSPECIFIED IRON DEFICIENCY ANEMIA TYPE: Primary | ICD-10-CM

## 2025-03-24 DIAGNOSIS — K21.00 HIATAL HERNIA WITH GASTROESOPHAGEAL REFLUX DISEASE AND ESOPHAGITIS: ICD-10-CM

## 2025-03-24 DIAGNOSIS — K20.90 ESOPHAGITIS DETERMINED BY ENDOSCOPY: ICD-10-CM

## 2025-03-24 PROCEDURE — 258N000003 HC RX IP 258 OP 636: Performed by: PHYSICIAN ASSISTANT

## 2025-03-24 PROCEDURE — 96365 THER/PROPH/DIAG IV INF INIT: CPT

## 2025-03-24 PROCEDURE — 250N000011 HC RX IP 250 OP 636: Performed by: PHYSICIAN ASSISTANT

## 2025-03-24 RX ORDER — EPINEPHRINE 1 MG/ML
0.3 INJECTION, SOLUTION, CONCENTRATE INTRAVENOUS EVERY 5 MIN PRN
Status: CANCELLED | OUTPATIENT
Start: 2025-03-25

## 2025-03-24 RX ORDER — ALBUTEROL SULFATE 0.83 MG/ML
2.5 SOLUTION RESPIRATORY (INHALATION)
Status: CANCELLED | OUTPATIENT
Start: 2025-03-25

## 2025-03-24 RX ORDER — DIPHENHYDRAMINE HYDROCHLORIDE 50 MG/ML
50 INJECTION, SOLUTION INTRAMUSCULAR; INTRAVENOUS
Status: CANCELLED
Start: 2025-03-25

## 2025-03-24 RX ORDER — METHYLPREDNISOLONE SODIUM SUCCINATE 40 MG/ML
40 INJECTION INTRAMUSCULAR; INTRAVENOUS
Status: CANCELLED
Start: 2025-03-25

## 2025-03-24 RX ORDER — ALBUTEROL SULFATE 90 UG/1
1-2 INHALANT RESPIRATORY (INHALATION)
Status: CANCELLED
Start: 2025-03-25

## 2025-03-24 RX ORDER — DIPHENHYDRAMINE HYDROCHLORIDE 50 MG/ML
25 INJECTION, SOLUTION INTRAMUSCULAR; INTRAVENOUS
Status: CANCELLED
Start: 2025-03-25

## 2025-03-24 RX ORDER — HEPARIN SODIUM (PORCINE) LOCK FLUSH IV SOLN 100 UNIT/ML 100 UNIT/ML
5 SOLUTION INTRAVENOUS
Status: CANCELLED | OUTPATIENT
Start: 2025-03-25

## 2025-03-24 RX ORDER — HEPARIN SODIUM,PORCINE 10 UNIT/ML
5-20 VIAL (ML) INTRAVENOUS DAILY PRN
Status: CANCELLED | OUTPATIENT
Start: 2025-03-25

## 2025-03-24 RX ORDER — MEPERIDINE HYDROCHLORIDE 25 MG/ML
25 INJECTION INTRAMUSCULAR; INTRAVENOUS; SUBCUTANEOUS
Status: CANCELLED | OUTPATIENT
Start: 2025-03-25

## 2025-03-24 RX ADMIN — IRON SUCROSE 300 MG: 20 INJECTION, SOLUTION INTRAVENOUS at 08:25

## 2025-03-24 ASSESSMENT — PAIN SCALES - GENERAL: PAINLEVEL_OUTOF10: NO PAIN (0)

## 2025-03-24 NOTE — PROGRESS NOTES
Infusion Nursing Note:  Huy Glover presents today for final dose of venofer 300mg.    Patient seen by provider today: No   present during visit today: Not Applicable.    Note: Patient states she hasn't really felt much different, states it usually take about a month for her to start feeling better.      Intravenous Access:  Peripheral IV placed.    Treatment Conditions:  Not Applicable.      Post Infusion Assessment:  Patient tolerated infusion without incident.  Blood return noted pre and post infusion.  Site patent and intact, free from redness, edema or discomfort.  No evidence of extravasations.  Access discontinued per protocol.       Discharge Plan:   Patient discharged in stable condition accompanied by: self.  Departure Mode: Ambulatory.  Therapy complete    Analia Cohen RN

## 2025-04-07 ENCOUNTER — PRE VISIT (OUTPATIENT)
Dept: SURGERY | Facility: CLINIC | Age: 45
End: 2025-04-07

## 2025-04-21 ENCOUNTER — TELEPHONE (OUTPATIENT)
Dept: GASTROENTEROLOGY | Facility: CLINIC | Age: 45
End: 2025-04-21
Payer: COMMERCIAL

## 2025-04-21 NOTE — TELEPHONE ENCOUNTER
Left Voicemail (1st Attempt) for the patient to call back and schedule the following:    Appointment type: return   Provider: Katalina Shahid   Return date:6/18/2025  Specialty phone number: [phone]  Additional appointment(s) needed:   Additonal Notes:

## 2025-05-05 ENCOUNTER — PRE VISIT (OUTPATIENT)
Dept: SURGERY | Facility: CLINIC | Age: 45
End: 2025-05-05

## 2025-05-07 ENCOUNTER — TELEPHONE (OUTPATIENT)
Dept: ONCOLOGY | Facility: CLINIC | Age: 45
End: 2025-05-07
Payer: COMMERCIAL

## 2025-05-07 NOTE — CONFIDENTIAL NOTE
Left message for Huy wondering if she is still planning on getting care with Dr. Anderson since she cancelled her PAC and CT scan appointments.  Did explain she would need both rescheduled prior to any surgery she would have with Dr. Anderson.    Thanks    Shante Vega RN Care Coordinator  [facility]  Ph.[phone] Fax. [phone]

## 2025-06-08 ENCOUNTER — HEALTH MAINTENANCE LETTER (OUTPATIENT)
Age: 45
End: 2025-06-08

## 2025-07-10 ENCOUNTER — VIRTUAL VISIT (OUTPATIENT)
Dept: FAMILY MEDICINE | Facility: CLINIC | Age: 45
End: 2025-07-10
Payer: COMMERCIAL

## 2025-07-10 DIAGNOSIS — Z12.31 VISIT FOR SCREENING MAMMOGRAM: ICD-10-CM

## 2025-07-10 DIAGNOSIS — F41.1 GENERALIZED ANXIETY DISORDER: ICD-10-CM

## 2025-07-10 DIAGNOSIS — D50.9 IRON DEFICIENCY ANEMIA, UNSPECIFIED IRON DEFICIENCY ANEMIA TYPE: ICD-10-CM

## 2025-07-10 DIAGNOSIS — G89.29 CHRONIC BILATERAL LOW BACK PAIN WITHOUT SCIATICA: ICD-10-CM

## 2025-07-10 DIAGNOSIS — H93.11 TINNITUS, RIGHT: ICD-10-CM

## 2025-07-10 DIAGNOSIS — F33.0 MILD EPISODE OF RECURRENT MAJOR DEPRESSIVE DISORDER: ICD-10-CM

## 2025-07-10 DIAGNOSIS — M54.50 CHRONIC BILATERAL LOW BACK PAIN WITHOUT SCIATICA: ICD-10-CM

## 2025-07-10 DIAGNOSIS — R42 DIZZINESS: Primary | ICD-10-CM

## 2025-07-10 DIAGNOSIS — Z87.828 HISTORY OF TRAUMA TO SPINE: ICD-10-CM

## 2025-07-10 PROBLEM — E66.09 CLASS 1 OBESITY DUE TO EXCESS CALORIES WITHOUT SERIOUS COMORBIDITY WITH BODY MASS INDEX (BMI) OF 30.0 TO 30.9 IN ADULT: Status: ACTIVE | Noted: 2024-07-11

## 2025-07-10 PROBLEM — E66.811 CLASS 1 OBESITY DUE TO EXCESS CALORIES WITHOUT SERIOUS COMORBIDITY WITH BODY MASS INDEX (BMI) OF 30.0 TO 30.9 IN ADULT: Status: ACTIVE | Noted: 2024-07-11

## 2025-07-10 ASSESSMENT — PATIENT HEALTH QUESTIONNAIRE - PHQ9
SUM OF ALL RESPONSES TO PHQ QUESTIONS 1-9: 14
SUM OF ALL RESPONSES TO PHQ QUESTIONS 1-9: 14
10. IF YOU CHECKED OFF ANY PROBLEMS, HOW DIFFICULT HAVE THESE PROBLEMS MADE IT FOR YOU TO DO YOUR WORK, TAKE CARE OF THINGS AT HOME, OR GET ALONG WITH OTHER PEOPLE: VERY DIFFICULT

## 2025-07-10 ASSESSMENT — ANXIETY QUESTIONNAIRES
7. FEELING AFRAID AS IF SOMETHING AWFUL MIGHT HAPPEN: SEVERAL DAYS
5. BEING SO RESTLESS THAT IT IS HARD TO SIT STILL: NOT AT ALL
7. FEELING AFRAID AS IF SOMETHING AWFUL MIGHT HAPPEN: SEVERAL DAYS
GAD7 TOTAL SCORE: 10
2. NOT BEING ABLE TO STOP OR CONTROL WORRYING: MORE THAN HALF THE DAYS
4. TROUBLE RELAXING: NOT AT ALL
3. WORRYING TOO MUCH ABOUT DIFFERENT THINGS: MORE THAN HALF THE DAYS
8. IF YOU CHECKED OFF ANY PROBLEMS, HOW DIFFICULT HAVE THESE MADE IT FOR YOU TO DO YOUR WORK, TAKE CARE OF THINGS AT HOME, OR GET ALONG WITH OTHER PEOPLE?: VERY DIFFICULT
IF YOU CHECKED OFF ANY PROBLEMS ON THIS QUESTIONNAIRE, HOW DIFFICULT HAVE THESE PROBLEMS MADE IT FOR YOU TO DO YOUR WORK, TAKE CARE OF THINGS AT HOME, OR GET ALONG WITH OTHER PEOPLE: VERY DIFFICULT
1. FEELING NERVOUS, ANXIOUS, OR ON EDGE: MORE THAN HALF THE DAYS
2. NOT BEING ABLE TO STOP OR CONTROL WORRYING: MORE THAN HALF THE DAYS
GAD7 TOTAL SCORE: 10
GAD7 TOTAL SCORE: 10
3. WORRYING TOO MUCH ABOUT DIFFERENT THINGS: MORE THAN HALF THE DAYS
5. BEING SO RESTLESS THAT IT IS HARD TO SIT STILL: NOT AT ALL
7. FEELING AFRAID AS IF SOMETHING AWFUL MIGHT HAPPEN: SEVERAL DAYS
IF YOU CHECKED OFF ANY PROBLEMS ON THIS QUESTIONNAIRE, HOW DIFFICULT HAVE THESE PROBLEMS MADE IT FOR YOU TO DO YOUR WORK, TAKE CARE OF THINGS AT HOME, OR GET ALONG WITH OTHER PEOPLE: VERY DIFFICULT
6. BECOMING EASILY ANNOYED OR IRRITABLE: NEARLY EVERY DAY
GAD7 TOTAL SCORE: 10
6. BECOMING EASILY ANNOYED OR IRRITABLE: NEARLY EVERY DAY
1. FEELING NERVOUS, ANXIOUS, OR ON EDGE: MORE THAN HALF THE DAYS
4. TROUBLE RELAXING: NOT AT ALL
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
## 2025-07-10 NOTE — LETTER
July 10, 2025      Huy Glover  [address] [address]        To Whom It May Concern:    Huy Glover was seen in our clinic. Due to chronic medical condition, we request that she be allowed to take breaks as needed, and flex her work hours throughout the day while still maintaining 40 hours per week through 7/1/2026.       Sincerely,    Judi Draper PA-C       Electronically signed

## 2025-07-14 ENCOUNTER — PATIENT OUTREACH (OUTPATIENT)
Dept: CARE COORDINATION | Facility: CLINIC | Age: 45
End: 2025-07-14
Payer: COMMERCIAL
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
## 2025-08-04 ENCOUNTER — PRE VISIT (OUTPATIENT)
Dept: NEUROSURGERY | Facility: CLINIC | Age: 45
End: 2025-08-04

## 2025-08-04 ENCOUNTER — OFFICE VISIT (OUTPATIENT)
Dept: NEUROSURGERY | Facility: CLINIC | Age: 45
End: 2025-08-04
Attending: PHYSICIAN ASSISTANT
Payer: COMMERCIAL

## 2025-08-04 VITALS
HEART RATE: 71 BPM | RESPIRATION RATE: 16 BRPM | HEIGHT: 66 IN | DIASTOLIC BLOOD PRESSURE: 75 MMHG | BODY MASS INDEX: 30.22 KG/M2 | WEIGHT: 188 LBS | OXYGEN SATURATION: 98 % | SYSTOLIC BLOOD PRESSURE: 111 MMHG

## 2025-08-04 DIAGNOSIS — Z87.828 HISTORY OF TRAUMA TO SPINE: ICD-10-CM

## 2025-08-04 DIAGNOSIS — M54.50 CHRONIC BILATERAL LOW BACK PAIN WITHOUT SCIATICA: ICD-10-CM

## 2025-08-04 DIAGNOSIS — G89.29 CHRONIC BILATERAL LOW BACK PAIN WITHOUT SCIATICA: ICD-10-CM

## 2025-08-04 PROCEDURE — 1125F AMNT PAIN NOTED PAIN PRSNT: CPT | Performed by: PHYSICIAN ASSISTANT

## 2025-08-04 PROCEDURE — 3078F DIAST BP <80 MM HG: CPT | Performed by: PHYSICIAN ASSISTANT

## 2025-08-04 PROCEDURE — 99203 OFFICE O/P NEW LOW 30 MIN: CPT | Performed by: PHYSICIAN ASSISTANT

## 2025-08-04 PROCEDURE — 3074F SYST BP LT 130 MM HG: CPT | Performed by: PHYSICIAN ASSISTANT

## 2025-08-04 ASSESSMENT — PAIN SCALES - GENERAL: PAINLEVEL_OUTOF10: MILD PAIN (2)
